# Patient Record
Sex: FEMALE | Race: WHITE | NOT HISPANIC OR LATINO | Employment: OTHER | URBAN - METROPOLITAN AREA
[De-identification: names, ages, dates, MRNs, and addresses within clinical notes are randomized per-mention and may not be internally consistent; named-entity substitution may affect disease eponyms.]

---

## 2017-05-31 ENCOUNTER — NURSE TRIAGE (OUTPATIENT)
Dept: ADMINISTRATIVE | Facility: CLINIC | Age: 72
End: 2017-05-31

## 2017-05-31 NOTE — TELEPHONE ENCOUNTER
Reason for Disposition   Patient wants to be seen    Protocols used: ST DIZZINESS-A-OH  Patient states she has been lightheaded and nauseous for the past few days.

## 2017-06-01 ENCOUNTER — OFFICE VISIT (OUTPATIENT)
Dept: INTERNAL MEDICINE | Facility: CLINIC | Age: 72
End: 2017-06-01
Payer: MEDICARE

## 2017-06-01 ENCOUNTER — LAB VISIT (OUTPATIENT)
Dept: LAB | Facility: HOSPITAL | Age: 72
End: 2017-06-01
Attending: INTERNAL MEDICINE
Payer: MEDICARE

## 2017-06-01 VITALS
DIASTOLIC BLOOD PRESSURE: 78 MMHG | BODY MASS INDEX: 32.91 KG/M2 | SYSTOLIC BLOOD PRESSURE: 158 MMHG | HEART RATE: 70 BPM | HEIGHT: 62 IN | WEIGHT: 178.81 LBS

## 2017-06-01 DIAGNOSIS — J30.9 ALLERGIC RHINITIS, UNSPECIFIED ALLERGIC RHINITIS TRIGGER, UNSPECIFIED RHINITIS SEASONALITY: ICD-10-CM

## 2017-06-01 DIAGNOSIS — Z12.31 OTHER SCREENING MAMMOGRAM: ICD-10-CM

## 2017-06-01 DIAGNOSIS — R42 VERTIGO: ICD-10-CM

## 2017-06-01 DIAGNOSIS — I10 ESSENTIAL HYPERTENSION: Primary | ICD-10-CM

## 2017-06-01 DIAGNOSIS — I10 ESSENTIAL HYPERTENSION: ICD-10-CM

## 2017-06-01 LAB
ALBUMIN SERPL BCP-MCNC: 3.9 G/DL
ALP SERPL-CCNC: 79 U/L
ALT SERPL W/O P-5'-P-CCNC: 17 U/L
ANION GAP SERPL CALC-SCNC: 9 MMOL/L
AST SERPL-CCNC: 15 U/L
BASOPHILS # BLD AUTO: 0.07 K/UL
BASOPHILS NFR BLD: 1.1 %
BILIRUB DIRECT SERPL-MCNC: 0.1 MG/DL
BILIRUB SERPL-MCNC: 0.3 MG/DL
BUN SERPL-MCNC: 16 MG/DL
CALCIUM SERPL-MCNC: 9.7 MG/DL
CHLORIDE SERPL-SCNC: 102 MMOL/L
CHOLEST/HDLC SERPL: 4.2 {RATIO}
CO2 SERPL-SCNC: 27 MMOL/L
CREAT SERPL-MCNC: 0.8 MG/DL
DIFFERENTIAL METHOD: ABNORMAL
EOSINOPHIL # BLD AUTO: 0.1 K/UL
EOSINOPHIL NFR BLD: 1.4 %
ERYTHROCYTE [DISTWIDTH] IN BLOOD BY AUTOMATED COUNT: 12.3 %
EST. GFR  (AFRICAN AMERICAN): >60 ML/MIN/1.73 M^2
EST. GFR  (NON AFRICAN AMERICAN): >60 ML/MIN/1.73 M^2
GLUCOSE SERPL-MCNC: 105 MG/DL
HCT VFR BLD AUTO: 42.6 %
HDL/CHOLESTEROL RATIO: 24.1 %
HDLC SERPL-MCNC: 191 MG/DL
HDLC SERPL-MCNC: 46 MG/DL
HGB BLD-MCNC: 14.4 G/DL
LDLC SERPL CALC-MCNC: 117 MG/DL
LYMPHOCYTES # BLD AUTO: 2.1 K/UL
LYMPHOCYTES NFR BLD: 32 %
MCH RBC QN AUTO: 31.3 PG
MCHC RBC AUTO-ENTMCNC: 33.8 %
MCV RBC AUTO: 93 FL
MONOCYTES # BLD AUTO: 0.5 K/UL
MONOCYTES NFR BLD: 7.4 %
NEUTROPHILS # BLD AUTO: 3.8 K/UL
NEUTROPHILS NFR BLD: 57.9 %
NONHDLC SERPL-MCNC: 145 MG/DL
PLATELET # BLD AUTO: 257 K/UL
PMV BLD AUTO: 10.4 FL
POTASSIUM SERPL-SCNC: 4.5 MMOL/L
PROT SERPL-MCNC: 7.3 G/DL
RBC # BLD AUTO: 4.6 M/UL
SODIUM SERPL-SCNC: 138 MMOL/L
TRIGL SERPL-MCNC: 140 MG/DL
TSH SERPL DL<=0.005 MIU/L-ACNC: 1.95 UIU/ML
WBC # BLD AUTO: 6.51 K/UL

## 2017-06-01 PROCEDURE — 36415 COLL VENOUS BLD VENIPUNCTURE: CPT

## 2017-06-01 PROCEDURE — 85025 COMPLETE CBC W/AUTO DIFF WBC: CPT

## 2017-06-01 PROCEDURE — 80048 BASIC METABOLIC PNL TOTAL CA: CPT

## 2017-06-01 PROCEDURE — 80076 HEPATIC FUNCTION PANEL: CPT

## 2017-06-01 PROCEDURE — 99214 OFFICE O/P EST MOD 30 MIN: CPT | Mod: S$PBB,,, | Performed by: INTERNAL MEDICINE

## 2017-06-01 PROCEDURE — 80061 LIPID PANEL: CPT

## 2017-06-01 PROCEDURE — 84443 ASSAY THYROID STIM HORMONE: CPT

## 2017-06-01 PROCEDURE — 99999 PR PBB SHADOW E&M-EST. PATIENT-LVL III: CPT | Mod: PBBFAC,,, | Performed by: INTERNAL MEDICINE

## 2017-06-01 RX ORDER — AMLODIPINE BESYLATE 5 MG/1
5 TABLET ORAL DAILY
Qty: 30 TABLET | Refills: 6 | Status: SHIPPED | OUTPATIENT
Start: 2017-06-01 | End: 2017-11-27 | Stop reason: SDUPTHER

## 2017-06-01 NOTE — PROGRESS NOTES
"Subjective:       Patient ID: Delphine Perdomo is a 71 y.o. female.    Chief Complaint: Dizziness and Blood Pressure Check   this is a 71-year-old who presents today for concerns about recent dizziness patient reports earlier in the week she was swimming and she got out of the pool turned her head and then felt dizzy she felt vertigo sensation like the room spinning she did not feel like she was going to pass out.  She did not have headaches or any other associated neurologic symptoms.  She has been cautious with her movements and symptoms seem to be improving over time /she has had some chronic balance issues since her prior neck surgery.  Patient denies chest pain or shortness of breath.  She has had some sneezing and sinus issues and nasal congestion.  Recently but no fevers chills.  She denies cough or shortness of breath  Patient has not been in for an appointment in about 3 years but reports in general have been well she exercises and retired in the last year.  She and her  have been traveling and other than one fall and a visit to an outlying ER for the fall she was told her blood pressure was high at that time and has not been checking it since then on a regular basis.    HPI  Review of Systems   Constitutional: Positive for fatigue.   HENT: Positive for congestion.         Sneezing a lot    Respiratory: Negative for chest tightness and shortness of breath.    Cardiovascular: Negative for chest pain, palpitations and leg swelling.   Gastrointestinal:        No blood in her stool  Belches at times    Neurological: Positive for dizziness. Negative for syncope, speech difficulty, numbness and headaches.        Dizziness vertigo with positional change  Early this week improving        Objective:     Blood pressure (!) 158/78, pulse 70, height 5' 2" (1.575 m), weight 81.1 kg (178 lb 12.7 oz).    Physical Exam   Constitutional: No distress.   HENT:   Head: Normocephalic.   Mouth/Throat: Oropharynx is clear " and moist.   Rhinitis    Eyes: No scleral icterus.   Neck: Neck supple.   Cardiovascular: Normal rate, regular rhythm and normal heart sounds.  Exam reveals no gallop and no friction rub.    No murmur heard.  Pulmonary/Chest: Effort normal and breath sounds normal. No respiratory distress.   Abdominal: Soft. Bowel sounds are normal. She exhibits no mass. There is no tenderness.   Musculoskeletal: She exhibits no edema.   Neurological: She is alert. She displays normal reflexes. No cranial nerve deficit. Coordination normal.   Skin: No erythema.   Vitals reviewed.      Assessment:       1. Essential hypertension    2. Vertigo    3. Other screening mammogram    4. Allergic rhinitis, unspecified allergic rhinitis trigger, unspecified rhinitis seasonality        Plan:       Delphine Simms was seen today for dizziness.    Diagnoses and all orders for this visit:    Essential hypertension  Blood pressure elevated patient has not been seen in several years but has been to an outlying ER after a fall and had an elevated blood pressure at that time she will resume home blood pressure monitoring she is exercising regularly and watches salt in her diet discussed with elevated levels would go ahead and consider starting medication risk benefits reviewed  -     Basic metabolic panel; Future  -     CBC auto differential; Future  -     Hepatic function panel; Future  -     Lipid panel; Future  -     TSH; Future  -     EKG 12-lead; Future    Vertigo  Recent episode of with eustachian dysfunction ALLERGIC rhinitis we discussed antihistamine/Flonase versus saline nasal spray she will also check with her ophthalmologist due to history of glaucoma  Fall precautions and slow movement to prevent recurrence discussed    Other screening mammogram  -     Mammo Digital Screening Bilat with CAD; Future    Allergic rhinitis, unspecified allergic rhinitis trigger, unspecified rhinitis seasonality    rx to start risk benefits reviwed  -      amlodipine (NORVASC) 5 MG tablet; Take 1 tablet (5 mg total) by mouth once daily.    She will seek attention if increased dizziness concerns chest pain or shortness of breath we'll update her blood work EKG and a mammogram order was placed encouraged her to arrange at her convenience she will return for recheck in a [facility] 6-8 weeks call with elevations or concerns

## 2017-06-28 DIAGNOSIS — Z12.11 COLON CANCER SCREENING: ICD-10-CM

## 2017-07-18 ENCOUNTER — HOSPITAL ENCOUNTER (OUTPATIENT)
Dept: RADIOLOGY | Facility: HOSPITAL | Age: 72
Discharge: HOME | End: 2017-07-18
Attending: INTERNAL MEDICINE
Payer: MEDICARE

## 2017-07-18 ENCOUNTER — TELEPHONE (OUTPATIENT)
Dept: INTERNAL MEDICINE | Facility: CLINIC | Age: 72
End: 2017-07-18

## 2017-07-18 ENCOUNTER — OFFICE VISIT (OUTPATIENT)
Dept: INTERNAL MEDICINE | Facility: CLINIC | Age: 72
End: 2017-07-18
Payer: MEDICARE

## 2017-07-18 ENCOUNTER — DOCUMENTATION ONLY (OUTPATIENT)
Dept: INTERNAL MEDICINE | Facility: CLINIC | Age: 72
End: 2017-07-18

## 2017-07-18 ENCOUNTER — HOSPITAL ENCOUNTER (OUTPATIENT)
Dept: CARDIOLOGY | Facility: CLINIC | Age: 72
Discharge: HOME | End: 2017-07-18
Payer: MEDICARE

## 2017-07-18 VITALS
HEART RATE: 68 BPM | DIASTOLIC BLOOD PRESSURE: 78 MMHG | HEIGHT: 62 IN | BODY MASS INDEX: 33.1 KG/M2 | SYSTOLIC BLOOD PRESSURE: 118 MMHG | WEIGHT: 179.88 LBS

## 2017-07-18 VITALS — BODY MASS INDEX: 32.76 KG/M2 | HEIGHT: 62 IN | WEIGHT: 178 LBS

## 2017-07-18 DIAGNOSIS — I10 ESSENTIAL HYPERTENSION: Primary | ICD-10-CM

## 2017-07-18 DIAGNOSIS — R05.9 COUGH: ICD-10-CM

## 2017-07-18 DIAGNOSIS — Z23 NEED FOR VACCINATION WITH 13-POLYVALENT PNEUMOCOCCAL CONJUGATE VACCINE: ICD-10-CM

## 2017-07-18 DIAGNOSIS — I10 ESSENTIAL HYPERTENSION: ICD-10-CM

## 2017-07-18 DIAGNOSIS — Z12.31 OTHER SCREENING MAMMOGRAM: ICD-10-CM

## 2017-07-18 PROCEDURE — 1126F AMNT PAIN NOTED NONE PRSNT: CPT | Mod: ,,, | Performed by: INTERNAL MEDICINE

## 2017-07-18 PROCEDURE — 77067 SCR MAMMO BI INCL CAD: CPT | Mod: 26,,, | Performed by: RADIOLOGY

## 2017-07-18 PROCEDURE — 90670 PCV13 VACCINE IM: CPT | Mod: PBBFAC | Performed by: INTERNAL MEDICINE

## 2017-07-18 PROCEDURE — 71020 XR CHEST PA AND LATERAL: CPT | Mod: 26,,, | Performed by: RADIOLOGY

## 2017-07-18 PROCEDURE — 1159F MED LIST DOCD IN RCRD: CPT | Mod: ,,, | Performed by: INTERNAL MEDICINE

## 2017-07-18 PROCEDURE — 99213 OFFICE O/P EST LOW 20 MIN: CPT | Mod: PBBFAC,25 | Performed by: INTERNAL MEDICINE

## 2017-07-18 PROCEDURE — G0009 ADMIN PNEUMOCOCCAL VACCINE: HCPCS | Mod: PBBFAC

## 2017-07-18 PROCEDURE — 99999 PR PBB SHADOW E&M-EST. PATIENT-LVL III: CPT | Mod: PBBFAC,,, | Performed by: INTERNAL MEDICINE

## 2017-07-18 PROCEDURE — 93010 ELECTROCARDIOGRAM REPORT: CPT | Mod: S$PBB,,, | Performed by: INTERNAL MEDICINE

## 2017-07-18 PROCEDURE — 99214 OFFICE O/P EST MOD 30 MIN: CPT | Mod: S$PBB,,, | Performed by: INTERNAL MEDICINE

## 2017-07-18 NOTE — TELEPHONE ENCOUNTER
----- Message from Brenda Figueroa MD sent at 7/18/2017 12:10 PM CDT -----  Call and notify pt her chest xray was normal  Her ekg was normal /stable

## 2017-07-18 NOTE — PROGRESS NOTES
"Subjective:       Patient ID:  a 71 y.o. female.    Chief Complaint: Follow-up    This is a 71 year old who presents for since last visit she has been doing well she is here for hypertension and checkup.  Patient reports that she is tolerating amlodipine without difficulty her home blood pressure has been doing quite well she's had no further episodes of vertigo and has been having some problems with postnasal drip and ALLERGIES.  She does have a cough on occasion and has been notes intermittently she will get a bronchial infection that tends to linger.  She denies chest pain or palpitations is feeling quite well today and is tried to be a bit more active she and her  would like to consider going to the gym to get more active and they would like referral to medical fitness program.     HPI  Review of Systems   Constitutional: Negative for fever.   HENT:        Allergies post nasal drip   occasioal cough intermittant    Respiratory: Negative for cough, shortness of breath and wheezing.    Cardiovascular: Negative for chest pain and palpitations.   Gastrointestinal: Negative for abdominal pain and constipation.   Neurological: Negative for dizziness.       Objective:     Blood pressure 118/78, pulse 68, height 5' 2" (1.575 m), weight 81.6 kg (179 lb 14.3 oz).    Physical Exam   Constitutional: No distress.   HENT:   Head: Normocephalic.   Mouth/Throat: Oropharynx is clear and moist.   Eyes: No scleral icterus.   Neck: Neck supple.   Cardiovascular: Normal rate, regular rhythm and normal heart sounds.  Exam reveals no gallop and no friction rub.    No murmur heard.  Pulmonary/Chest: Effort normal and breath sounds normal. No respiratory distress.   Breast : normal no masses or tenderness    Abdominal: Soft. Bowel sounds are normal. She exhibits no mass. There is no tenderness.   Musculoskeletal: She exhibits no edema.   Neurological: She is alert. No cranial nerve deficit. Coordination normal.   Skin: No " erythema.   seb derm    Psychiatric: She has a normal mood and affect.   Vitals reviewed.      Assessment:       1. Essential hypertension    2. Cough    3. Need for vaccination with 13-polyvalent pneumococcal conjugate vaccine        Plan:       Delphine Simms was seen today for follow-up.    Diagnoses and all orders for this visit:    Essential hypertension  Blood pressure acceptable continue   Current regmine home bp monitoring call with concerns     -     X-Ray Chest PA And Lateral; Future  -     Ambulatory Referral to [facility] ([facility])  -     [facility] [place] [facility]IGN [facility]STIONNA[facility]IES ([facility])  -     [facility] Patient Entered [email or web address]  Referral to [facility]     Cough  Post nasal drip allergic rhinitis discussed  Consider allergy appt if persists   -     X-Ray Chest PA And Lateral; Future    Need for vaccination with 13-polyvalent pneumococcal conjugate vaccine  -     (In Office Administered) Pneumococcal Conjugate Vaccine (13 Valent) (IM)    She declines bone density colonoscopy at this time   Fit kit recommended , she had her annual mammogram     Recent labs and ekg reviewed with pt     6 month follow up

## 2017-09-29 ENCOUNTER — IMMUNIZATION (OUTPATIENT)
Dept: INTERNAL MEDICINE | Facility: CLINIC | Age: 72
End: 2017-09-29
Payer: MEDICARE

## 2017-09-29 PROCEDURE — G0008 ADMIN INFLUENZA VIRUS VAC: HCPCS | Mod: PBBFAC

## 2017-11-27 RX ORDER — AMLODIPINE BESYLATE 5 MG/1
5 TABLET ORAL DAILY
Qty: 90 TABLET | Refills: 0 | Status: SHIPPED | OUTPATIENT
Start: 2017-11-27 | End: 2018-03-16 | Stop reason: SDUPTHER

## 2018-03-16 RX ORDER — AMLODIPINE BESYLATE 5 MG/1
5 TABLET ORAL DAILY
Qty: 90 TABLET | Refills: 1 | Status: SHIPPED | OUTPATIENT
Start: 2018-03-16 | End: 2018-09-09 | Stop reason: SDUPTHER

## 2018-06-27 DIAGNOSIS — I10 HYPERTENSION, UNSPECIFIED TYPE: ICD-10-CM

## 2018-06-27 DIAGNOSIS — E74.39 GLUCOSE INTOLERANCE: ICD-10-CM

## 2018-06-27 DIAGNOSIS — Z00.00 ANNUAL PHYSICAL EXAM: Primary | ICD-10-CM

## 2018-07-03 DIAGNOSIS — Z12.11 COLON CANCER SCREENING: ICD-10-CM

## 2018-07-03 DIAGNOSIS — Z11.59 NEED FOR HEPATITIS C SCREENING TEST: ICD-10-CM

## 2018-09-07 ENCOUNTER — LAB VISIT (OUTPATIENT)
Dept: LAB | Facility: HOSPITAL | Age: 73
End: 2018-09-07
Attending: INTERNAL MEDICINE
Payer: MEDICARE

## 2018-09-07 ENCOUNTER — OFFICE VISIT (OUTPATIENT)
Dept: INTERNAL MEDICINE | Facility: CLINIC | Age: 73
End: 2018-09-07
Payer: MEDICARE

## 2018-09-07 VITALS
BODY MASS INDEX: 32.57 KG/M2 | WEIGHT: 177 LBS | HEIGHT: 62 IN | DIASTOLIC BLOOD PRESSURE: 78 MMHG | SYSTOLIC BLOOD PRESSURE: 122 MMHG | HEART RATE: 83 BPM

## 2018-09-07 DIAGNOSIS — Z12.31 ENCOUNTER FOR SCREENING MAMMOGRAM FOR BREAST CANCER: ICD-10-CM

## 2018-09-07 DIAGNOSIS — I10 HYPERTENSION, UNSPECIFIED TYPE: ICD-10-CM

## 2018-09-07 DIAGNOSIS — Z00.00 ANNUAL PHYSICAL EXAM: ICD-10-CM

## 2018-09-07 DIAGNOSIS — Z00.00 ANNUAL PHYSICAL EXAM: Primary | ICD-10-CM

## 2018-09-07 DIAGNOSIS — I10 ESSENTIAL HYPERTENSION: ICD-10-CM

## 2018-09-07 LAB
ALBUMIN SERPL BCP-MCNC: 4.1 G/DL
ALP SERPL-CCNC: 66 U/L
ALT SERPL W/O P-5'-P-CCNC: 21 U/L
ANION GAP SERPL CALC-SCNC: 8 MMOL/L
AST SERPL-CCNC: 15 U/L
BASOPHILS # BLD AUTO: 0.05 K/UL
BASOPHILS NFR BLD: 1 %
BILIRUB DIRECT SERPL-MCNC: 0.1 MG/DL
BILIRUB SERPL-MCNC: 0.4 MG/DL
BUN SERPL-MCNC: 26 MG/DL
CALCIUM SERPL-MCNC: 9.7 MG/DL
CHLORIDE SERPL-SCNC: 106 MMOL/L
CHOLEST SERPL-MCNC: 176 MG/DL
CHOLEST/HDLC SERPL: 3.1 {RATIO}
CO2 SERPL-SCNC: 27 MMOL/L
CREAT SERPL-MCNC: 0.8 MG/DL
DIFFERENTIAL METHOD: NORMAL
EOSINOPHIL # BLD AUTO: 0.2 K/UL
EOSINOPHIL NFR BLD: 3.3 %
ERYTHROCYTE [DISTWIDTH] IN BLOOD BY AUTOMATED COUNT: 12.8 %
EST. GFR  (AFRICAN AMERICAN): >60 ML/MIN/1.73 M^2
EST. GFR  (NON AFRICAN AMERICAN): >60 ML/MIN/1.73 M^2
GLUCOSE SERPL-MCNC: 96 MG/DL
HCT VFR BLD AUTO: 41.7 %
HDLC SERPL-MCNC: 57 MG/DL
HDLC SERPL: 32.4 %
HGB BLD-MCNC: 13.6 G/DL
LDLC SERPL CALC-MCNC: 100.4 MG/DL
LYMPHOCYTES # BLD AUTO: 2 K/UL
LYMPHOCYTES NFR BLD: 37.6 %
MCH RBC QN AUTO: 30.9 PG
MCHC RBC AUTO-ENTMCNC: 32.6 G/DL
MCV RBC AUTO: 95 FL
MONOCYTES # BLD AUTO: 0.4 K/UL
MONOCYTES NFR BLD: 7.3 %
NEUTROPHILS # BLD AUTO: 2.6 K/UL
NEUTROPHILS NFR BLD: 50.6 %
NONHDLC SERPL-MCNC: 119 MG/DL
PLATELET # BLD AUTO: 240 K/UL
PMV BLD AUTO: 10.7 FL
POTASSIUM SERPL-SCNC: 4.9 MMOL/L
PROT SERPL-MCNC: 6.9 G/DL
RBC # BLD AUTO: 4.4 M/UL
SODIUM SERPL-SCNC: 141 MMOL/L
TRIGL SERPL-MCNC: 93 MG/DL
TSH SERPL DL<=0.005 MIU/L-ACNC: 2.6 UIU/ML
WBC # BLD AUTO: 5.19 K/UL

## 2018-09-07 PROCEDURE — 80048 BASIC METABOLIC PNL TOTAL CA: CPT

## 2018-09-07 PROCEDURE — 80076 HEPATIC FUNCTION PANEL: CPT

## 2018-09-07 PROCEDURE — 36415 COLL VENOUS BLD VENIPUNCTURE: CPT

## 2018-09-07 PROCEDURE — 99999 PR PBB SHADOW E&M-EST. PATIENT-LVL III: CPT | Mod: PBBFAC,,, | Performed by: INTERNAL MEDICINE

## 2018-09-07 PROCEDURE — 85025 COMPLETE CBC W/AUTO DIFF WBC: CPT

## 2018-09-07 PROCEDURE — 99397 PER PM REEVAL EST PAT 65+ YR: CPT | Mod: S$PBB,,, | Performed by: INTERNAL MEDICINE

## 2018-09-07 PROCEDURE — 80061 LIPID PANEL: CPT

## 2018-09-07 PROCEDURE — 99213 OFFICE O/P EST LOW 20 MIN: CPT | Mod: PBBFAC | Performed by: INTERNAL MEDICINE

## 2018-09-07 PROCEDURE — 84443 ASSAY THYROID STIM HORMONE: CPT

## 2018-09-07 NOTE — PROGRESS NOTES
"Subjective:       Patient ID: Delphine Perdomo is a 72 y.o. female.    Chief Complaint: Annual Exam   this is a 72-year-old who presents today for physical.  She has been doing well she and her  have been going to the [employer] since last year have been exercising more regularly she is working on the elliptical on the treadmill in feeling good with that her blood pressure has been good she is taking amlodipine daily without difficulty.  She has had no episodes of dizziness no headaches.  She does have trouble on occasion with discomfort in her sciatic region when it comes it can be quite bad has not happened in a while but no radiating pain or weakness.  She had previous neck surgery and denies neck pain or trouble since that time    HPI  Review of Systems   Constitutional: Negative for fever.   Respiratory: Negative for cough, shortness of breath and wheezing.    Cardiovascular: Negative for chest pain and palpitations.   Gastrointestinal: Negative for abdominal pain and constipation.   Neurological: Negative for dizziness.       Objective:     Blood pressure 122/78, pulse 83, height 5' 2" (1.575 m), weight 80.3 kg (177 lb 0.5 oz).    Physical Exam   Constitutional: No distress.   HENT:   Head: Normocephalic.   Mouth/Throat: Oropharynx is clear and moist.   Surgical scar    Eyes: No scleral icterus.   Neck: Neck supple.   Cardiovascular: Normal rate, regular rhythm and normal heart sounds. Exam reveals no gallop and no friction rub.   No murmur heard.  Pulmonary/Chest: Effort normal and breath sounds normal. No respiratory distress.   Breast : normal no masses or tenderness    Abdominal: Soft. Bowel sounds are normal. She exhibits no mass. There is no tenderness.   Musculoskeletal: She exhibits no edema.   Neurological: She is alert.   Skin: No erythema.   Psychiatric: She has a normal mood and affect.   Vitals reviewed.      Assessment:       1. Annual physical exam    2. Essential " hypertension    3. Encounter for screening mammogram for breast cancer        Plan:       Delphine Simms was seen today for annual exam.    Diagnoses and all orders for this visit:    Annual physical exam    Essential hypertension   blood pressure controlled continue current medications    Encounter for screening mammogram for breast cancer   she was encouraged to schedule  -     Mammo Digital Screening Bilat with CAD; Future     labs reviewed with patient continue current exercise dietary measures     she has declined colonoscopy but has fit test she plans to return for screening     we discussed digital hypertension program she may consider she will follow-up annually sooner if concern     discussed flu shot when available and shingles vaccine recommended

## 2018-09-09 RX ORDER — AMLODIPINE BESYLATE 5 MG/1
5 TABLET ORAL DAILY
Qty: 90 TABLET | Refills: 4 | Status: SHIPPED | OUTPATIENT
Start: 2018-09-09 | End: 2019-11-24 | Stop reason: SDUPTHER

## 2018-10-19 ENCOUNTER — IMMUNIZATION (OUTPATIENT)
Dept: INTERNAL MEDICINE | Facility: CLINIC | Age: 73
End: 2018-10-19
Payer: MEDICARE

## 2018-10-19 PROCEDURE — 90662 IIV NO PRSV INCREASED AG IM: CPT | Mod: PBBFAC

## 2018-12-31 ENCOUNTER — EXTERNAL CHRONIC CARE MANAGEMENT (OUTPATIENT)
Dept: PRIMARY CARE CLINIC | Facility: CLINIC | Age: 73
End: 2018-12-31
Payer: MEDICARE

## 2018-12-31 PROCEDURE — 99490 CHRNC CARE MGMT STAFF 1ST 20: CPT | Mod: S$PBB,,, | Performed by: INTERNAL MEDICINE

## 2018-12-31 PROCEDURE — 99490 CHRNC CARE MGMT STAFF 1ST 20: CPT | Mod: PBBFAC | Performed by: INTERNAL MEDICINE

## 2018-12-31 PROCEDURE — 99490 PR CHRONIC CARE MGMT, 1ST 20 MIN: ICD-10-PCS | Mod: S$PBB,,, | Performed by: INTERNAL MEDICINE

## 2019-01-31 ENCOUNTER — EXTERNAL CHRONIC CARE MANAGEMENT (OUTPATIENT)
Dept: PRIMARY CARE CLINIC | Facility: CLINIC | Age: 74
End: 2019-01-31
Payer: MEDICARE

## 2019-01-31 PROCEDURE — 99490 CHRNC CARE MGMT STAFF 1ST 20: CPT | Mod: PBBFAC | Performed by: INTERNAL MEDICINE

## 2019-01-31 PROCEDURE — 99490 CHRNC CARE MGMT STAFF 1ST 20: CPT | Mod: S$PBB,,, | Performed by: INTERNAL MEDICINE

## 2019-01-31 PROCEDURE — 99490 PR CHRONIC CARE MGMT, 1ST 20 MIN: ICD-10-PCS | Mod: S$PBB,,, | Performed by: INTERNAL MEDICINE

## 2019-03-31 ENCOUNTER — EXTERNAL CHRONIC CARE MANAGEMENT (OUTPATIENT)
Dept: PRIMARY CARE CLINIC | Facility: CLINIC | Age: 74
End: 2019-03-31
Payer: MEDICARE

## 2019-03-31 PROCEDURE — 99490 CHRNC CARE MGMT STAFF 1ST 20: CPT | Mod: S$PBB,,, | Performed by: INTERNAL MEDICINE

## 2019-03-31 PROCEDURE — 99490 PR CHRONIC CARE MGMT, 1ST 20 MIN: ICD-10-PCS | Mod: S$PBB,,, | Performed by: INTERNAL MEDICINE

## 2019-03-31 PROCEDURE — 99490 CHRNC CARE MGMT STAFF 1ST 20: CPT | Mod: PBBFAC | Performed by: INTERNAL MEDICINE

## 2019-04-30 ENCOUNTER — EXTERNAL CHRONIC CARE MANAGEMENT (OUTPATIENT)
Dept: PRIMARY CARE CLINIC | Facility: CLINIC | Age: 74
End: 2019-04-30
Payer: MEDICARE

## 2019-04-30 PROCEDURE — 99490 CHRNC CARE MGMT STAFF 1ST 20: CPT | Mod: PBBFAC | Performed by: INTERNAL MEDICINE

## 2019-04-30 PROCEDURE — 99490 PR CHRONIC CARE MGMT, 1ST 20 MIN: ICD-10-PCS | Mod: S$PBB,,, | Performed by: INTERNAL MEDICINE

## 2019-04-30 PROCEDURE — 99490 CHRNC CARE MGMT STAFF 1ST 20: CPT | Mod: S$PBB,,, | Performed by: INTERNAL MEDICINE

## 2019-05-31 ENCOUNTER — EXTERNAL CHRONIC CARE MANAGEMENT (OUTPATIENT)
Dept: PRIMARY CARE CLINIC | Facility: CLINIC | Age: 74
End: 2019-05-31
Payer: MEDICARE

## 2019-05-31 PROCEDURE — 99490 CHRNC CARE MGMT STAFF 1ST 20: CPT | Mod: S$PBB,,, | Performed by: INTERNAL MEDICINE

## 2019-05-31 PROCEDURE — 99490 PR CHRONIC CARE MGMT, 1ST 20 MIN: ICD-10-PCS | Mod: S$PBB,,, | Performed by: INTERNAL MEDICINE

## 2019-05-31 PROCEDURE — 99490 CHRNC CARE MGMT STAFF 1ST 20: CPT | Mod: PBBFAC | Performed by: INTERNAL MEDICINE

## 2019-07-11 DIAGNOSIS — Z12.11 COLON CANCER SCREENING: ICD-10-CM

## 2019-09-30 ENCOUNTER — EXTERNAL CHRONIC CARE MANAGEMENT (OUTPATIENT)
Dept: PRIMARY CARE CLINIC | Facility: CLINIC | Age: 74
End: 2019-09-30
Payer: MEDICARE

## 2019-09-30 PROCEDURE — 99490 PR CHRONIC CARE MGMT, 1ST 20 MIN: ICD-10-PCS | Mod: S$PBB,,, | Performed by: INTERNAL MEDICINE

## 2019-09-30 PROCEDURE — 99490 CHRNC CARE MGMT STAFF 1ST 20: CPT | Mod: S$PBB,,, | Performed by: INTERNAL MEDICINE

## 2019-09-30 PROCEDURE — 99490 CHRNC CARE MGMT STAFF 1ST 20: CPT | Mod: PBBFAC | Performed by: INTERNAL MEDICINE

## 2019-10-31 ENCOUNTER — EXTERNAL CHRONIC CARE MANAGEMENT (OUTPATIENT)
Dept: PRIMARY CARE CLINIC | Facility: CLINIC | Age: 74
End: 2019-10-31
Payer: MEDICARE

## 2019-10-31 PROCEDURE — 99490 CHRNC CARE MGMT STAFF 1ST 20: CPT | Mod: S$PBB,,, | Performed by: INTERNAL MEDICINE

## 2019-10-31 PROCEDURE — 99490 CHRNC CARE MGMT STAFF 1ST 20: CPT | Mod: PBBFAC | Performed by: INTERNAL MEDICINE

## 2019-10-31 PROCEDURE — 99490 PR CHRONIC CARE MGMT, 1ST 20 MIN: ICD-10-PCS | Mod: S$PBB,,, | Performed by: INTERNAL MEDICINE

## 2019-11-24 RX ORDER — AMLODIPINE BESYLATE 5 MG/1
TABLET ORAL
Qty: 90 TABLET | Refills: 4 | Status: SHIPPED | OUTPATIENT
Start: 2019-11-24 | End: 2021-02-05

## 2019-11-30 ENCOUNTER — EXTERNAL CHRONIC CARE MANAGEMENT (OUTPATIENT)
Dept: PRIMARY CARE CLINIC | Facility: CLINIC | Age: 74
End: 2019-11-30
Payer: MEDICARE

## 2019-11-30 PROCEDURE — 99490 CHRNC CARE MGMT STAFF 1ST 20: CPT | Mod: S$PBB,,, | Performed by: INTERNAL MEDICINE

## 2019-11-30 PROCEDURE — 99490 PR CHRONIC CARE MGMT, 1ST 20 MIN: ICD-10-PCS | Mod: S$PBB,,, | Performed by: INTERNAL MEDICINE

## 2019-11-30 PROCEDURE — 99490 CHRNC CARE MGMT STAFF 1ST 20: CPT | Mod: PBBFAC | Performed by: INTERNAL MEDICINE

## 2019-12-31 ENCOUNTER — EXTERNAL CHRONIC CARE MANAGEMENT (OUTPATIENT)
Dept: PRIMARY CARE CLINIC | Facility: CLINIC | Age: 74
End: 2019-12-31
Payer: MEDICARE

## 2019-12-31 PROCEDURE — 99490 CHRNC CARE MGMT STAFF 1ST 20: CPT | Mod: S$PBB,,, | Performed by: INTERNAL MEDICINE

## 2019-12-31 PROCEDURE — 99490 CHRNC CARE MGMT STAFF 1ST 20: CPT | Mod: PBBFAC | Performed by: INTERNAL MEDICINE

## 2019-12-31 PROCEDURE — 99490 PR CHRONIC CARE MGMT, 1ST 20 MIN: ICD-10-PCS | Mod: S$PBB,,, | Performed by: INTERNAL MEDICINE

## 2020-01-02 ENCOUNTER — TELEPHONE (OUTPATIENT)
Dept: INTERNAL MEDICINE | Facility: CLINIC | Age: 75
End: 2020-01-02

## 2020-01-02 DIAGNOSIS — I10 HYPERTENSION, UNSPECIFIED TYPE: ICD-10-CM

## 2020-01-02 DIAGNOSIS — Z00.00 ANNUAL PHYSICAL EXAM: Primary | ICD-10-CM

## 2020-01-02 NOTE — TELEPHONE ENCOUNTER
----- Message from Rochelle Gutierrez sent at 1/2/2020 11:21 AM CST -----  Contact: Pt [phone]  Type: Orders Request    What orders/ testing are being requested? EPP    Is there a future appointment scheduled for the patient with PCP? Yes    When? 2/14/20

## 2020-01-02 NOTE — TELEPHONE ENCOUNTER
Pt is requesting you have her lab orders put in for her and her  to have them done prior to your visit if warranted. Pt states it is hard for her get to appointments because her  is working but may be able to come that morning of her appointment around 7.     Do you want the labs done prior or can they do them at the visit?

## 2020-01-02 NOTE — TELEPHONE ENCOUNTER
----- Message from Rochelle Gutierrez sent at 1/2/2020 11:21 AM CST -----  Contact: Pt [phone]  Type: Orders Request    What orders/ testing are being requested? EPP    Is there a future appointment scheduled for the patient with PCP? Yes    When? 2/14/20

## 2020-01-02 NOTE — TELEPHONE ENCOUNTER
Okay to do prior or same day  Whatever works for them   I may not have all labs back if they do same day but it is still okay if they prefer

## 2020-01-31 ENCOUNTER — EXTERNAL CHRONIC CARE MANAGEMENT (OUTPATIENT)
Dept: PRIMARY CARE CLINIC | Facility: CLINIC | Age: 75
End: 2020-01-31
Payer: MEDICARE

## 2020-01-31 PROCEDURE — 99490 PR CHRONIC CARE MGMT, 1ST 20 MIN: ICD-10-PCS | Mod: S$PBB,,, | Performed by: INTERNAL MEDICINE

## 2020-01-31 PROCEDURE — 99490 CHRNC CARE MGMT STAFF 1ST 20: CPT | Mod: PBBFAC | Performed by: INTERNAL MEDICINE

## 2020-01-31 PROCEDURE — 99490 CHRNC CARE MGMT STAFF 1ST 20: CPT | Mod: S$PBB,,, | Performed by: INTERNAL MEDICINE

## 2020-02-14 ENCOUNTER — LAB VISIT (OUTPATIENT)
Dept: LAB | Facility: HOSPITAL | Age: 75
End: 2020-02-14
Attending: INTERNAL MEDICINE
Payer: MEDICARE

## 2020-02-14 ENCOUNTER — RESEARCH ENCOUNTER (OUTPATIENT)
Dept: RESEARCH | Facility: HOSPITAL | Age: 75
End: 2020-02-14

## 2020-02-14 ENCOUNTER — OFFICE VISIT (OUTPATIENT)
Dept: INTERNAL MEDICINE | Facility: CLINIC | Age: 75
End: 2020-02-14
Payer: MEDICARE

## 2020-02-14 VITALS
OXYGEN SATURATION: 97 % | WEIGHT: 172.63 LBS | DIASTOLIC BLOOD PRESSURE: 74 MMHG | HEIGHT: 62 IN | SYSTOLIC BLOOD PRESSURE: 132 MMHG | BODY MASS INDEX: 31.77 KG/M2 | HEART RATE: 78 BPM

## 2020-02-14 DIAGNOSIS — I10 HYPERTENSION, UNSPECIFIED TYPE: ICD-10-CM

## 2020-02-14 DIAGNOSIS — I10 ESSENTIAL HYPERTENSION: Primary | ICD-10-CM

## 2020-02-14 DIAGNOSIS — Z78.0 POSTMENOPAUSAL: ICD-10-CM

## 2020-02-14 DIAGNOSIS — Z12.31 ENCOUNTER FOR SCREENING MAMMOGRAM FOR BREAST CANCER: ICD-10-CM

## 2020-02-14 DIAGNOSIS — Z00.00 ANNUAL PHYSICAL EXAM: ICD-10-CM

## 2020-02-14 DIAGNOSIS — Z12.11 COLON CANCER SCREENING: ICD-10-CM

## 2020-02-14 LAB
ALBUMIN SERPL BCP-MCNC: 3.9 G/DL (ref 3.5–5.2)
ALP SERPL-CCNC: 69 U/L (ref 55–135)
ALT SERPL W/O P-5'-P-CCNC: 18 U/L (ref 10–44)
ANION GAP SERPL CALC-SCNC: 7 MMOL/L (ref 8–16)
AST SERPL-CCNC: 14 U/L (ref 10–40)
BASOPHILS # BLD AUTO: 0.06 K/UL (ref 0–0.2)
BASOPHILS NFR BLD: 1.1 % (ref 0–1.9)
BILIRUB DIRECT SERPL-MCNC: 0.2 MG/DL (ref 0.1–0.3)
BILIRUB SERPL-MCNC: 0.3 MG/DL (ref 0.1–1)
BUN SERPL-MCNC: 19 MG/DL (ref 8–23)
CALCIUM SERPL-MCNC: 9.8 MG/DL (ref 8.7–10.5)
CHLORIDE SERPL-SCNC: 105 MMOL/L (ref 95–110)
CHOLEST SERPL-MCNC: 171 MG/DL (ref 120–199)
CHOLEST/HDLC SERPL: 2.9 {RATIO} (ref 2–5)
CO2 SERPL-SCNC: 29 MMOL/L (ref 23–29)
CREAT SERPL-MCNC: 0.8 MG/DL (ref 0.5–1.4)
DIFFERENTIAL METHOD: ABNORMAL
EOSINOPHIL # BLD AUTO: 0.1 K/UL (ref 0–0.5)
EOSINOPHIL NFR BLD: 1.8 % (ref 0–8)
ERYTHROCYTE [DISTWIDTH] IN BLOOD BY AUTOMATED COUNT: 11.9 % (ref 11.5–14.5)
EST. GFR  (AFRICAN AMERICAN): >60 ML/MIN/1.73 M^2
EST. GFR  (NON AFRICAN AMERICAN): >60 ML/MIN/1.73 M^2
GLUCOSE SERPL-MCNC: 107 MG/DL (ref 70–110)
HCT VFR BLD AUTO: 42.8 % (ref 37–48.5)
HDLC SERPL-MCNC: 60 MG/DL (ref 40–75)
HDLC SERPL: 35.1 % (ref 20–50)
HGB BLD-MCNC: 13.5 G/DL (ref 12–16)
IMM GRANULOCYTES # BLD AUTO: 0.01 K/UL (ref 0–0.04)
IMM GRANULOCYTES NFR BLD AUTO: 0.2 % (ref 0–0.5)
LDLC SERPL CALC-MCNC: 91.8 MG/DL (ref 63–159)
LYMPHOCYTES # BLD AUTO: 1.9 K/UL (ref 1–4.8)
LYMPHOCYTES NFR BLD: 33.4 % (ref 18–48)
MCH RBC QN AUTO: 31.2 PG (ref 27–31)
MCHC RBC AUTO-ENTMCNC: 31.5 G/DL (ref 32–36)
MCV RBC AUTO: 99 FL (ref 82–98)
MONOCYTES # BLD AUTO: 0.4 K/UL (ref 0.3–1)
MONOCYTES NFR BLD: 7.1 % (ref 4–15)
NEUTROPHILS # BLD AUTO: 3.2 K/UL (ref 1.8–7.7)
NEUTROPHILS NFR BLD: 56.4 % (ref 38–73)
NONHDLC SERPL-MCNC: 111 MG/DL
NRBC BLD-RTO: 0 /100 WBC
PLATELET # BLD AUTO: 263 K/UL (ref 150–350)
PMV BLD AUTO: 10.5 FL (ref 9.2–12.9)
POTASSIUM SERPL-SCNC: 4.5 MMOL/L (ref 3.5–5.1)
PROT SERPL-MCNC: 7 G/DL (ref 6–8.4)
RBC # BLD AUTO: 4.33 M/UL (ref 4–5.4)
SODIUM SERPL-SCNC: 141 MMOL/L (ref 136–145)
TRIGL SERPL-MCNC: 96 MG/DL (ref 30–150)
TSH SERPL DL<=0.005 MIU/L-ACNC: 1.9 UIU/ML (ref 0.4–4)
WBC # BLD AUTO: 5.63 K/UL (ref 3.9–12.7)

## 2020-02-14 PROCEDURE — 80061 LIPID PANEL: CPT

## 2020-02-14 PROCEDURE — 99214 PR OFFICE/OUTPT VISIT, EST, LEVL IV, 30-39 MIN: ICD-10-PCS | Mod: S$PBB,,, | Performed by: INTERNAL MEDICINE

## 2020-02-14 PROCEDURE — 80076 HEPATIC FUNCTION PANEL: CPT

## 2020-02-14 PROCEDURE — 85025 COMPLETE CBC W/AUTO DIFF WBC: CPT

## 2020-02-14 PROCEDURE — 99213 OFFICE O/P EST LOW 20 MIN: CPT | Mod: PBBFAC | Performed by: INTERNAL MEDICINE

## 2020-02-14 PROCEDURE — 36415 COLL VENOUS BLD VENIPUNCTURE: CPT

## 2020-02-14 PROCEDURE — 84443 ASSAY THYROID STIM HORMONE: CPT

## 2020-02-14 PROCEDURE — 99999 PR PBB SHADOW E&M-EST. PATIENT-LVL III: CPT | Mod: PBBFAC,,, | Performed by: INTERNAL MEDICINE

## 2020-02-14 PROCEDURE — 99214 OFFICE O/P EST MOD 30 MIN: CPT | Mod: S$PBB,,, | Performed by: INTERNAL MEDICINE

## 2020-02-14 PROCEDURE — 80048 BASIC METABOLIC PNL TOTAL CA: CPT

## 2020-02-14 PROCEDURE — 99999 PR PBB SHADOW E&M-EST. PATIENT-LVL III: ICD-10-PCS | Mod: PBBFAC,,, | Performed by: INTERNAL MEDICINE

## 2020-02-14 NOTE — PROGRESS NOTES
"Subjective:       Patient ID: Delphine Perdomo is a 74 y.o. female.    Chief Complaint: Annual Exam   This is a 74-year-old who presents today for physical she is brought in by her  reports that she has been doing fairly well she is tolerating her amlodipine without difficulty and her blood pressure is good she had her  both been exercising regularly the gym without difficulty.  She reports that she has been volunteering at the library which she enjoys other than that she stays active she denies any headaches .  She continues to have some difficulty since her neck surgery with some mild  Balance issues no progression or weakness.  She has had no recent falls.  She denies chest pain .     HPI  Review of Systems   Constitutional: Negative for fever.   Respiratory: Negative for cough, shortness of breath and wheezing.    Cardiovascular: Negative for chest pain and palpitations.   Gastrointestinal: Negative for abdominal pain and constipation.   Neurological: Negative for dizziness.       Objective:     Blood pressure 132/74, pulse 78, height 5' 2" (1.575 m), weight 78.3 kg (172 lb 9.9 oz), SpO2 97 %.    Physical Exam   Constitutional: No distress.   HENT:   Head: Normocephalic.   Mouth/Throat: Oropharynx is clear and moist.   Surgical scar    Eyes: No scleral icterus.   Neck: Neck supple.   Cardiovascular: Normal rate, regular rhythm and normal heart sounds. Exam reveals no gallop and no friction rub.   No murmur heard.  Pulmonary/Chest: Effort normal and breath sounds normal. No respiratory distress.   Breast : normal no masses or tenderness    Abdominal: Soft. Bowel sounds are normal. She exhibits no mass. There is no tenderness.   Musculoskeletal: She exhibits no edema.   Neurological: She is alert.   Skin: No erythema.   Psychiatric: She has a normal mood and affect.   Vitals reviewed.      Assessment:       1. Essential hypertension    2. Encounter for screening mammogram for breast cancer    3. " Colon cancer screening    4. Postmenopausal        Plan:       Delphine Simms was seen today for annual exam.    Diagnoses and all orders for this visit:      Essential hypertension  Blood pressure acceptable continue amlodipine tolerating without difficulty  Discussed digital htn program if she would like to consider     Encounter for screening mammogram for breast cancer  -     Mammo Digital Screening Bilat w/ Yousuf; Future    Colon cancer screening  She declines colonoscopy fit kit provided  -     Fecal Immunochemical Test (iFOBT); Future    Postmenopausal  Consider bone density update order placed for scheduling  -     DXA Bone Density Spine And Hip; Future    Previous neck surgery we discussed specialty follow-up if concerns or increased symptoms she will call if she would like to do so    Discussed gyn follow up she declines     Follow-up annually sooner if concerns

## 2020-02-14 NOTE — PROGRESS NOTES
RESEARCH STUDY CONSENT ENCOUNTER   INFECTIOUS DISEASE RESEARCH  [facility] DARIANA HERNADEZ    Subject was seen in clinic today on [id number] and consented for the following study. Subject is being consented as a healthy control and they DO NOT have Alzheimer's Disease or any neurocognitive impairment that would exclude them from participation. Participation in the study will conclude after all specimens have been collected.      Study title: Gut Microbiome Diversity in Alzheimer's Disease  IRB #: [id number]  IRB approval date: 10/08/2019  Sponsor: Dr. Rafael Shabazz    Subject #: [id number]     Present for discussion: pt  was present   Subject has capacity to consent per evaluation: yes    Prior to the Informed Consent (IC) being signed, or any protocol required testing, procedure, or intervention being performed, the following was done or discussed:    · Purpose of the Study, Qualifications to Participate: yes  · Study Design, Schedule and Procedures: yes  · Risks, Benefits, Alternative Treatments, Compensation and Costs: yes  · Confidentiality and HIPAA Authorization for Release of Medical Records for the research trial/subject's right/study related injury: yes  · Study related contact information: yes  · Voluntary Participation and Withdrawal from the research trial at any time: yes  · Patient has been offered the opportunity to ask questions regarding the study and all questions were answered satisfactorily: yes  · Patient and/or LAR verbalizes understanding of the study/procedures and agrees to participate: yes  · CRC and PI contact information given to LAR and/or patient: yes  · Signed copy given to patient and/or LAR: yes  · Copy in patient's chart and original uploaded to [facility]: yes    Research staff obtaining consent: Pati Pickering    Research staff present during consent: Calvin Ruth    In accordance with the study protocol, Research Lab orders were placed and blood specimens were collected in:  [facility] LAB IM: yes  [facility]  LAB VNP: no    Initial Visit:  Eligibility Criteria reviewed: yes  Demographics and Health History reviewed: yes  MOCA administered with subject: yes  MARS questionnaire completed: yes  Stool collection kit instructions reviewed with subject: yes  Blood specimen collected and transferred to [facility]: yes    [facility]  Infectious Disease Research

## 2020-08-17 ENCOUNTER — PATIENT OUTREACH (OUTPATIENT)
Dept: ADMINISTRATIVE | Facility: HOSPITAL | Age: 75
End: 2020-08-17

## 2020-08-17 ENCOUNTER — DOCUMENTATION ONLY (OUTPATIENT)
Dept: ADMINISTRATIVE | Facility: HOSPITAL | Age: 75
End: 2020-08-17

## 2020-10-05 ENCOUNTER — PATIENT MESSAGE (OUTPATIENT)
Dept: ADMINISTRATIVE | Facility: HOSPITAL | Age: 75
End: 2020-10-05

## 2020-10-06 ENCOUNTER — IMMUNIZATION (OUTPATIENT)
Dept: INTERNAL MEDICINE | Facility: CLINIC | Age: 75
End: 2020-10-06
Payer: MEDICARE

## 2020-10-06 PROCEDURE — G0008 ADMIN INFLUENZA VIRUS VAC: HCPCS | Mod: PBBFAC

## 2020-10-06 PROCEDURE — 90694 VACC AIIV4 NO PRSRV 0.5ML IM: CPT | Mod: PBBFAC

## 2021-01-03 ENCOUNTER — NURSE TRIAGE (OUTPATIENT)
Dept: ADMINISTRATIVE | Facility: CLINIC | Age: 76
End: 2021-01-03

## 2021-01-03 ENCOUNTER — PATIENT MESSAGE (OUTPATIENT)
Dept: ADMINISTRATIVE | Facility: OTHER | Age: 76
End: 2021-01-03

## 2021-01-04 ENCOUNTER — PATIENT MESSAGE (OUTPATIENT)
Dept: INTERNAL MEDICINE | Facility: CLINIC | Age: 76
End: 2021-01-04

## 2021-01-04 ENCOUNTER — PATIENT MESSAGE (OUTPATIENT)
Dept: ADMINISTRATIVE | Facility: HOSPITAL | Age: 76
End: 2021-01-04

## 2021-01-09 ENCOUNTER — IMMUNIZATION (OUTPATIENT)
Dept: INTERNAL MEDICINE | Facility: CLINIC | Age: 76
End: 2021-01-09
Payer: COMMERCIAL

## 2021-01-09 DIAGNOSIS — Z23 NEED FOR VACCINATION: ICD-10-CM

## 2021-01-09 PROCEDURE — 0001A COVID-19, MRNA, LNP-S, PF, 30 MCG/0.3 ML DOSE VACCINE: CPT | Mod: CV19,,, | Performed by: INTERNAL MEDICINE

## 2021-01-09 PROCEDURE — 91300 COVID-19, MRNA, LNP-S, PF, 30 MCG/0.3 ML DOSE VACCINE: ICD-10-PCS | Mod: ,,, | Performed by: INTERNAL MEDICINE

## 2021-01-09 PROCEDURE — 0001A COVID-19, MRNA, LNP-S, PF, 30 MCG/0.3 ML DOSE VACCINE: ICD-10-PCS | Mod: CV19,,, | Performed by: INTERNAL MEDICINE

## 2021-01-09 PROCEDURE — 91300 COVID-19, MRNA, LNP-S, PF, 30 MCG/0.3 ML DOSE VACCINE: CPT | Mod: ,,, | Performed by: INTERNAL MEDICINE

## 2021-01-30 ENCOUNTER — IMMUNIZATION (OUTPATIENT)
Dept: INTERNAL MEDICINE | Facility: CLINIC | Age: 76
End: 2021-01-30
Payer: MEDICARE

## 2021-01-30 DIAGNOSIS — Z23 NEED FOR VACCINATION: Primary | ICD-10-CM

## 2021-01-30 PROCEDURE — 0002A PR IMMUNIZ ADMIN, SARS-COV-2 COVID-19 VACC, 30MCG/0.3ML, 2ND DOSE: CPT | Mod: PBBFAC

## 2021-01-30 PROCEDURE — 91300 PR SARS-COV- 2 COVID-19 VACCINE, NO PRSV, 30MCG/0.3ML, IM: CPT | Mod: PBBFAC

## 2021-01-30 RX ADMIN — RNA INGREDIENT BNT-162B2 0.3 ML: 0.23 INJECTION, SUSPENSION INTRAMUSCULAR at 10:01

## 2021-03-18 ENCOUNTER — PATIENT MESSAGE (OUTPATIENT)
Dept: RESEARCH | Facility: HOSPITAL | Age: 76
End: 2021-03-18

## 2021-03-26 ENCOUNTER — PATIENT MESSAGE (OUTPATIENT)
Dept: RESEARCH | Facility: HOSPITAL | Age: 76
End: 2021-03-26

## 2021-04-05 ENCOUNTER — PATIENT MESSAGE (OUTPATIENT)
Dept: ADMINISTRATIVE | Facility: HOSPITAL | Age: 76
End: 2021-04-05

## 2021-04-19 ENCOUNTER — PES CALL (OUTPATIENT)
Dept: ADMINISTRATIVE | Facility: CLINIC | Age: 76
End: 2021-04-19

## 2021-05-11 ENCOUNTER — PES CALL (OUTPATIENT)
Dept: ADMINISTRATIVE | Facility: CLINIC | Age: 76
End: 2021-05-11

## 2021-05-11 DIAGNOSIS — Z12.11 COLON CANCER SCREENING: ICD-10-CM

## 2021-06-07 ENCOUNTER — PATIENT MESSAGE (OUTPATIENT)
Dept: ADMINISTRATIVE | Facility: HOSPITAL | Age: 76
End: 2021-06-07

## 2021-06-07 ENCOUNTER — DOCUMENTATION ONLY (OUTPATIENT)
Dept: ADMINISTRATIVE | Facility: HOSPITAL | Age: 76
End: 2021-06-07

## 2021-06-07 ENCOUNTER — PATIENT OUTREACH (OUTPATIENT)
Dept: ADMINISTRATIVE | Facility: HOSPITAL | Age: 76
End: 2021-06-07

## 2021-06-07 DIAGNOSIS — M94.9 DISORDER OF BONE AND ARTICULAR CARTILAGE: ICD-10-CM

## 2021-06-07 DIAGNOSIS — Z12.31 ENCOUNTER FOR SCREENING MAMMOGRAM FOR MALIGNANT NEOPLASM OF BREAST: ICD-10-CM

## 2021-06-07 DIAGNOSIS — M89.9 DISORDER OF BONE AND ARTICULAR CARTILAGE: ICD-10-CM

## 2021-06-07 DIAGNOSIS — Z12.39 ENCOUNTER FOR SCREENING FOR MALIGNANT NEOPLASM OF BREAST, UNSPECIFIED SCREENING MODALITY: Primary | ICD-10-CM

## 2021-06-07 DIAGNOSIS — Z78.0 ASYMPTOMATIC MENOPAUSAL STATE: ICD-10-CM

## 2021-07-07 ENCOUNTER — PATIENT MESSAGE (OUTPATIENT)
Dept: ADMINISTRATIVE | Facility: HOSPITAL | Age: 76
End: 2021-07-07

## 2021-07-21 ENCOUNTER — OFFICE VISIT (OUTPATIENT)
Dept: INTERNAL MEDICINE | Facility: CLINIC | Age: 76
End: 2021-07-21
Payer: MEDICARE

## 2021-07-21 VITALS
OXYGEN SATURATION: 96 % | DIASTOLIC BLOOD PRESSURE: 86 MMHG | HEIGHT: 62 IN | WEIGHT: 175.06 LBS | BODY MASS INDEX: 32.22 KG/M2 | SYSTOLIC BLOOD PRESSURE: 128 MMHG | HEART RATE: 79 BPM

## 2021-07-21 DIAGNOSIS — R25.1 TREMOR: ICD-10-CM

## 2021-07-21 DIAGNOSIS — Z00.00 ANNUAL PHYSICAL EXAM: ICD-10-CM

## 2021-07-21 DIAGNOSIS — I10 ESSENTIAL HYPERTENSION: Primary | ICD-10-CM

## 2021-07-21 DIAGNOSIS — M48.02 SPINAL STENOSIS IN CERVICAL REGION: ICD-10-CM

## 2021-07-21 DIAGNOSIS — S89.92XS INJURY OF LEFT LOWER EXTREMITY, SEQUELA: ICD-10-CM

## 2021-07-21 PROCEDURE — 99213 OFFICE O/P EST LOW 20 MIN: CPT | Mod: PBBFAC | Performed by: INTERNAL MEDICINE

## 2021-07-21 PROCEDURE — 99214 OFFICE O/P EST MOD 30 MIN: CPT | Mod: S$GLB,,, | Performed by: INTERNAL MEDICINE

## 2021-07-21 PROCEDURE — 1159F PR MEDICATION LIST DOCUMENTED IN MEDICAL RECORD: ICD-10-PCS | Mod: CPTII,S$GLB,, | Performed by: INTERNAL MEDICINE

## 2021-07-21 PROCEDURE — 99214 PR OFFICE/OUTPT VISIT, EST, LEVL IV, 30-39 MIN: ICD-10-PCS | Mod: S$GLB,,, | Performed by: INTERNAL MEDICINE

## 2021-07-21 PROCEDURE — 1159F MED LIST DOCD IN RCRD: CPT | Mod: CPTII,S$GLB,, | Performed by: INTERNAL MEDICINE

## 2021-07-21 PROCEDURE — 99999 PR PBB SHADOW E&M-EST. PATIENT-LVL III: ICD-10-PCS | Mod: PBBFAC,,, | Performed by: INTERNAL MEDICINE

## 2021-07-21 PROCEDURE — 99999 PR PBB SHADOW E&M-EST. PATIENT-LVL III: CPT | Mod: PBBFAC,,, | Performed by: INTERNAL MEDICINE

## 2021-07-22 ENCOUNTER — TELEPHONE (OUTPATIENT)
Dept: NEUROLOGY | Facility: CLINIC | Age: 76
End: 2021-07-22

## 2021-07-23 ENCOUNTER — TELEPHONE (OUTPATIENT)
Dept: NEUROLOGY | Facility: CLINIC | Age: 76
End: 2021-07-23

## 2021-08-27 ENCOUNTER — TELEPHONE (OUTPATIENT)
Dept: NEUROLOGY | Facility: CLINIC | Age: 76
End: 2021-08-27

## 2021-09-24 ENCOUNTER — LAB VISIT (OUTPATIENT)
Dept: LAB | Facility: HOSPITAL | Age: 76
End: 2021-09-24
Attending: INTERNAL MEDICINE
Payer: MEDICARE

## 2021-09-24 DIAGNOSIS — I10 ESSENTIAL HYPERTENSION: ICD-10-CM

## 2021-09-24 DIAGNOSIS — Z00.00 ANNUAL PHYSICAL EXAM: ICD-10-CM

## 2021-09-24 LAB
ALBUMIN SERPL BCP-MCNC: 3.8 G/DL (ref 3.5–5.2)
ALP SERPL-CCNC: 71 U/L (ref 55–135)
ALT SERPL W/O P-5'-P-CCNC: 16 U/L (ref 10–44)
ANION GAP SERPL CALC-SCNC: 14 MMOL/L (ref 8–16)
AST SERPL-CCNC: 16 U/L (ref 10–40)
BASOPHILS # BLD AUTO: 0.07 K/UL (ref 0–0.2)
BASOPHILS NFR BLD: 1.3 % (ref 0–1.9)
BILIRUB DIRECT SERPL-MCNC: 0.2 MG/DL (ref 0.1–0.3)
BILIRUB SERPL-MCNC: 0.4 MG/DL (ref 0.1–1)
BUN SERPL-MCNC: 15 MG/DL (ref 8–23)
CALCIUM SERPL-MCNC: 9.9 MG/DL (ref 8.7–10.5)
CHLORIDE SERPL-SCNC: 103 MMOL/L (ref 95–110)
CHOLEST SERPL-MCNC: 153 MG/DL (ref 120–199)
CHOLEST/HDLC SERPL: 2.8 {RATIO} (ref 2–5)
CO2 SERPL-SCNC: 20 MMOL/L (ref 23–29)
CREAT SERPL-MCNC: 0.7 MG/DL (ref 0.5–1.4)
DIFFERENTIAL METHOD: ABNORMAL
EOSINOPHIL # BLD AUTO: 0.2 K/UL (ref 0–0.5)
EOSINOPHIL NFR BLD: 3.1 % (ref 0–8)
ERYTHROCYTE [DISTWIDTH] IN BLOOD BY AUTOMATED COUNT: 12.3 % (ref 11.5–14.5)
EST. GFR  (AFRICAN AMERICAN): >60 ML/MIN/1.73 M^2
EST. GFR  (NON AFRICAN AMERICAN): >60 ML/MIN/1.73 M^2
GLUCOSE SERPL-MCNC: 100 MG/DL (ref 70–110)
HCT VFR BLD AUTO: 40.6 % (ref 37–48.5)
HDLC SERPL-MCNC: 55 MG/DL (ref 40–75)
HDLC SERPL: 35.9 % (ref 20–50)
HGB BLD-MCNC: 13.6 G/DL (ref 12–16)
IMM GRANULOCYTES # BLD AUTO: 0.01 K/UL (ref 0–0.04)
IMM GRANULOCYTES NFR BLD AUTO: 0.2 % (ref 0–0.5)
LDLC SERPL CALC-MCNC: 88.2 MG/DL (ref 63–159)
LYMPHOCYTES # BLD AUTO: 2.2 K/UL (ref 1–4.8)
LYMPHOCYTES NFR BLD: 39.7 % (ref 18–48)
MCH RBC QN AUTO: 31.9 PG (ref 27–31)
MCHC RBC AUTO-ENTMCNC: 33.5 G/DL (ref 32–36)
MCV RBC AUTO: 95 FL (ref 82–98)
MONOCYTES # BLD AUTO: 0.5 K/UL (ref 0.3–1)
MONOCYTES NFR BLD: 8.1 % (ref 4–15)
NEUTROPHILS # BLD AUTO: 2.6 K/UL (ref 1.8–7.7)
NEUTROPHILS NFR BLD: 47.6 % (ref 38–73)
NONHDLC SERPL-MCNC: 98 MG/DL
NRBC BLD-RTO: 0 /100 WBC
PLATELET # BLD AUTO: 260 K/UL (ref 150–450)
PMV BLD AUTO: 10.5 FL (ref 9.2–12.9)
POTASSIUM SERPL-SCNC: 4.7 MMOL/L (ref 3.5–5.1)
PROT SERPL-MCNC: 6.8 G/DL (ref 6–8.4)
RBC # BLD AUTO: 4.26 M/UL (ref 4–5.4)
SODIUM SERPL-SCNC: 137 MMOL/L (ref 136–145)
TRIGL SERPL-MCNC: 49 MG/DL (ref 30–150)
TSH SERPL DL<=0.005 MIU/L-ACNC: 3.09 UIU/ML (ref 0.4–4)
WBC # BLD AUTO: 5.54 K/UL (ref 3.9–12.7)

## 2021-09-24 PROCEDURE — 80048 BASIC METABOLIC PNL TOTAL CA: CPT | Mod: HCNC | Performed by: INTERNAL MEDICINE

## 2021-09-24 PROCEDURE — 84443 ASSAY THYROID STIM HORMONE: CPT | Mod: HCNC | Performed by: INTERNAL MEDICINE

## 2021-09-24 PROCEDURE — 85025 COMPLETE CBC W/AUTO DIFF WBC: CPT | Mod: HCNC | Performed by: INTERNAL MEDICINE

## 2021-09-24 PROCEDURE — 80076 HEPATIC FUNCTION PANEL: CPT | Mod: HCNC | Performed by: INTERNAL MEDICINE

## 2021-09-24 PROCEDURE — 80061 LIPID PANEL: CPT | Mod: HCNC | Performed by: INTERNAL MEDICINE

## 2021-09-24 PROCEDURE — 36415 COLL VENOUS BLD VENIPUNCTURE: CPT | Mod: HCNC | Performed by: INTERNAL MEDICINE

## 2021-09-27 ENCOUNTER — OFFICE VISIT (OUTPATIENT)
Dept: NEUROLOGY | Facility: CLINIC | Age: 76
End: 2021-09-27
Payer: MEDICARE

## 2021-09-27 VITALS
BODY MASS INDEX: 31.37 KG/M2 | WEIGHT: 171.5 LBS | HEART RATE: 83 BPM | DIASTOLIC BLOOD PRESSURE: 76 MMHG | SYSTOLIC BLOOD PRESSURE: 131 MMHG

## 2021-09-27 DIAGNOSIS — M48.02 SPINAL STENOSIS IN CERVICAL REGION: ICD-10-CM

## 2021-09-27 DIAGNOSIS — Z71.89 COUNSELING REGARDING GOALS OF CARE: ICD-10-CM

## 2021-09-27 DIAGNOSIS — G20.A1 PD (PARKINSON'S DISEASE): Primary | ICD-10-CM

## 2021-09-27 PROCEDURE — 1159F MED LIST DOCD IN RCRD: CPT | Mod: HCNC,CPTII,S$GLB, | Performed by: PHYSICIAN ASSISTANT

## 2021-09-27 PROCEDURE — 3078F DIAST BP <80 MM HG: CPT | Mod: HCNC,CPTII,S$GLB, | Performed by: PHYSICIAN ASSISTANT

## 2021-09-27 PROCEDURE — 1100F PTFALLS ASSESS-DOCD GE2>/YR: CPT | Mod: HCNC,CPTII,S$GLB, | Performed by: PHYSICIAN ASSISTANT

## 2021-09-27 PROCEDURE — 1100F PR PT FALLS ASSESS DOC 2+ FALLS/FALL W/INJURY/YR: ICD-10-PCS | Mod: HCNC,CPTII,S$GLB, | Performed by: PHYSICIAN ASSISTANT

## 2021-09-27 PROCEDURE — 99999 PR PBB SHADOW E&M-EST. PATIENT-LVL III: CPT | Mod: PBBFAC,HCNC,, | Performed by: PHYSICIAN ASSISTANT

## 2021-09-27 PROCEDURE — 3075F SYST BP GE 130 - 139MM HG: CPT | Mod: HCNC,CPTII,S$GLB, | Performed by: PHYSICIAN ASSISTANT

## 2021-09-27 PROCEDURE — 1159F PR MEDICATION LIST DOCUMENTED IN MEDICAL RECORD: ICD-10-PCS | Mod: HCNC,CPTII,S$GLB, | Performed by: PHYSICIAN ASSISTANT

## 2021-09-27 PROCEDURE — 99999 PR PBB SHADOW E&M-EST. PATIENT-LVL III: ICD-10-PCS | Mod: PBBFAC,HCNC,, | Performed by: PHYSICIAN ASSISTANT

## 2021-09-27 PROCEDURE — 3288F PR FALLS RISK ASSESSMENT DOCUMENTED: ICD-10-PCS | Mod: HCNC,CPTII,S$GLB, | Performed by: PHYSICIAN ASSISTANT

## 2021-09-27 PROCEDURE — 99205 PR OFFICE/OUTPT VISIT, NEW, LEVL V, 60-74 MIN: ICD-10-PCS | Mod: HCNC,S$GLB,, | Performed by: PHYSICIAN ASSISTANT

## 2021-09-27 PROCEDURE — 1126F AMNT PAIN NOTED NONE PRSNT: CPT | Mod: HCNC,CPTII,S$GLB, | Performed by: PHYSICIAN ASSISTANT

## 2021-09-27 PROCEDURE — 3288F FALL RISK ASSESSMENT DOCD: CPT | Mod: HCNC,CPTII,S$GLB, | Performed by: PHYSICIAN ASSISTANT

## 2021-09-27 PROCEDURE — 1126F PR PAIN SEVERITY QUANTIFIED, NO PAIN PRESENT: ICD-10-PCS | Mod: HCNC,CPTII,S$GLB, | Performed by: PHYSICIAN ASSISTANT

## 2021-09-27 PROCEDURE — 1160F RVW MEDS BY RX/DR IN RCRD: CPT | Mod: HCNC,CPTII,S$GLB, | Performed by: PHYSICIAN ASSISTANT

## 2021-09-27 PROCEDURE — 3078F PR MOST RECENT DIASTOLIC BLOOD PRESSURE < 80 MM HG: ICD-10-PCS | Mod: HCNC,CPTII,S$GLB, | Performed by: PHYSICIAN ASSISTANT

## 2021-09-27 PROCEDURE — 3075F PR MOST RECENT SYSTOLIC BLOOD PRESS GE 130-139MM HG: ICD-10-PCS | Mod: HCNC,CPTII,S$GLB, | Performed by: PHYSICIAN ASSISTANT

## 2021-09-27 PROCEDURE — 99205 OFFICE O/P NEW HI 60 MIN: CPT | Mod: HCNC,S$GLB,, | Performed by: PHYSICIAN ASSISTANT

## 2021-09-27 PROCEDURE — 1160F PR REVIEW ALL MEDS BY PRESCRIBER/CLIN PHARMACIST DOCUMENTED: ICD-10-PCS | Mod: HCNC,CPTII,S$GLB, | Performed by: PHYSICIAN ASSISTANT

## 2021-09-27 RX ORDER — CARBIDOPA AND LEVODOPA 25; 100 MG/1; MG/1
1 TABLET ORAL 3 TIMES DAILY
Qty: 90 TABLET | Refills: 11 | Status: SHIPPED | OUTPATIENT
Start: 2021-09-27 | End: 2022-09-05

## 2021-09-29 ENCOUNTER — OFFICE VISIT (OUTPATIENT)
Dept: INTERNAL MEDICINE | Facility: CLINIC | Age: 76
End: 2021-09-29
Payer: MEDICARE

## 2021-09-29 VITALS
DIASTOLIC BLOOD PRESSURE: 72 MMHG | HEIGHT: 62 IN | OXYGEN SATURATION: 98 % | BODY MASS INDEX: 32.25 KG/M2 | WEIGHT: 175.25 LBS | SYSTOLIC BLOOD PRESSURE: 128 MMHG | HEART RATE: 72 BPM

## 2021-09-29 DIAGNOSIS — G20.A1 PD (PARKINSON'S DISEASE): ICD-10-CM

## 2021-09-29 DIAGNOSIS — R25.1 TREMOR: ICD-10-CM

## 2021-09-29 DIAGNOSIS — I10 HYPERTENSION, UNSPECIFIED TYPE: ICD-10-CM

## 2021-09-29 DIAGNOSIS — M48.02 SPINAL STENOSIS IN CERVICAL REGION: ICD-10-CM

## 2021-09-29 DIAGNOSIS — Z00.00 ANNUAL PHYSICAL EXAM: Primary | ICD-10-CM

## 2021-09-29 DIAGNOSIS — Z12.11 COLON CANCER SCREENING: ICD-10-CM

## 2021-09-29 PROCEDURE — 99999 PR PBB SHADOW E&M-EST. PATIENT-LVL III: CPT | Mod: PBBFAC,HCNC,, | Performed by: INTERNAL MEDICINE

## 2021-09-29 PROCEDURE — 1160F RVW MEDS BY RX/DR IN RCRD: CPT | Mod: HCNC,CPTII,S$GLB, | Performed by: INTERNAL MEDICINE

## 2021-09-29 PROCEDURE — 3078F DIAST BP <80 MM HG: CPT | Mod: HCNC,CPTII,S$GLB, | Performed by: INTERNAL MEDICINE

## 2021-09-29 PROCEDURE — 1159F PR MEDICATION LIST DOCUMENTED IN MEDICAL RECORD: ICD-10-PCS | Mod: HCNC,CPTII,S$GLB, | Performed by: INTERNAL MEDICINE

## 2021-09-29 PROCEDURE — 3078F PR MOST RECENT DIASTOLIC BLOOD PRESSURE < 80 MM HG: ICD-10-PCS | Mod: HCNC,CPTII,S$GLB, | Performed by: INTERNAL MEDICINE

## 2021-09-29 PROCEDURE — 1126F PR PAIN SEVERITY QUANTIFIED, NO PAIN PRESENT: ICD-10-PCS | Mod: HCNC,CPTII,S$GLB, | Performed by: INTERNAL MEDICINE

## 2021-09-29 PROCEDURE — 3288F PR FALLS RISK ASSESSMENT DOCUMENTED: ICD-10-PCS | Mod: HCNC,CPTII,S$GLB, | Performed by: INTERNAL MEDICINE

## 2021-09-29 PROCEDURE — 1126F AMNT PAIN NOTED NONE PRSNT: CPT | Mod: HCNC,CPTII,S$GLB, | Performed by: INTERNAL MEDICINE

## 2021-09-29 PROCEDURE — 99397 PER PM REEVAL EST PAT 65+ YR: CPT | Mod: HCNC,S$GLB,, | Performed by: INTERNAL MEDICINE

## 2021-09-29 PROCEDURE — 1101F PR PT FALLS ASSESS DOC 0-1 FALLS W/OUT INJ PAST YR: ICD-10-PCS | Mod: HCNC,CPTII,S$GLB, | Performed by: INTERNAL MEDICINE

## 2021-09-29 PROCEDURE — 1159F MED LIST DOCD IN RCRD: CPT | Mod: HCNC,CPTII,S$GLB, | Performed by: INTERNAL MEDICINE

## 2021-09-29 PROCEDURE — 1101F PT FALLS ASSESS-DOCD LE1/YR: CPT | Mod: HCNC,CPTII,S$GLB, | Performed by: INTERNAL MEDICINE

## 2021-09-29 PROCEDURE — 3074F SYST BP LT 130 MM HG: CPT | Mod: HCNC,CPTII,S$GLB, | Performed by: INTERNAL MEDICINE

## 2021-09-29 PROCEDURE — 1160F PR REVIEW ALL MEDS BY PRESCRIBER/CLIN PHARMACIST DOCUMENTED: ICD-10-PCS | Mod: HCNC,CPTII,S$GLB, | Performed by: INTERNAL MEDICINE

## 2021-09-29 PROCEDURE — 99397 PR PREVENTIVE VISIT,EST,65 & OVER: ICD-10-PCS | Mod: HCNC,S$GLB,, | Performed by: INTERNAL MEDICINE

## 2021-09-29 PROCEDURE — 3288F FALL RISK ASSESSMENT DOCD: CPT | Mod: HCNC,CPTII,S$GLB, | Performed by: INTERNAL MEDICINE

## 2021-09-29 PROCEDURE — 3074F PR MOST RECENT SYSTOLIC BLOOD PRESSURE < 130 MM HG: ICD-10-PCS | Mod: HCNC,CPTII,S$GLB, | Performed by: INTERNAL MEDICINE

## 2021-09-29 PROCEDURE — 99999 PR PBB SHADOW E&M-EST. PATIENT-LVL III: ICD-10-PCS | Mod: PBBFAC,HCNC,, | Performed by: INTERNAL MEDICINE

## 2021-09-30 ENCOUNTER — PATIENT MESSAGE (OUTPATIENT)
Dept: NEUROLOGY | Facility: CLINIC | Age: 76
End: 2021-09-30

## 2021-10-01 ENCOUNTER — IMMUNIZATION (OUTPATIENT)
Dept: INTERNAL MEDICINE | Facility: CLINIC | Age: 76
End: 2021-10-01
Payer: MEDICARE

## 2021-10-01 DIAGNOSIS — Z23 NEED FOR VACCINATION: Primary | ICD-10-CM

## 2021-10-01 PROCEDURE — 0003A COVID-19, MRNA, LNP-S, PF, 30 MCG/0.3 ML DOSE VACCINE: CPT | Mod: HCNC,CV19,PBBFAC | Performed by: INTERNAL MEDICINE

## 2021-10-01 PROCEDURE — 91300 COVID-19, MRNA, LNP-S, PF, 30 MCG/0.3 ML DOSE VACCINE: CPT | Mod: HCNC,PBBFAC | Performed by: INTERNAL MEDICINE

## 2021-10-04 ENCOUNTER — PATIENT MESSAGE (OUTPATIENT)
Dept: ADMINISTRATIVE | Facility: HOSPITAL | Age: 76
End: 2021-10-04

## 2021-10-08 ENCOUNTER — HOSPITAL ENCOUNTER (OUTPATIENT)
Dept: RADIOLOGY | Facility: CLINIC | Age: 76
Discharge: HOME | End: 2021-10-08
Attending: INTERNAL MEDICINE
Payer: MEDICARE

## 2021-10-08 DIAGNOSIS — Z78.0 ASYMPTOMATIC MENOPAUSAL STATE: ICD-10-CM

## 2021-10-08 DIAGNOSIS — M89.9 DISORDER OF BONE AND ARTICULAR CARTILAGE: ICD-10-CM

## 2021-10-08 DIAGNOSIS — M94.9 DISORDER OF BONE AND ARTICULAR CARTILAGE: ICD-10-CM

## 2021-10-08 PROCEDURE — 77080 DXA BONE DENSITY AXIAL: CPT | Mod: TC,HCNC

## 2021-10-08 PROCEDURE — 77080 DXA BONE DENSITY AXIAL: CPT | Mod: 26,HCNC,, | Performed by: INTERNAL MEDICINE

## 2021-10-08 PROCEDURE — 77080 DEXA BONE DENSITY SPINE HIP: ICD-10-PCS | Mod: 26,HCNC,, | Performed by: INTERNAL MEDICINE

## 2021-10-11 ENCOUNTER — HOSPITAL ENCOUNTER (OUTPATIENT)
Dept: RADIOLOGY | Facility: HOSPITAL | Age: 76
Discharge: HOME | End: 2021-10-11
Attending: INTERNAL MEDICINE
Payer: MEDICARE

## 2021-10-11 VITALS — BODY MASS INDEX: 31.28 KG/M2 | HEIGHT: 62 IN | WEIGHT: 170 LBS

## 2021-10-11 DIAGNOSIS — Z12.31 ENCOUNTER FOR SCREENING MAMMOGRAM FOR MALIGNANT NEOPLASM OF BREAST: ICD-10-CM

## 2021-10-11 DIAGNOSIS — Z12.39 ENCOUNTER FOR SCREENING FOR MALIGNANT NEOPLASM OF BREAST, UNSPECIFIED SCREENING MODALITY: ICD-10-CM

## 2021-10-11 PROCEDURE — 77063 BREAST TOMOSYNTHESIS BI: CPT | Mod: 26,HCNC,, | Performed by: RADIOLOGY

## 2021-10-11 PROCEDURE — 77067 SCR MAMMO BI INCL CAD: CPT | Mod: TC,HCNC

## 2021-10-11 PROCEDURE — 77063 MAMMO DIGITAL SCREENING BILAT WITH TOMO: ICD-10-PCS | Mod: 26,HCNC,, | Performed by: RADIOLOGY

## 2021-10-11 PROCEDURE — 77067 MAMMO DIGITAL SCREENING BILAT WITH TOMO: ICD-10-PCS | Mod: 26,HCNC,, | Performed by: RADIOLOGY

## 2021-10-11 PROCEDURE — 77067 SCR MAMMO BI INCL CAD: CPT | Mod: 26,HCNC,, | Performed by: RADIOLOGY

## 2021-11-24 ENCOUNTER — OFFICE VISIT (OUTPATIENT)
Dept: NEUROLOGY | Facility: CLINIC | Age: 76
End: 2021-11-24
Payer: MEDICARE

## 2021-11-24 VITALS
HEART RATE: 79 BPM | SYSTOLIC BLOOD PRESSURE: 126 MMHG | BODY MASS INDEX: 31.28 KG/M2 | DIASTOLIC BLOOD PRESSURE: 79 MMHG | HEIGHT: 62 IN | WEIGHT: 170 LBS

## 2021-11-24 DIAGNOSIS — M48.02 SPINAL STENOSIS IN CERVICAL REGION: ICD-10-CM

## 2021-11-24 DIAGNOSIS — Z71.89 COUNSELING REGARDING GOALS OF CARE: ICD-10-CM

## 2021-11-24 DIAGNOSIS — G20.A1 PD (PARKINSON'S DISEASE): Primary | ICD-10-CM

## 2021-11-24 PROCEDURE — 99999 PR PBB SHADOW E&M-EST. PATIENT-LVL III: ICD-10-PCS | Mod: PBBFAC,HCNC,, | Performed by: PHYSICIAN ASSISTANT

## 2021-11-24 PROCEDURE — 99214 OFFICE O/P EST MOD 30 MIN: CPT | Mod: HCNC,S$GLB,, | Performed by: PHYSICIAN ASSISTANT

## 2021-11-24 PROCEDURE — 99499 UNLISTED E&M SERVICE: CPT | Mod: HCNC,S$GLB,, | Performed by: PHYSICIAN ASSISTANT

## 2021-11-24 PROCEDURE — 99499 RISK ADDL DX/OHS AUDIT: ICD-10-PCS | Mod: HCNC,S$GLB,, | Performed by: PHYSICIAN ASSISTANT

## 2021-11-24 PROCEDURE — 99214 PR OFFICE/OUTPT VISIT, EST, LEVL IV, 30-39 MIN: ICD-10-PCS | Mod: HCNC,S$GLB,, | Performed by: PHYSICIAN ASSISTANT

## 2021-11-24 PROCEDURE — 99999 PR PBB SHADOW E&M-EST. PATIENT-LVL III: CPT | Mod: PBBFAC,HCNC,, | Performed by: PHYSICIAN ASSISTANT

## 2021-12-03 ENCOUNTER — PATIENT MESSAGE (OUTPATIENT)
Dept: NEUROLOGY | Facility: CLINIC | Age: 76
End: 2021-12-03
Payer: MEDICARE

## 2022-02-04 ENCOUNTER — TELEPHONE (OUTPATIENT)
Dept: NEUROLOGY | Facility: CLINIC | Age: 77
End: 2022-02-04
Payer: MEDICARE

## 2022-02-04 NOTE — TELEPHONE ENCOUNTER
----- Message from Keisha Santiago sent at 2/4/2022  3:15 PM CST -----  Regarding: speak with nurse  Contact: patient  [phone]    please call patient said she need to speak with the nurse concerning her appointment would like to come into the office waiting on a call back thanks.

## 2022-02-07 ENCOUNTER — PATIENT MESSAGE (OUTPATIENT)
Dept: NEUROLOGY | Facility: CLINIC | Age: 77
End: 2022-02-07
Payer: MEDICARE

## 2022-02-08 ENCOUNTER — PATIENT MESSAGE (OUTPATIENT)
Dept: INTERNAL MEDICINE | Facility: CLINIC | Age: 77
End: 2022-02-08
Payer: MEDICARE

## 2022-02-08 DIAGNOSIS — M25.521 RIGHT ELBOW PAIN: Primary | ICD-10-CM

## 2022-02-08 NOTE — TELEPHONE ENCOUNTER
Called and spoke with pt  Elbow discomfort after doing floor exercises  Has persisted some reduction in size but painful  Discussed avoid pressure to area  And will place ortho referrral    Please assist in [facility] orthopedic appointment  For her elbow order in

## 2022-02-09 ENCOUNTER — OFFICE VISIT (OUTPATIENT)
Dept: ORTHOPEDICS | Facility: CLINIC | Age: 77
End: 2022-02-09
Payer: MEDICARE

## 2022-02-09 ENCOUNTER — HOSPITAL ENCOUNTER (OUTPATIENT)
Dept: RADIOLOGY | Facility: HOSPITAL | Age: 77
Discharge: HOME | End: 2022-02-09
Attending: PHYSICIAN ASSISTANT
Payer: MEDICARE

## 2022-02-09 ENCOUNTER — PATIENT MESSAGE (OUTPATIENT)
Dept: NEUROLOGY | Facility: CLINIC | Age: 77
End: 2022-02-09
Payer: MEDICARE

## 2022-02-09 VITALS — WEIGHT: 152.69 LBS | BODY MASS INDEX: 28.1 KG/M2 | HEIGHT: 62 IN

## 2022-02-09 DIAGNOSIS — M70.21 OLECRANON BURSITIS OF RIGHT ELBOW: Primary | ICD-10-CM

## 2022-02-09 DIAGNOSIS — M25.521 RIGHT ELBOW PAIN: ICD-10-CM

## 2022-02-09 PROCEDURE — 1126F PR PAIN SEVERITY QUANTIFIED, NO PAIN PRESENT: ICD-10-PCS | Mod: HCNC,CPTII,S$GLB, | Performed by: PHYSICIAN ASSISTANT

## 2022-02-09 PROCEDURE — 3288F FALL RISK ASSESSMENT DOCD: CPT | Mod: HCNC,CPTII,S$GLB, | Performed by: PHYSICIAN ASSISTANT

## 2022-02-09 PROCEDURE — 1159F PR MEDICATION LIST DOCUMENTED IN MEDICAL RECORD: ICD-10-PCS | Mod: HCNC,CPTII,S$GLB, | Performed by: PHYSICIAN ASSISTANT

## 2022-02-09 PROCEDURE — 1159F MED LIST DOCD IN RCRD: CPT | Mod: HCNC,CPTII,S$GLB, | Performed by: PHYSICIAN ASSISTANT

## 2022-02-09 PROCEDURE — 3288F PR FALLS RISK ASSESSMENT DOCUMENTED: ICD-10-PCS | Mod: HCNC,CPTII,S$GLB, | Performed by: PHYSICIAN ASSISTANT

## 2022-02-09 PROCEDURE — 1101F PR PT FALLS ASSESS DOC 0-1 FALLS W/OUT INJ PAST YR: ICD-10-PCS | Mod: HCNC,CPTII,S$GLB, | Performed by: PHYSICIAN ASSISTANT

## 2022-02-09 PROCEDURE — 73080 X-RAY EXAM OF ELBOW: CPT | Mod: TC,HCNC,RT

## 2022-02-09 PROCEDURE — 1160F RVW MEDS BY RX/DR IN RCRD: CPT | Mod: HCNC,CPTII,S$GLB, | Performed by: PHYSICIAN ASSISTANT

## 2022-02-09 PROCEDURE — 1160F PR REVIEW ALL MEDS BY PRESCRIBER/CLIN PHARMACIST DOCUMENTED: ICD-10-PCS | Mod: HCNC,CPTII,S$GLB, | Performed by: PHYSICIAN ASSISTANT

## 2022-02-09 PROCEDURE — 99203 PR OFFICE/OUTPT VISIT, NEW, LEVL III, 30-44 MIN: ICD-10-PCS | Mod: HCNC,S$GLB,, | Performed by: PHYSICIAN ASSISTANT

## 2022-02-09 PROCEDURE — 1101F PT FALLS ASSESS-DOCD LE1/YR: CPT | Mod: HCNC,CPTII,S$GLB, | Performed by: PHYSICIAN ASSISTANT

## 2022-02-09 PROCEDURE — 1126F AMNT PAIN NOTED NONE PRSNT: CPT | Mod: HCNC,CPTII,S$GLB, | Performed by: PHYSICIAN ASSISTANT

## 2022-02-09 PROCEDURE — 99203 OFFICE O/P NEW LOW 30 MIN: CPT | Mod: HCNC,S$GLB,, | Performed by: PHYSICIAN ASSISTANT

## 2022-02-09 PROCEDURE — 73080 X-RAY EXAM OF ELBOW: CPT | Mod: 26,HCNC,RT, | Performed by: RADIOLOGY

## 2022-02-09 PROCEDURE — 99999 PR PBB SHADOW E&M-EST. PATIENT-LVL III: ICD-10-PCS | Mod: PBBFAC,HCNC,, | Performed by: PHYSICIAN ASSISTANT

## 2022-02-09 PROCEDURE — 73080 XR ELBOW COMPLETE 3 VIEW RIGHT: ICD-10-PCS | Mod: 26,HCNC,RT, | Performed by: RADIOLOGY

## 2022-02-09 PROCEDURE — 99999 PR PBB SHADOW E&M-EST. PATIENT-LVL III: CPT | Mod: PBBFAC,HCNC,, | Performed by: PHYSICIAN ASSISTANT

## 2022-02-09 NOTE — PROGRESS NOTES
Chief Complaint & History of Present Illness:  Delphine Perdomo is a New patient 76 y.o. female who is seen here today with a complaint of    Chief Complaint   Patient presents with    Right Elbow - Pain    .  Patient is here today for evaluation treatment of painful area of swelling on the posterior aspect of her right elbow.  States she initially developed a large effusion approximately 2 months ago.  He is tender to lean or put pressure on has not affected her range of motion.  She states over the past 2 months most of the swelling has subsided she has retained a small hard mass in this area that is tender when she leans on it and can be bothersome at night.  She does state is occasionally mildly warm but has had no erythema or tenderness to light palpation here there has been no fevers chills or constitutional symptoms.  On a scale of 1-10, with 10 being worst pain imaginable, he rates this pain as 1 on good days and 4 on bad days.  she describes the pain as tender.    Review of patient's allergies indicates:   Allergen Reactions    Codeine      Other reaction(s): Unknown    Morphine      Other reaction(s): Unknown    Sulfa (sulfonamide antibiotics)      Other reaction(s): Rash         Current Outpatient Medications   Medication Sig Dispense Refill    amLODIPine (NORVASC) 5 MG tablet TAKE 1 TABLET BY MOUTH EVERY DAY 90 tablet 4    carbidopa-levodopa  mg (SINEMET)  mg per tablet Take 1 tablet by mouth 3 (three) times daily. 90 tablet 11    latanoprost 0.005 % ophthalmic solution Place one drop in right eye every night       No current facility-administered medications for this visit.       Past Medical History:   Diagnosis Date    Degenerative disc disease     sp cervical fusion,     Dislocation     hx of ant dislocation of hip prior     Glaucoma     Lichen sclerosus     Urge incontinence        Past Surgical History:   Procedure Laterality Date    APPENDECTOMY      CHOLECYSTECTOMY    "   EYE SURGERY      cataract surgery     HYSTERECTOMY      supracervical/ retained cervix     OOPHORECTOMY Bilateral     SPINE SURGERY      cervical fusion/plate and discectomy       Vital Signs (Most Recent)  There were no vitals filed for this visit.        Review of Systems:  ROS:  Constitutional: no fever or chills  Eyes: no visual changes, Positive for glaucoma  ENT: no nasal congestion or sore throat  Respiratory: no cough or shortness of breath  Cardiovascular: no chest pain or palpitations  Gastrointestinal: no nausea or vomiting, tolerating diet  Genitourinary: no hematuria or dysuria  Integument/Breast: no rash or pruritis  Hematologic/Lymphatic: no easy bruising or lymphadenopathy  Musculoskeletal: no arthralgias or myalgias  Neurological: no seizures or tremors, Positive for degenerative disc disease  Behavioral/Psych: no auditory or visual hallucinations  Endocrine: no heat or cold intolerance                OBJECTIVE:     PHYSICAL EXAM:  Height: 5' 2" (157.5 cm) Weight: 69.3 kg (152 lb 10.7 oz), General Appearance: Well nourished, well developed, in no acute distress.  Neurological: Mood & affect are normal.  right  Elbow:   History of injury: no  Pain: Description: mild, moderate and intermittent  Night pain: yes and if sleeps on affected side  Rest pain: yes and occasional  Quality: sharp and stabbing  Location: posterior  Mass/swelling: Size: stable and 4 cm in diameter  Neurological complaints: none  olecranon tenderness, olecranon effusion, radial pulse normal, negative Cozen  ROM: flexion arc 0-145, pronation 90, supination 90 and pain with terminal extension  Strength: flexion grade 5 of 5, extension grade 5 of 5, supination grade 5 of 5 and pronation grade 5 of 5      RADIOGRAPHS:  X-rays taken today films reviewed by me demonstrate no evidence of fracture dislocation or about the elbow joint spaces well maintained    ASSESSMENT/PLAN:       ICD-10-CM ICD-9-CM   1. Olecranon bursitis of " right elbow  M70.21 726.33   2. Right elbow pain  M25.521 719.42       Plan: We discussed with the patient at length all the different treatment options available for her elbow including anti-inflammatories, acetaminophen, rest, ice, physical therapy to include strengthening, range of motion exercise,  splinting,  occasional cortisone injections for temporary relief,  or possible surgical interventions.  Palpation of the affected area demonstrates dense mass in the olecranon bursal region.  Needle aspiration would likely be unproductive  MRI of the right elbow  Follow-up with Hand Service to discuss possible surgical interventions

## 2022-02-15 ENCOUNTER — HOSPITAL ENCOUNTER (OUTPATIENT)
Dept: RADIOLOGY | Facility: HOSPITAL | Age: 77
Discharge: HOME | End: 2022-02-15
Attending: PHYSICIAN ASSISTANT
Payer: MEDICARE

## 2022-02-15 DIAGNOSIS — M25.521 RIGHT ELBOW PAIN: ICD-10-CM

## 2022-02-15 PROCEDURE — 73221 MRI JOINT UPR EXTREM W/O DYE: CPT | Mod: TC,HCNC,RT

## 2022-02-15 PROCEDURE — 73221 MRI ELBOW WITHOUT CONTRAST RIGHT: ICD-10-PCS | Mod: 26,HCNC,RT, | Performed by: RADIOLOGY

## 2022-02-15 PROCEDURE — 73221 MRI JOINT UPR EXTREM W/O DYE: CPT | Mod: 26,HCNC,RT, | Performed by: RADIOLOGY

## 2022-02-18 ENCOUNTER — TELEPHONE (OUTPATIENT)
Dept: ORTHOPEDICS | Facility: CLINIC | Age: 77
End: 2022-02-18
Payer: MEDICARE

## 2022-02-18 NOTE — TELEPHONE ENCOUNTER
Spoke c pt. Confirmed 02/22/22 appt location & time c Dr. Johnson. Pt will call c additional questions/concerns in interim. Pt expressed understanding & was thankful.

## 2022-02-22 ENCOUNTER — TELEPHONE (OUTPATIENT)
Dept: ORTHOPEDICS | Facility: CLINIC | Age: 77
End: 2022-02-22
Payer: MEDICARE

## 2022-02-22 ENCOUNTER — OFFICE VISIT (OUTPATIENT)
Dept: ORTHOPEDICS | Facility: CLINIC | Age: 77
End: 2022-02-22
Payer: MEDICARE

## 2022-02-22 VITALS — BODY MASS INDEX: 27.97 KG/M2 | WEIGHT: 152 LBS | HEIGHT: 62 IN

## 2022-02-22 DIAGNOSIS — Z01.818 PRE-OP TESTING: Primary | ICD-10-CM

## 2022-02-22 DIAGNOSIS — M70.21 OLECRANON BURSITIS OF RIGHT ELBOW: Primary | ICD-10-CM

## 2022-02-22 PROCEDURE — 1101F PT FALLS ASSESS-DOCD LE1/YR: CPT | Mod: HCNC,CPTII,S$GLB, | Performed by: ORTHOPAEDIC SURGERY

## 2022-02-22 PROCEDURE — 99999 PR PBB SHADOW E&M-EST. PATIENT-LVL II: ICD-10-PCS | Mod: PBBFAC,HCNC,, | Performed by: ORTHOPAEDIC SURGERY

## 2022-02-22 PROCEDURE — 99204 PR OFFICE/OUTPT VISIT, NEW, LEVL IV, 45-59 MIN: ICD-10-PCS | Mod: HCNC,S$GLB,, | Performed by: ORTHOPAEDIC SURGERY

## 2022-02-22 PROCEDURE — 99999 PR PBB SHADOW E&M-EST. PATIENT-LVL II: CPT | Mod: PBBFAC,HCNC,, | Performed by: ORTHOPAEDIC SURGERY

## 2022-02-22 PROCEDURE — 3288F PR FALLS RISK ASSESSMENT DOCUMENTED: ICD-10-PCS | Mod: HCNC,CPTII,S$GLB, | Performed by: ORTHOPAEDIC SURGERY

## 2022-02-22 PROCEDURE — 1101F PR PT FALLS ASSESS DOC 0-1 FALLS W/OUT INJ PAST YR: ICD-10-PCS | Mod: HCNC,CPTII,S$GLB, | Performed by: ORTHOPAEDIC SURGERY

## 2022-02-22 PROCEDURE — 3288F FALL RISK ASSESSMENT DOCD: CPT | Mod: HCNC,CPTII,S$GLB, | Performed by: ORTHOPAEDIC SURGERY

## 2022-02-22 PROCEDURE — 1125F PR PAIN SEVERITY QUANTIFIED, PAIN PRESENT: ICD-10-PCS | Mod: HCNC,CPTII,S$GLB, | Performed by: ORTHOPAEDIC SURGERY

## 2022-02-22 PROCEDURE — 1159F MED LIST DOCD IN RCRD: CPT | Mod: HCNC,CPTII,S$GLB, | Performed by: ORTHOPAEDIC SURGERY

## 2022-02-22 PROCEDURE — 1125F AMNT PAIN NOTED PAIN PRSNT: CPT | Mod: HCNC,CPTII,S$GLB, | Performed by: ORTHOPAEDIC SURGERY

## 2022-02-22 PROCEDURE — 99204 OFFICE O/P NEW MOD 45 MIN: CPT | Mod: HCNC,S$GLB,, | Performed by: ORTHOPAEDIC SURGERY

## 2022-02-22 PROCEDURE — 1159F PR MEDICATION LIST DOCUMENTED IN MEDICAL RECORD: ICD-10-PCS | Mod: HCNC,CPTII,S$GLB, | Performed by: ORTHOPAEDIC SURGERY

## 2022-02-22 NOTE — TELEPHONE ENCOUNTER
Spoke c pt & pt's . Informed them that I assisted c surgery speech today, they asked for my name for their records. Also confirmed that Dr. jorge is referring pt's , Joseph to Dr. Fung for chest mass. Advised that message is being sent to his staff for appt scheduling. Pt & pt's  will call c additional questions/concerns in interim. Pt expressed understanding & was thankful.

## 2022-02-22 NOTE — TELEPHONE ENCOUNTER
----- Message from Josselyn Gresham sent at 2/22/2022  4:22 PM CST -----  Regarding: name of witness  Name of Who is Calling: Delphine           What is the request in detail: Patient is requesting a call back to get the name of the assistant that tigre the paperwork today. They cant make it out.           Can the clinic reply by [facility]: No           What Number to Call Back if not in [facility]: [phone]

## 2022-02-22 NOTE — PROGRESS NOTES
I have personally taken the history and examined this patient. I agree with the resident's note as stated above.  Plan for olecranon bursectomy excisional biopsy this is firm MRI was reviewed with patient we discussed postoperative protocol patient states is very painful to her every time she bumps in the middle the night is painful no numbness tingling   I have explained the risks, benefits, and alternatives of the procedure to the patient in great detail. The patient voices understanding and all questions have been answered. The patient agrees with to proceed as planned. Consents were performed in clinic.

## 2022-02-23 NOTE — H&P (VIEW-ONLY)
Clinic Note  Orthopaedics    SUBJECTIVE:     History of Present Illness:  Patient is a 76 y.o. female who  presents today with right elbow olecranon bursitis.  States has been present for about 4 months.  States that it was original little larger however became firm, mobile, painful over the last couple months.  She has trouble sleeping at night as she rolls over on it also has trouble doing yoga and thinks that her elbow.  She has attempted compression wraps however this is not resolved it.  Would like to proceed with excisional biopsy.    Review of patient's allergies indicates:   Allergen Reactions    Codeine      Other reaction(s): Unknown    Morphine      Other reaction(s): Unknown    Sulfa (sulfonamide antibiotics)      Other reaction(s): Rash       Past Medical History:   Diagnosis Date    Degenerative disc disease     sp cervical fusion,     Dislocation     hx of ant dislocation of hip prior     Glaucoma     Lichen sclerosus     Urge incontinence      Past Surgical History:   Procedure Laterality Date    APPENDECTOMY      CHOLECYSTECTOMY      EYE SURGERY      cataract surgery     HYSTERECTOMY      supracervical/ retained cervix     OOPHORECTOMY Bilateral     SPINE SURGERY      cervical fusion/plate and discectomy     Family History   Problem Relation Age of Onset    Hypertension Mother     Hypoglycemic Mother     Cancer Father         prostate cancer     Hypertension Sister     Diabetes Brother      Social History     Tobacco Use    Smoking status: Never Smoker    Smokeless tobacco: Never Used   Substance Use Topics    Alcohol use: Yes     Comment: socially    Drug use: No        Review of Systems:  Patient denies constitutional symptoms, cardiac symptoms, respiratory symptoms, GI symptoms.  The remainder of the musculoskeletal ROS is included in the HPI.      OBJECTIVE:     Physical Exam:  Gen:  No acute distress  CV:  Peripherally well-perfused.  Pulses 2+ bilaterally.  Lungs:   Normal respiratory effort.  Abdomen:  Soft, non-tender, non-distended  Head/Neck:  Normocephalic.  Atraumatic. No TTP, AROM and PROM intact without pain  Neuro:  CN intact without deficit, SILT throughout B/L Upper & Lower Extremities    MSK:    Right UE:  - skin intact, 3 x 4 cm area of olecranon bursitis to the posterior aspect of the elbow.  No signs of infection.  Full active and passive range of motion of the elbow.  - AROM and PROM of the intact  - AIN/PIN/Radial/Median/Ulnar Nerves assessed in isolation without deficit  - SILT throughout  - Radial & Ulnar arteries palpated 2+  - Capillary Refill <3s          Diagnostic Results:  X-rays right elbow were ordered and images reviewed by me.  These showed soft tissue mass of right elbow.  MRI showing complex fluid collection over the elbow consistent with olecranon bursitis.    ASSESSMENT/PLAN:     A/P: Delphine Perdomo is a 76 y.o. with right elbow olecranon bursitis.  No concern for septic olecranon bursitis.    Plan:    - Discussed with the patient extensively about the different management options both conservative and surgical options.  At this time she would like to proceed with excisional biopsy, olecranon bursectomy.  She has had extended get pain acid attempted a prolonged course of conservative management.  We will proceed with surgery at her earliest convenience.  Risks include pain, bleeding, scarring, nerve damage, stiffness, DVT, PE, nonhealing wound, chronic wound drainage, elbow pain, septic elbow, amputation and death.  After discussion of the risks patient wishes to proceed with surgery.  Consents were signed.      Jose G Haas M.D. PGY5  Orthopaedic Surgery

## 2022-03-09 ENCOUNTER — ANESTHESIA EVENT (OUTPATIENT)
Dept: SURGERY | Facility: HOSPITAL | Age: 77
End: 2022-03-09
Payer: MEDICARE

## 2022-03-09 NOTE — ANESTHESIA PREPROCEDURE EVALUATION
03/09/2022  Delphine Perdomo is a 76 y.o., female.    Chart review complete. Patient's medical history reviewed.  OK to proceed at [facility].    Johnny Pearce MD   3/9/2022          Pre-op Assessment    I have reviewed the Patient Summary Reports.     I have reviewed the Nursing Notes. I have reviewed the NPO Status.   I have reviewed the Medications.     Review of Systems  Anesthesia Hx:  No problems with previous Anesthesia  History of prior surgery of interest to airway management or planning: Previous anesthesia: General Denies Family Hx of Anesthesia complications.   Denies Personal Hx of Anesthesia complications.   Social:  Non-Smoker    Hematology/Oncology:  Hematology Normal   Oncology Normal     EENT/Dental:EENT/Dental Normal   Cardiovascular:   Exercise tolerance: good Hypertension    Pulmonary:  Pulmonary Normal    Renal/:  Renal/ Normal     Hepatic/GI:  Hepatic/GI Normal    Musculoskeletal:   Arthritis     Neurological:   Parkinson's disease   Endocrine:  Endocrine Normal    Dermatological:  Skin Normal    Psych:  Psychiatric Normal           Physical Exam  General: Well nourished, Cooperative and Alert    Airway:  Mallampati: III / II  Mouth Opening: Normal  TM Distance: Normal  Tongue: Normal  Neck ROM: Normal ROM    Dental:  Intact    Chest/Lungs:  Clear to auscultation, Normal Respiratory Rate    Heart:  Rate: Normal  Rhythm: Regular Rhythm  Sounds: Normal    Abdomen:  Normal, Soft, Nontender        Anesthesia Plan  Type of Anesthesia, risks & benefits discussed:    Anesthesia Type: Gen Natural Airway, MAC, Regional  Intra-op Monitoring Plan: Standard ASA Monitors  Post Op Pain Control Plan: multimodal analgesia and peripheral nerve block  Induction:  IV  Informed Consent: Informed consent signed with the Patient and all parties understand the risks and agree with anesthesia plan.  All  questions answered.   ASA Score: 2    Ready For Surgery From Anesthesia Perspective.     .

## 2022-03-11 ENCOUNTER — LAB VISIT (OUTPATIENT)
Dept: PRIMARY CARE CLINIC | Facility: CLINIC | Age: 77
End: 2022-03-11
Payer: MEDICARE

## 2022-03-11 ENCOUNTER — TELEPHONE (OUTPATIENT)
Dept: ORTHOPEDICS | Facility: CLINIC | Age: 77
End: 2022-03-11
Payer: MEDICARE

## 2022-03-11 DIAGNOSIS — Z01.818 PRE-OP TESTING: ICD-10-CM

## 2022-03-11 DIAGNOSIS — Z98.890 POST-OPERATIVE STATE: Primary | ICD-10-CM

## 2022-03-11 LAB
SARS-COV-2 RNA RESP QL NAA+PROBE: NOT DETECTED
SARS-COV-2- CYCLE NUMBER: NORMAL

## 2022-03-11 PROCEDURE — U0003 INFECTIOUS AGENT DETECTION BY NUCLEIC ACID (DNA OR RNA); SEVERE ACUTE RESPIRATORY SYNDROME CORONAVIRUS 2 (SARS-COV-2) (CORONAVIRUS DISEASE [COVID-19]), AMPLIFIED PROBE TECHNIQUE, MAKING USE OF HIGH THROUGHPUT TECHNOLOGIES AS DESCRIBED BY CMS-2020-01-R: HCPCS | Mod: HCNC | Performed by: ORTHOPAEDIC SURGERY

## 2022-03-11 PROCEDURE — U0005 INFEC AGEN DETEC AMPLI PROBE: HCPCS | Performed by: ORTHOPAEDIC SURGERY

## 2022-03-11 RX ORDER — ACETAMINOPHEN 500 MG
1000 TABLET ORAL 2 TIMES DAILY PRN
Qty: 40 TABLET | Refills: 0 | Status: SHIPPED | OUTPATIENT
Start: 2022-03-11

## 2022-03-11 RX ORDER — IBUPROFEN 600 MG/1
600 TABLET ORAL 3 TIMES DAILY PRN
Qty: 40 TABLET | Refills: 0 | Status: SHIPPED | OUTPATIENT
Start: 2022-03-11 | End: 2022-08-04

## 2022-03-11 NOTE — TELEPHONE ENCOUNTER
Spoke c pt. Informed pt of 8:00 a.m. arrival time for 03/14/22 surgery at the [facility]. Reminded pt of NPO status & PO appt. Pt expressed understanding & was thankful.       ----- Message from Pushpa Lopez sent at 3/11/2022  9:49 AM CST -----  Regarding: Requesting a call  Contact: APRIL VAZQUEZ [[id number]]  Name of Who is Calling:  APRIL VAZQUEZ [[id number]]        What is the request in detail: They are calling for procedure time/ and details, Please advise           Can the clinic reply by [facility]: No           What Number to Call Back if not in [facility]:[phone]

## 2022-03-14 ENCOUNTER — HOSPITAL ENCOUNTER (OUTPATIENT)
Facility: HOSPITAL | Age: 77
Discharge: HOME | End: 2022-03-14
Attending: ORTHOPAEDIC SURGERY | Admitting: ORTHOPAEDIC SURGERY
Payer: MEDICARE

## 2022-03-14 ENCOUNTER — ANESTHESIA (OUTPATIENT)
Dept: SURGERY | Facility: HOSPITAL | Age: 77
End: 2022-03-14
Payer: MEDICARE

## 2022-03-14 VITALS
SYSTOLIC BLOOD PRESSURE: 125 MMHG | RESPIRATION RATE: 19 BRPM | WEIGHT: 165 LBS | DIASTOLIC BLOOD PRESSURE: 58 MMHG | BODY MASS INDEX: 30.36 KG/M2 | TEMPERATURE: 98 F | HEART RATE: 62 BPM | OXYGEN SATURATION: 93 % | HEIGHT: 62 IN

## 2022-03-14 DIAGNOSIS — M70.30 BURSITIS OF ELBOW, UNSPECIFIED BURSA, UNSPECIFIED LATERALITY: Primary | ICD-10-CM

## 2022-03-14 DIAGNOSIS — M70.31 BURSITIS OF OTHER BURSA OF RIGHT ELBOW: ICD-10-CM

## 2022-03-14 PROBLEM — M70.21 OLECRANON BURSITIS OF RIGHT ELBOW: Status: ACTIVE | Noted: 2022-03-14

## 2022-03-14 PROCEDURE — 71000033 HC RECOVERY, INTIAL HOUR: Performed by: ORTHOPAEDIC SURGERY

## 2022-03-14 PROCEDURE — 71000015 HC POSTOP RECOV 1ST HR: Performed by: ORTHOPAEDIC SURGERY

## 2022-03-14 PROCEDURE — 87070 CULTURE OTHR SPECIMN AEROBIC: CPT | Mod: HCNC | Performed by: ORTHOPAEDIC SURGERY

## 2022-03-14 PROCEDURE — D9220A PRA ANESTHESIA: Mod: ANES,,, | Performed by: ANESTHESIOLOGY

## 2022-03-14 PROCEDURE — 64415 PR NERVE BLOCK INJ, ANES/STEROID, BRACHIAL PLEXUS, INCL IMAG GUIDANCE: ICD-10-PCS | Mod: 59,RT,, | Performed by: ANESTHESIOLOGY

## 2022-03-14 PROCEDURE — 25000003 PHARM REV CODE 250: Performed by: ORTHOPAEDIC SURGERY

## 2022-03-14 PROCEDURE — 63600175 PHARM REV CODE 636 W HCPCS: Performed by: SURGERY

## 2022-03-14 PROCEDURE — 87075 CULTR BACTERIA EXCEPT BLOOD: CPT | Mod: HCNC | Performed by: ORTHOPAEDIC SURGERY

## 2022-03-14 PROCEDURE — 87206 SMEAR FLUORESCENT/ACID STAI: CPT | Mod: HCNC | Performed by: ORTHOPAEDIC SURGERY

## 2022-03-14 PROCEDURE — D9220A PRA ANESTHESIA: Mod: CRNA,,, | Performed by: NURSE ANESTHETIST, CERTIFIED REGISTERED

## 2022-03-14 PROCEDURE — 11404 PR EXC SKIN BENIG 3.1-4 CM TRUNK,ARM,LEG: ICD-10-PCS | Mod: RT,,, | Performed by: ORTHOPAEDIC SURGERY

## 2022-03-14 PROCEDURE — 87102 FUNGUS ISOLATION CULTURE: CPT | Mod: HCNC | Performed by: ORTHOPAEDIC SURGERY

## 2022-03-14 PROCEDURE — 63600175 PHARM REV CODE 636 W HCPCS: Performed by: NURSE ANESTHETIST, CERTIFIED REGISTERED

## 2022-03-14 PROCEDURE — 87205 SMEAR GRAM STAIN: CPT | Mod: HCNC | Performed by: ORTHOPAEDIC SURGERY

## 2022-03-14 PROCEDURE — 25000003 PHARM REV CODE 250: Performed by: SURGERY

## 2022-03-14 PROCEDURE — 88307 PR  SURG PATH,LEVEL V: ICD-10-PCS | Mod: 26,,, | Performed by: PATHOLOGY

## 2022-03-14 PROCEDURE — 88307 TISSUE EXAM BY PATHOLOGIST: CPT | Mod: 26,,, | Performed by: PATHOLOGY

## 2022-03-14 PROCEDURE — 76942 ECHO GUIDE FOR BIOPSY: CPT | Mod: 26,,, | Performed by: ANESTHESIOLOGY

## 2022-03-14 PROCEDURE — 27201423 OPTIME MED/SURG SUP & DEVICES STERILE SUPPLY: Performed by: ORTHOPAEDIC SURGERY

## 2022-03-14 PROCEDURE — D9220A PRA ANESTHESIA: ICD-10-PCS | Mod: ANES,,, | Performed by: ANESTHESIOLOGY

## 2022-03-14 PROCEDURE — 37000008 HC ANESTHESIA 1ST 15 MINUTES: Performed by: ORTHOPAEDIC SURGERY

## 2022-03-14 PROCEDURE — 99900035 HC TECH TIME PER 15 MIN (STAT)

## 2022-03-14 PROCEDURE — 76942 PR U/S GUIDANCE FOR NEEDLE GUIDANCE: ICD-10-PCS | Mod: 26,,, | Performed by: ANESTHESIOLOGY

## 2022-03-14 PROCEDURE — 25000003 PHARM REV CODE 250: Performed by: NURSE ANESTHETIST, CERTIFIED REGISTERED

## 2022-03-14 PROCEDURE — 64415 NJX AA&/STRD BRCH PLXS IMG: CPT | Mod: RT,59 | Performed by: ANESTHESIOLOGY

## 2022-03-14 PROCEDURE — 64415 NJX AA&/STRD BRCH PLXS IMG: CPT | Mod: 59,RT,, | Performed by: ANESTHESIOLOGY

## 2022-03-14 PROCEDURE — 25000003 PHARM REV CODE 250: Performed by: ANESTHESIOLOGY

## 2022-03-14 PROCEDURE — 88307 TISSUE EXAM BY PATHOLOGIST: CPT | Performed by: PATHOLOGY

## 2022-03-14 PROCEDURE — 87116 MYCOBACTERIA CULTURE: CPT | Mod: HCNC | Performed by: ORTHOPAEDIC SURGERY

## 2022-03-14 PROCEDURE — 11404 EXC TR-EXT B9+MARG 3.1-4 CM: CPT | Mod: RT,,, | Performed by: ORTHOPAEDIC SURGERY

## 2022-03-14 PROCEDURE — 37000009 HC ANESTHESIA EA ADD 15 MINS: Performed by: ORTHOPAEDIC SURGERY

## 2022-03-14 PROCEDURE — 36000708 HC OR TIME LEV III 1ST 15 MIN: Performed by: ORTHOPAEDIC SURGERY

## 2022-03-14 PROCEDURE — 36000709 HC OR TIME LEV III EA ADD 15 MIN: Performed by: ORTHOPAEDIC SURGERY

## 2022-03-14 PROCEDURE — D9220A PRA ANESTHESIA: ICD-10-PCS | Mod: CRNA,,, | Performed by: NURSE ANESTHETIST, CERTIFIED REGISTERED

## 2022-03-14 PROCEDURE — 94761 N-INVAS EAR/PLS OXIMETRY MLT: CPT

## 2022-03-14 RX ORDER — DEXAMETHASONE SODIUM PHOSPHATE 4 MG/ML
INJECTION, SOLUTION INTRA-ARTICULAR; INTRALESIONAL; INTRAMUSCULAR; INTRAVENOUS; SOFT TISSUE
Status: DISCONTINUED | OUTPATIENT
Start: 2022-03-14 | End: 2022-03-14

## 2022-03-14 RX ORDER — CEFAZOLIN SODIUM 1 G/3ML
INJECTION, POWDER, FOR SOLUTION INTRAMUSCULAR; INTRAVENOUS
Status: DISCONTINUED | OUTPATIENT
Start: 2022-03-14 | End: 2022-03-14

## 2022-03-14 RX ORDER — LIDOCAINE HYDROCHLORIDE 20 MG/ML
INJECTION INTRAVENOUS
Status: DISCONTINUED | OUTPATIENT
Start: 2022-03-14 | End: 2022-03-14

## 2022-03-14 RX ORDER — MIDAZOLAM HYDROCHLORIDE 1 MG/ML
.5-4 INJECTION INTRAMUSCULAR; INTRAVENOUS
Status: DISCONTINUED | OUTPATIENT
Start: 2022-03-14 | End: 2022-08-04

## 2022-03-14 RX ORDER — BUPIVACAINE HYDROCHLORIDE 5 MG/ML
INJECTION, SOLUTION EPIDURAL; INTRACAUDAL
Status: DISCONTINUED | OUTPATIENT
Start: 2022-03-14 | End: 2022-03-14

## 2022-03-14 RX ORDER — PHENYLEPHRINE HYDROCHLORIDE 10 MG/ML
INJECTION INTRAVENOUS
Status: DISCONTINUED | OUTPATIENT
Start: 2022-03-14 | End: 2022-03-14

## 2022-03-14 RX ORDER — ONDANSETRON 2 MG/ML
INJECTION INTRAMUSCULAR; INTRAVENOUS
Status: DISCONTINUED | OUTPATIENT
Start: 2022-03-14 | End: 2022-03-14

## 2022-03-14 RX ORDER — FENTANYL CITRATE 50 UG/ML
25-200 INJECTION, SOLUTION INTRAMUSCULAR; INTRAVENOUS
Status: DISCONTINUED | OUTPATIENT
Start: 2022-03-14 | End: 2022-08-04

## 2022-03-14 RX ORDER — CEFAZOLIN SODIUM/WATER 2 G/20 ML
2 SYRINGE (ML) INTRAVENOUS ONCE
Status: DISCONTINUED | OUTPATIENT
Start: 2022-03-14 | End: 2022-08-04

## 2022-03-14 RX ORDER — BACITRACIN ZINC AND POLYMYXIN B SULFATE 500; 1000 [USP'U]/G; [USP'U]/G
OINTMENT TOPICAL
Status: DISCONTINUED | OUTPATIENT
Start: 2022-03-14 | End: 2022-03-14 | Stop reason: HOSPADM

## 2022-03-14 RX ORDER — SODIUM CHLORIDE 9 MG/ML
INJECTION, SOLUTION INTRAVENOUS CONTINUOUS
Status: DISCONTINUED | OUTPATIENT
Start: 2022-03-14 | End: 2022-03-14 | Stop reason: HOSPADM

## 2022-03-14 RX ORDER — FAMOTIDINE 10 MG/ML
INJECTION INTRAVENOUS
Status: DISCONTINUED | OUTPATIENT
Start: 2022-03-14 | End: 2022-03-14

## 2022-03-14 RX ORDER — ACETAMINOPHEN 500 MG
1000 TABLET ORAL
Status: COMPLETED | OUTPATIENT
Start: 2022-03-14 | End: 2022-03-14

## 2022-03-14 RX ORDER — PROPOFOL 10 MG/ML
VIAL (ML) INTRAVENOUS CONTINUOUS PRN
Status: DISCONTINUED | OUTPATIENT
Start: 2022-03-14 | End: 2022-03-14

## 2022-03-14 RX ORDER — PROPOFOL 10 MG/ML
VIAL (ML) INTRAVENOUS
Status: DISCONTINUED | OUTPATIENT
Start: 2022-03-14 | End: 2022-03-14

## 2022-03-14 RX ORDER — CELECOXIB 200 MG/1
400 CAPSULE ORAL ONCE
Status: DISCONTINUED | OUTPATIENT
Start: 2022-03-14 | End: 2022-08-04

## 2022-03-14 RX ORDER — LIDOCAINE HYDROCHLORIDE 10 MG/ML
1 INJECTION, SOLUTION EPIDURAL; INFILTRATION; INTRACAUDAL; PERINEURAL ONCE
Status: DISCONTINUED | OUTPATIENT
Start: 2022-03-14 | End: 2022-08-04

## 2022-03-14 RX ADMIN — CEFAZOLIN 2 G: 330 INJECTION, POWDER, FOR SOLUTION INTRAMUSCULAR; INTRAVENOUS at 09:03

## 2022-03-14 RX ADMIN — PROPOFOL 50 MG: 10 INJECTION, EMULSION INTRAVENOUS at 09:03

## 2022-03-14 RX ADMIN — PHENYLEPHRINE HYDROCHLORIDE 100 MCG: 10 INJECTION INTRAVENOUS at 10:03

## 2022-03-14 RX ADMIN — ACETAMINOPHEN 1000 MG: 500 TABLET ORAL at 08:03

## 2022-03-14 RX ADMIN — SODIUM CHLORIDE: 0.9 INJECTION, SOLUTION INTRAVENOUS at 08:03

## 2022-03-14 RX ADMIN — SODIUM CHLORIDE, SODIUM GLUCONATE, SODIUM ACETATE, POTASSIUM CHLORIDE, MAGNESIUM CHLORIDE, SODIUM PHOSPHATE, DIBASIC, AND POTASSIUM PHOSPHATE: .53; .5; .37; .037; .03; .012; .00082 INJECTION, SOLUTION INTRAVENOUS at 10:03

## 2022-03-14 RX ADMIN — MIDAZOLAM HYDROCHLORIDE 2 MG: 1 INJECTION, SOLUTION INTRAMUSCULAR; INTRAVENOUS at 08:03

## 2022-03-14 RX ADMIN — BUPIVACAINE HYDROCHLORIDE 30 ML: 5 INJECTION, SOLUTION EPIDURAL; INTRACAUDAL; PERINEURAL at 09:03

## 2022-03-14 RX ADMIN — FAMOTIDINE 20 MG: 10 INJECTION, SOLUTION INTRAVENOUS at 09:03

## 2022-03-14 RX ADMIN — LIDOCAINE HYDROCHLORIDE 100 MG: 20 INJECTION, SOLUTION INTRAVENOUS at 09:03

## 2022-03-14 RX ADMIN — ONDANSETRON 4 MG: 2 INJECTION, SOLUTION INTRAMUSCULAR; INTRAVENOUS at 10:03

## 2022-03-14 RX ADMIN — PROPOFOL 100 MCG/KG/MIN: 10 INJECTION, EMULSION INTRAVENOUS at 09:03

## 2022-03-14 RX ADMIN — DEXAMETHASONE SODIUM PHOSPHATE 8 MG: 4 INJECTION, SOLUTION INTRAMUSCULAR; INTRAVENOUS at 09:03

## 2022-03-14 NOTE — PLAN OF CARE
Pre op complete. Patient resting comfortably. Call light in reach.  to bedside shortly. Belongings in locker. RN held celebrex due to patient's sulfa allergy.

## 2022-03-14 NOTE — ADDENDUM NOTE
Addendum  created 03/14/22 1719 by Justo Cao MD    Clinical Note Signed, Diagnosis association updated, Intraprocedure Blocks edited, SmartForm saved

## 2022-03-14 NOTE — PLAN OF CARE
VSS. Patient able to tolerate oral liquids. Patient denies c/o pain. Patient/family received home medication per bedside delivery. Dressing intact. Sling on patient. No distress noted. Patient states she is ready for discharge. Discharge instructions reviewed with patient and family, verbalized understanding. IV discontinued with catheter tip intact. Family at bedside to help patient dress. Patient wheeled to lobby via staff.

## 2022-03-14 NOTE — DISCHARGE SUMMARY
[facility] - Surgery (Hospital)  Discharge Note  Short Stay    Procedure(s) (LRB):  BURSECTOMY, ELBOW,RIGHT (Right)    OUTCOME: Patient tolerated treatment/procedure well without complication and is now ready for discharge.    DISPOSITION: Home or Self Care    FINAL DIAGNOSIS:  Same as pre op    FOLLOWUP: In clinic    DISCHARGE INSTRUCTIONS:  Keep dressing clean dry and intact. Take pain medications as prescribed     TIME SPENT ON DISCHARGE: 10 minutes

## 2022-03-14 NOTE — ANESTHESIA PROCEDURE NOTES
Right supraclavicular single shot + ICB    Patient location during procedure: holding area   Block not for primary anesthetic.  Reason for block: at surgeon's request and post-op pain management   Post-op Pain Location: Right arm pain   Start time: 3/14/2022 9:30 AM  Timeout: 3/14/2022 9:30 AM   End time: 3/14/2022 9:35 AM    Staffing  Authorizing Provider: Justo Cao MD  Performing Provider: Justo Cao MD    Preanesthetic Checklist  Completed: patient identified, IV checked, site marked, risks and benefits discussed, surgical consent, monitors and equipment checked, pre-op evaluation and timeout performed  Peripheral Block  Patient position: supine  Prep: ChloraPrep  Patient monitoring: heart rate, cardiac monitor, continuous pulse ox, continuous capnometry and frequent blood pressure checks  Block type: supraclavicular  Laterality: right  Injection technique: single shot  Needle  Needle type: Stimuplex   Needle gauge: 22 G  Needle length: 2 in  Needle localization: anatomical landmarks and ultrasound guidance   -ultrasound image captured on disc.  Assessment  Injection assessment: negative aspiration, negative parasthesia and local visualized surrounding nerve  Paresthesia pain: none  Heart rate change: no  Slow fractionated injection: yes  Pain Tolerance: comfortable throughout block and no complaints  Medications:    Medications: bupivacaine (pf) (MARCAINE) injection 0.5% - Perineural   30 mL - 3/14/2022 9:35:00 AM    Additional Notes  Intercostal brachial field block performed with mepivacaine 1.5% 10ml for additional coverage.    VSS.  See anesthesia record.  Patient tolerated procedure well.

## 2022-03-14 NOTE — ANESTHESIA POSTPROCEDURE EVALUATION
Anesthesia Post Evaluation    Patient: Delphine Perdomo    Procedure(s) Performed: Procedure(s) (LRB):  BURSECTOMY, ELBOW,RIGHT (Right)    Final Anesthesia Type: general      Patient location during evaluation: PACU  Patient participation: Yes- Able to Participate  Level of consciousness: awake and alert and oriented  Post-procedure vital signs: reviewed and stable  Pain management: adequate  Airway patency: patent    PONV status at discharge: No PONV  Anesthetic complications: no      Cardiovascular status: hemodynamically stable  Respiratory status: unassisted, spontaneous ventilation and room air  Hydration status: euvolemic  Follow-up not needed.          Vitals Value Taken Time   /58 03/14/22 1117   Temp 36.4 °C (97.6 °F) 03/14/22 1040   Pulse 71 03/14/22 1117   Resp 17 03/14/22 1117   SpO2 92 % 03/14/22 1117   Vitals shown include unvalidated device data.      Event Time   Out of Recovery 11:10:00         Pain/Ab Score: Pain Rating Prior to Med Admin: 0 (3/14/2022  8:09 AM)  Ab Score: 10 (3/14/2022 11:02 AM)

## 2022-03-14 NOTE — BRIEF OP NOTE
Brief Operative Note     SUMMARY     Surgery Date: 3/14/2022     Surgeon(s) and Role:     * Kathryn Jacskon MD - Primary    Assisting Surgeon: None    Pre-op Diagnosis:  Olecranon bursitis of right elbow [M70.21]    Post-op Diagnosis:  Olecranon bursitis of right elbow [M70.21]    Procedure(s) (LRB):  BURSECTOMY, ELBOW,RIGHT (Right)    Anesthesia: Regional    Description of Procedure:   Right elbow bursectomy    Findings/Key Components:  Right elbow bursectomy - tissue specimen to pathology; cultures to microbiology    Estimated Blood Loss: Minimal         Specimens Removed:   Specimen (24h ago, onward)             Start     Ordered    03/14/22 1006  Specimen to Pathology, Surgery Orthopedics  Once        Comments: Pre-op Diagnosis: Olecranon bursitis of right elbow [M70.21]    Procedure(s):  BURSECTOMY, ELBOW,RIGHT     Number of specimens: 1    Name of specimens:   #1 MASS FROM RIGHT ELBOW   References:    Click here for ordering Quick Tip   Question Answer Comment   Procedure Type: Orthopedics    Release to patient Immediate        03/14/22 1009                Discharge Note      SUMMARY     Admit Date: 3/14/2022    Attending Physician: No att. providers found     Discharge Physician: No att. providers found    Discharge Date: 3/14/2022     Final Diagnosis: Olecranon bursitis of right elbow [M70.21]    Hospital Course: Patient was admitted for an outpatient procedure and tolerated the procedure well with no complications.    Disposition: Home or Self Care    Follow Up/Patient Instructions:   Discharge Medication List as of 3/14/2022 11:01 AM      CONTINUE these medications which have NOT CHANGED    Details   acetaminophen (TYLENOL) 500 MG tablet Take 2 tablets (1,000 mg total) by mouth 2 (two) times daily as needed for Pain., Starting Fri 3/11/2022, Normal      amLODIPine (NORVASC) 5 MG tablet TAKE 1 TABLET BY MOUTH EVERY DAY, Normal      ascorbic acid, vitamin C, (VITAMIN C) 100 MG tablet Take 100 mg by  mouth once daily., Historical Med      carbidopa-levodopa  mg (SINEMET)  mg per tablet Take 1 tablet by mouth 3 (three) times daily., Starting Mon 9/27/2021, Until Tue 9/27/2022, Normal      ibuprofen (ADVIL,MOTRIN) 600 MG tablet Take 1 tablet (600 mg total) by mouth 3 (three) times daily as needed for Pain., Starting Fri 3/11/2022, Normal      latanoprost 0.005 % ophthalmic solution Place one drop in right eye every night, Starting Wed 12/21/2011, Historical Med      multivitamin capsule Take 1 capsule by mouth once daily., Historical Med            Follow-up Information     Kathryn Jackson MD Follow up in 2 week(s).    Specialties: Hand Surgery, Orthopedic Surgery  Why: For suture removal, For wound re-check  Contact information:  [address]  [address]  [facility]  [facility] [address]  [phone]                       Discharge Procedure Orders (must include Diet, Follow-up, Activity)   Discharge Procedure Orders (must include Diet, Follow-up, Activity)   Ambulatory referral/consult to Physical/Occupational Therapy   Standing Status: Future   Referral Priority: Routine Referral Type: Physical Medicine   Referral Reason: Specialty Services Required     Ambulatory referral/consult to Physical/Occupational Therapy   Standing Status: Future   Referral Priority: Routine Referral Type: Physical Medicine   Referral Reason: Specialty Services Required   Requested Specialty: Occupational Therapy   Number of Visits Requested: 1     Diet Adult Regular     Notify your health care provider if you experience any of the following:  increased confusion or weakness     Notify your health care provider if you experience any of the following:  persistent dizziness, light-headedness, or visual disturbances     Notify your health care provider if you experience any of the following:  worsening rash     Notify your health care provider if you experience any of the following:  severe persistent  headache     Notify your health care provider if you experience any of the following:  difficulty breathing or increased cough     Notify your health care provider if you experience any of the following:  redness, tenderness, or signs of infection (pain, swelling, redness, odor or green/yellow discharge around incision site)     Notify your health care provider if you experience any of the following:  severe uncontrolled pain     Notify your health care provider if you experience any of the following:  persistent nausea and vomiting or diarrhea     Notify your health care provider if you experience any of the following:  temperature >100.4     Leave dressing on - Keep it clean, dry, and intact until clinic visit     Lifting restrictions     Leave dressing on - Keep it clean, dry, and intact until clinic visit     Notify your health care provider if you experience any of the following:  severe uncontrolled pain     Notify your health care provider if you experience any of the following:  persistent nausea and vomiting or diarrhea     Notify your health care provider if you experience any of the following:  temperature >100.4     Weight bearing restrictions (specify):   Order Comments: Non weight bearing on the operated hand     Activity as tolerated      Certification of Assistant at Surgery       Surgery Date: 3/14/2022     Participating Surgeons:  Surgeon(s) and Role:     * Kathryn Jackson MD - Primary    Procedures:  Procedure(s) (LRB):  BURSECTOMY, ELBOW,RIGHT (Right)    Assistant Surgeon's Certification of Necessity:  I understand that section 1842 (b) (6) (d) of the Social Security Act generally prohibits Medicare Part B reasonable charge payment for the services of assistants at surgery in teaching hospitals when qualified residents are available to furnish such services. I certify that the services for which payment is claimed were medically necessary, and that no qualified resident was available to  perform the services. I further understand that these services are subject to post-payment review by the Medicare carrier.      ANNAMARIA Aguilera    03/14/2022  12:16 PM

## 2022-03-14 NOTE — TRANSFER OF CARE
"Anesthesia Transfer of Care Note    Patient: Delphine Perdomo    Procedure(s) Performed: Procedure(s) (LRB):  BURSECTOMY, ELBOW,RIGHT (Right)    Patient location: PACU    Anesthesia Type: general and regional    Transport from OR: Transported from OR on 100% O2 by closed face mask with adequate spontaneous ventilation    Post pain: adequate analgesia    Post assessment: no apparent anesthetic complications and tolerated procedure well    Post vital signs: stable    Level of consciousness: awake and alert    Nausea/Vomiting: no nausea/vomiting    Complications: none    Transfer of care protocol was followed      Last vitals:   Visit Vitals  BP (!) 105/57 (BP Location: Left arm, Patient Position: Lying)   Pulse 71   Temp 36.8 °C (98.3 °F) (Oral)   Resp 13   Ht 5' 2" (1.575 m)   Wt 74.8 kg (165 lb)   SpO2 100%   Breastfeeding No   BMI 30.18 kg/m²     "

## 2022-03-14 NOTE — BRIEF OP NOTE
[facility] - Surgery ([facility])  Brief Operative Note    Surgery Date: 3/14/2022     Surgeon(s) and Role:     * Kathryn Jackson MD - Primary    Assisting Surgeon:   Deepak Landers MD - Fellow    Pre-op Diagnosis:  Olecranon bursitis of right elbow [M70.21]    Post-op Diagnosis:  Post-Op Diagnosis Codes:     * Olecranon bursitis of right elbow [M70.21]    Procedure(s) (LRB):  BURSECTOMY, ELBOW,RIGHT (Right)    Anesthesia: Regional    Operative Findings: See op report    Estimated Blood Loss: minimal    Specimens:   Specimen (24h ago, onward)            None            Discharge Note    OUTCOME: Patient tolerated treatment/procedure well without complication and is now ready for discharge.    DISPOSITION: Home or Self Care    FINAL DIAGNOSIS:  Same as pre op    FOLLOWUP: In clinic

## 2022-03-15 LAB
GRAM STN SPEC: NORMAL
GRAM STN SPEC: NORMAL

## 2022-03-15 NOTE — OP NOTE
[facility] ([facility])  Surgery Department  Operative Note    SUMMARY     Date of Procedure: 3/14/2022     Procedure: Procedure(s) (LRB):  BURSECTOMY, ELBOW,RIGHT (Right)     Surgeon(s) and Role:     * Kathryn Jackson MD - Primary    Assisting Surgeon: None    Pre-Operative Diagnosis: Olecranon bursitis of right elbow [M70.21]    Post-Operative Diagnosis: Post-Op Diagnosis Codes:     * Olecranon bursitis of right elbow [M70.21]    Anesthesia: Regional    Technical Procedures Used: surgery    Description of the Findings of the Procedure: Indication for procedure MERLINE Sanz  had an enlarging right elbow forearm mass imaging had been performed after much discussion with the patient elected for surgical intervention risks and benefits were explained the patient in clinic consents were signed in clinic was very bothersome to the patient procedure in detail the correct site was marked with the patient's participation holding area the patient underwent regional anesthesia was brought to the operating placed in supine position underwent MAC anesthesia well-padded nonsterile tourniquet was placed on the right upper extremity right upper extremities prepped draped normal sterile fashion time-out was conducted for the correct procedure to be indicated IV antibiotics given patient preoperatively incision was marked out over the mass is rough we measured 4 cm in length the arm was elevated tourniquet was insufflated 250 mmHg incision was made the mass was directly underneath the skin the mass measured 3.5 x 2 cm completely shelled out it was incised on the back table and showed that it had blood fill in it this was cultured but it did show while in completion the areas irrigated copious amounts normal saline Vicryl Monocryl Dermabond closed the skin sterile dressing was applied tourniquet was deflated brisk cap refill sued patient was transferred to the recovery area in stable condition     Postop plans patient keep  the dressing clean dry intact will see the patient back in 2 weeks time pathology report be given to patient she we checked the wound      Significant Surgical Tasks Conducted by the Assistant(s), if Applicable: retraction    Complications: No    Estimated Blood Loss (EBL): * No values recorded between 3/14/2022  9:55 AM and 3/14/2022 10:38 AM *           Implants: * No implants in log *    Specimens:   Specimen (24h ago, onward)                None                    Condition: Good    Disposition: PACU - hemodynamically stable.    Attestation: I performed the procedure.    Discharge Note    SUMMARY     Admit Date: 3/14/2022    Discharge Date and Time: 3/14/2022 11:35 AM    Hospital Course (synopsis of major diagnoses, care, treatment, and services provided during the course of the hospital stay): surgery     Final Diagnosis: Post-Op Diagnosis Codes:     * Olecranon bursitis of right elbow [M70.21]    Disposition: Home or Self Care    Follow Up/Patient Instructions:     Medications:  Reconciled Home Medications:      Medication List        CONTINUE taking these medications      acetaminophen 500 MG tablet  Commonly known as: TYLENOL  Take 2 tablets (1,000 mg total) by mouth 2 (two) times daily as needed for Pain.     amLODIPine 5 MG tablet  Commonly known as: NORVASC  TAKE 1 TABLET BY MOUTH EVERY DAY     carbidopa-levodopa  mg  mg per tablet  Commonly known as: SINEMET  Take 1 tablet by mouth 3 (three) times daily.     ibuprofen 600 MG tablet  Commonly known as: ADVIL,MOTRIN  Take 1 tablet (600 mg total) by mouth 3 (three) times daily as needed for Pain.     latanoprost 0.005 % ophthalmic solution  Place one drop in right eye every night     multivitamin capsule  Take 1 capsule by mouth once daily.     VITAMIN C 100 MG tablet  Generic drug: ascorbic acid (vitamin C)  Take 100 mg by mouth once daily.            Discharge Procedure Orders   Ambulatory referral/consult to Physical/Occupational Therapy    Standing Status: Future   Referral Priority: Routine Referral Type: Physical Medicine   Referral Reason: Specialty Services Required     Ambulatory referral/consult to Physical/Occupational Therapy   Standing Status: Future   Referral Priority: Routine Referral Type: Physical Medicine   Referral Reason: Specialty Services Required   Requested Specialty: Occupational Therapy   Number of Visits Requested: 1     Diet Adult Regular     Notify your health care provider if you experience any of the following:  increased confusion or weakness     Notify your health care provider if you experience any of the following:  persistent dizziness, light-headedness, or visual disturbances     Notify your health care provider if you experience any of the following:  worsening rash     Notify your health care provider if you experience any of the following:  severe persistent headache     Notify your health care provider if you experience any of the following:  difficulty breathing or increased cough     Notify your health care provider if you experience any of the following:  redness, tenderness, or signs of infection (pain, swelling, redness, odor or green/yellow discharge around incision site)     Notify your health care provider if you experience any of the following:  severe uncontrolled pain     Notify your health care provider if you experience any of the following:  persistent nausea and vomiting or diarrhea     Notify your health care provider if you experience any of the following:  temperature >100.4     Leave dressing on - Keep it clean, dry, and intact until clinic visit     Lifting restrictions     Leave dressing on - Keep it clean, dry, and intact until clinic visit     Notify your health care provider if you experience any of the following:  severe uncontrolled pain     Notify your health care provider if you experience any of the following:  persistent nausea and vomiting or diarrhea     Notify your health care  provider if you experience any of the following:  temperature >100.4     Weight bearing restrictions (specify):   Order Comments: Non weight bearing on the operated hand     Activity as tolerated      Follow-up Information       Kathryn Jackson MD Follow up in 2 week(s).    Specialties: Hand Surgery, Orthopedic Surgery  Why: For suture removal, For wound re-check  Contact information:  [address]  [facility] [address]  [facility] HAND [facility] [id number] [phone]

## 2022-03-19 LAB — BACTERIA SPEC AEROBE CULT: NO GROWTH

## 2022-03-22 LAB — BACTERIA SPEC ANAEROBE CULT: NORMAL

## 2022-03-23 LAB
FINAL PATHOLOGIC DIAGNOSIS: NORMAL
GROSS: NORMAL
Lab: NORMAL

## 2022-03-24 ENCOUNTER — TELEPHONE (OUTPATIENT)
Dept: ORTHOPEDICS | Facility: CLINIC | Age: 77
End: 2022-03-24
Payer: MEDICARE

## 2022-03-28 ENCOUNTER — OFFICE VISIT (OUTPATIENT)
Dept: ORTHOPEDICS | Facility: CLINIC | Age: 77
End: 2022-03-28
Payer: MEDICARE

## 2022-03-28 VITALS — HEIGHT: 62 IN | WEIGHT: 165 LBS | BODY MASS INDEX: 30.36 KG/M2

## 2022-03-28 DIAGNOSIS — Z98.890 POST-OPERATIVE STATE: Primary | ICD-10-CM

## 2022-03-28 PROCEDURE — 99999 PR PBB SHADOW E&M-EST. PATIENT-LVL II: ICD-10-PCS | Mod: PBBFAC,,, | Performed by: PHYSICIAN ASSISTANT

## 2022-03-28 PROCEDURE — 1126F AMNT PAIN NOTED NONE PRSNT: CPT | Mod: CPTII,S$GLB,, | Performed by: PHYSICIAN ASSISTANT

## 2022-03-28 PROCEDURE — 3288F PR FALLS RISK ASSESSMENT DOCUMENTED: ICD-10-PCS | Mod: CPTII,S$GLB,, | Performed by: PHYSICIAN ASSISTANT

## 2022-03-28 PROCEDURE — 99024 POSTOP FOLLOW-UP VISIT: CPT | Mod: S$GLB,,, | Performed by: PHYSICIAN ASSISTANT

## 2022-03-28 PROCEDURE — 1101F PT FALLS ASSESS-DOCD LE1/YR: CPT | Mod: CPTII,S$GLB,, | Performed by: PHYSICIAN ASSISTANT

## 2022-03-28 PROCEDURE — 1101F PR PT FALLS ASSESS DOC 0-1 FALLS W/OUT INJ PAST YR: ICD-10-PCS | Mod: CPTII,S$GLB,, | Performed by: PHYSICIAN ASSISTANT

## 2022-03-28 PROCEDURE — 99024 PR POST-OP FOLLOW-UP VISIT: ICD-10-PCS | Mod: S$GLB,,, | Performed by: PHYSICIAN ASSISTANT

## 2022-03-28 PROCEDURE — 3288F FALL RISK ASSESSMENT DOCD: CPT | Mod: CPTII,S$GLB,, | Performed by: PHYSICIAN ASSISTANT

## 2022-03-28 PROCEDURE — 1126F PR PAIN SEVERITY QUANTIFIED, NO PAIN PRESENT: ICD-10-PCS | Mod: CPTII,S$GLB,, | Performed by: PHYSICIAN ASSISTANT

## 2022-03-28 PROCEDURE — 99999 PR PBB SHADOW E&M-EST. PATIENT-LVL II: CPT | Mod: PBBFAC,,, | Performed by: PHYSICIAN ASSISTANT

## 2022-04-05 ENCOUNTER — PATIENT MESSAGE (OUTPATIENT)
Dept: ORTHOPEDICS | Facility: CLINIC | Age: 77
End: 2022-04-05
Payer: MEDICARE

## 2022-04-05 ENCOUNTER — CLINICAL SUPPORT (OUTPATIENT)
Dept: REHABILITATION | Facility: HOSPITAL | Age: 77
End: 2022-04-05
Payer: MEDICARE

## 2022-04-05 DIAGNOSIS — M70.31 BURSITIS OF OTHER BURSA OF RIGHT ELBOW: ICD-10-CM

## 2022-04-05 PROCEDURE — 97110 THERAPEUTIC EXERCISES: CPT

## 2022-04-05 PROCEDURE — 97010 HOT OR COLD PACKS THERAPY: CPT

## 2022-04-05 PROCEDURE — 97165 OT EVAL LOW COMPLEX 30 MIN: CPT

## 2022-04-05 NOTE — PROGRESS NOTES
[facility]  Occupational Therapy Initial Evaluation and Discharge Summary     Date: 4/5/2022  Name: Delphine Perdomo  Clinic Number: [id number]    Therapy Diagnosis:   Encounter Diagnosis   Name Primary?    Bursitis of other bursa of right elbow      Physician: Liat Quinn PA    Physician Orders: Eval and treat; modalities as needed    Medical Diagnosis: M70.31 (ICD-10-CM) - Bursitis of other bursa of right elbow  Surgical Procedure and Date: 3/14/2022, elbow bursectomy/ Date of Injury/Onset: 2 months   Evaluation Date: 4/5/2022  Insurance Authorization Period Expiration: 3/14/2022   Plan of Care Expiration: 8 weeks; 6/3/2022   Progress Note Due: 4 weeks;  5/6/2022  Date of Return to MD: DUANE   Visit # / Visits authorized: 1 / 1  FOTO: initial eval     Precautions:  Standard and parkinson's     Time In: 01:05 PM   Time Out: 02:00 PM   Total Appointment Time (timed & untimed codes): 55 minutes      SUBJECTIVE     Date of Onset: 2 months     History of Current Condition/Mechanism of Injury: Delphine reports: She said beginning in December she felt a sensitive area near her elbow. She was unable to tolerate the pressure on elbow.     Falls: 4-5 days     Involved Side: Right   Dominant Side: Right  Imaging: MRI EXAMINATION:  MRI ELBOW WITHOUT CONTRAST RIGHT     CLINICAL HISTORY:  Elbow pain, chronic, bursitis suspected, nondiagnostic xray;Pain in right elbow     TECHNIQUE:  Multiplanar multi-sequence MRI of the RIGHT elbow performed.     COMPARISON:  02/09/2022     FINDINGS:  Joint effusion: Small amount joint fluid     MEDIAL COMPARTMENT:     Ulnar collateral ligament:     --Anterior bundle: Partial interstitial tear not excluded midportion and proximal insertion.     --Posterior bundle/Floor of cubital tunnel: Mildly edematous.     Common flexor tendon: Normal     Medial epicondyle: Normal     LATERAL COMPARTMENT:     Radial collateral ligament: Normal     Lateral ulnar collateral  ligament: Normal     Common extensor tendon: Normal     Lateral epicondyle:  Normal     POSTERIOR COMPARTMENT:     Triceps: Normal     Olecranon: Normal     Bursitis: Olecranon bursitus, complex, 2.6*2.4 cm.     ANTERIOR COMPARTMENT:     Biceps: Normal     Brachialis: Normal     Bicipitoradial bursa: Normal     ARTICULATIONS:     Radio-capitellar joint: Normal     Ulno-humeral joint: Degenerative change with joint space narrowing and subchondral cystic change predominantly at the level of the humerus.  Cystic change identified at the level the coronoid process degenerative in nature..     Proximal radio-ulnar joint: Normal.     OTHER FINDINGS:     Bones: (Other than subarticular marrow): Normal.     Muscles: Normal.     Vessels: Normal.     Nerves: No abnormal signal or lesions seen within the expected location of the median/radial/ulnar nerve.  Cubital tunnel normal. Retinaculum intact.     Intra-articular bodies: None.     Impression:     Complex olecranon bursitis 2.6 x 2.4 cm with likely hemorrhagic debris.  Soft tissue neoplasm less likely.     Advanced degenerative change at the level of the medial joint, ulnohumeral level with joint space narrowing, cartilage loss and subchondral cystic change.        Electronically signed by: Lamont Lion MD  Date:                                            02/15/2022  Prior Therapy: n/a   Occupation:  Retired   Working presently: retired  Duties:     Functional Limitations/Social History:    Previous functional status includes: Independent with all ADLs.     Current Functional Status   Home/Living environment: lives with their spouse      Limitation of Functional Status as follows:   ADLs/IADLs:     - Feeding: I    - Bathing: I     - Dressing/Grooming: I    - Driving: mod I      Leisure: cooking, reading, volunteer ([employer])     Pain:  Functional Pain Scale Rating 0-10: Current 0/10, worst 2/10, best 0/10   Location: Medial posterior elbow   Description: TTP  along medial forearm   Aggravating Factors: Touching and Night Time  Easing Factors: relaxation, rest and advil     Patient's Goals for Therapy: Pt would like to complete therapy and be able to perform exercises at home.     Medical History:   Past Medical History:   Diagnosis Date    Degenerative disc disease     sp cervical fusion,     Dislocation     hx of ant dislocation of hip prior     Glaucoma     Lichen sclerosus     Urge incontinence        Surgical History:    has a past surgical history that includes Appendectomy; Cholecystectomy; Spine surgery; Eye surgery; Hysterectomy; Oophorectomy (Bilateral); and Bursectomy of elbow (Right, 3/14/2022).    Medications:   has a current medication list which includes the following prescription(s): acetaminophen, amlodipine, ascorbic acid (vitamin c), carbidopa-levodopa  mg, ibuprofen, latanoprost, and multivitamin, and the following Facility-Administered Medications: cefazolin in sterile water, celecoxib, fentanyl, lidocaine (pf) 10 mg/ml (1%), and midazolam.    Allergies:   Review of patient's allergies indicates:   Allergen Reactions    Codeine      Other reaction(s): Unknown    Morphine      Other reaction(s): Unknown    Sulfa (sulfonamide antibiotics)      Other reaction(s): Rash          OBJECTIVE     Observation/Appearance: slight bruising     Edema. Measured in centimeters.   4/11/2022 4/11/2022    Left Right   2in. Above elbow     Elbow crease 26.5 cm 27.5 cm    2 in Below Crease 25.5 cm 26.5 cm       Elbow and Wrist ROM. Measured in degrees.   4/11/2022 4/11/2022    Left Right   Elbow Ext/Flex WNL WNL   Supination/Pronation WNL WNL   Wrist Ext/Flex WNL WNL   Wrist RD/UD WNL WNL     Hand ROM. Measured in degrees.   4/11/2022 4/11/2022    Left Right        Index: MP   Able to make full composite flexion         Thumb: MP                  IP         Rad ADD/ABD         Pal ADD/ABD            Opposition        Strength (Dynamometer) and Pinch  Strength (Pinch Gauge)  Measured in pounds.   4/11/2022 4/11/2022    Left Right   Rung II  deferred   Key Pinch     3pt Pinch     2pt Pinch       Sensation: denies numbing and tingling    4/5/2022 4/5/2022    Left Right   [place]     Normal 1.65-2.83     Diminished Light Touch 3.22-3.61     Diminished Protective 3.84-4.31     Loss of Protective 4.56-6.65     Untestable >6.65     2 Point Discrimination     Static     Dynamic       Manual Muscle Test   4/11/2022 4/11/2022    Left Right   Wrist Extension   Deferred    Wrist Flexion     Radial Deviation     Ulnar Deviation     Supination     Pronation     Elbow Extension     Elbow Flexion           Limitation/Restriction for FOTO initial eval; elbow  Survey    Therapist reviewed FOTO scores for Delphine Perdomo on 4/5/2022.   FOTO documents entered into [employer] - see Media section.    Limitation Score: not taken %         Treatment   Total Treatment time (time-based codes) separate from Evaluation: 21 minutes    Delphine received the treatments listed below:     Supervised modalities after being cleared for contradictions: Hot Pack - 8 minutes prior to going over HEP     Manual therapy techniques: scar mobilization  were applied to the: posterior elbow  for 3 minutes, including:  - scar mobilization (cross friction)     Therapeutic exercises to develop strength, endurance and ROM for 10 minutes, including:  - elbow ROM (all ways)  - wrist ROM (all ways)       Patient Education and Home Exercises      Education provided:   - scar massage   - elbow ROM all ways (progress with reps for increased endurance)   - progression to normal gym routine   - do not perform heavy weight lifting at gym for another 3 weeks     Written Home Exercises Provided: yes.  Exercises were reviewed and Delphine was able to demonstrate them prior to the end of the session.  Delphine demonstrated good  understanding of the education provided. See EMR under Patient Instructions for exercises  provided during therapy sessions.     Pt was advised to perform these exercises free of pain, and to stop performing them if pain occurs.    Patient/Family Education: role of OT, goals for OT, scheduling/cancellations - pt verbalized understanding. Discussed insurance limitations with patient.    ASSESSMENT     Delphine Perdomo is a 76 y.o. female referred to outpatient occupational therapy and presents with a medical diagnosis of s/p elbow bursectomy.  Patient presents with the following therapy deficits: Decreased functional hand use and endurance and demonstrates limitations as described in the chart below.  The following goals were discussed with the patient and patient is in agreement with them as to be addressed. Pt would like to receive thorough HEP to complete at home. The patient's rehab potential is Good.     Anticipated barriers to occupational therapy: parkinson's  Pt has no cultural, educational or language barriers to learning provided.    Profile and History Assessment of Occupational Performance Level of Clinical Decision Making Complexity Score   Occupational Profile:   Delphine Perdomo is a 76 y.o. female who lives with their spouse and is retired Delphine Perdomo has difficulty with  ADLs and IADLs as listed previously, which  Affecting herdaily functional abilities.      Comorbidities:    has a past medical history of Degenerative disc disease, Dislocation, Glaucoma, Lichen sclerosus, and Urge incontinence.    Medical and Therapy History Review:   Expanded               Performance Deficits    Physical:  Muscle Power/Strength  Muscle Endurance    Cognitive:  No Deficits    Psychosocial:    No Deficits     Clinical Decision Making:  low    Assessment Process:  Problem-Focused Assessments    Modification/Need for Assistance:  Not Necessary    Intervention Selection:  Several Treatment Options       low  Based on PMHX, co morbidities , data from assessments and functional level of assistance  required with task and clinical presentation directly impacting function.       Goals:  1. Pt will be independent with HEP.     PLAN     Pt is being discharged from occupational therapy services.     Naina Flores OT

## 2022-04-05 NOTE — PATIENT INSTRUCTIONS
[facility], OCCUPATIONAL THERAPY  HOME EXERCISE PROGRAM                Complete 10 repetitions of each exercise 4-6 times a day:        Naina Flores OTR/L

## 2022-04-20 LAB — FUNGUS SPEC CULT: NORMAL

## 2022-04-23 ENCOUNTER — PES CALL (OUTPATIENT)
Dept: INTERNAL MEDICINE | Facility: CLINIC | Age: 77
End: 2022-04-23
Payer: MEDICARE

## 2022-05-03 LAB
ACID FAST MOD KINY STN SPEC: NORMAL
MYCOBACTERIUM SPEC QL CULT: NORMAL

## 2022-05-06 ENCOUNTER — IMMUNIZATION (OUTPATIENT)
Dept: INTERNAL MEDICINE | Facility: CLINIC | Age: 77
End: 2022-05-06
Payer: MEDICARE

## 2022-05-06 DIAGNOSIS — Z23 NEED FOR VACCINATION: Primary | ICD-10-CM

## 2022-05-06 PROCEDURE — 91300 COVID-19, MRNA, LNP-S, PF, 30 MCG/0.3 ML DOSE VACCINE: CPT | Mod: PBBFAC | Performed by: INTERNAL MEDICINE

## 2022-05-10 ENCOUNTER — PATIENT MESSAGE (OUTPATIENT)
Dept: NEUROLOGY | Facility: CLINIC | Age: 77
End: 2022-05-10
Payer: MEDICARE

## 2022-05-30 ENCOUNTER — PATIENT MESSAGE (OUTPATIENT)
Dept: ADMINISTRATIVE | Facility: HOSPITAL | Age: 77
End: 2022-05-30
Payer: MEDICARE

## 2022-06-03 ENCOUNTER — PATIENT MESSAGE (OUTPATIENT)
Dept: INTERNAL MEDICINE | Facility: CLINIC | Age: 77
End: 2022-06-03
Payer: MEDICARE

## 2022-06-06 DIAGNOSIS — G20.A1 PD (PARKINSON'S DISEASE): Primary | ICD-10-CM

## 2022-06-06 NOTE — TELEPHONE ENCOUNTER
Pt labs has been scheduled   
Will update some labs a few days prior to her appointment  Order in for schedulnig   
Breathing – normal variations in rate and rhythm, unlabored; grunting absent or intermittent and improving; intercostal, supracostal and subcostal muscles with normal excursion and not retracting; breath sounds are clear or mildly bronchovesicular, symmetric, with adequate intensity and without rales.

## 2022-08-01 ENCOUNTER — LAB VISIT (OUTPATIENT)
Dept: LAB | Facility: HOSPITAL | Age: 77
End: 2022-08-01
Attending: INTERNAL MEDICINE
Payer: MEDICARE

## 2022-08-01 DIAGNOSIS — G20.A1 PD (PARKINSON'S DISEASE): ICD-10-CM

## 2022-08-01 LAB
ALBUMIN SERPL BCP-MCNC: 4.1 G/DL (ref 3.5–5.2)
ALP SERPL-CCNC: 72 U/L (ref 55–135)
ALT SERPL W/O P-5'-P-CCNC: 18 U/L (ref 10–44)
ANION GAP SERPL CALC-SCNC: 9 MMOL/L (ref 8–16)
AST SERPL-CCNC: 13 U/L (ref 10–40)
BASOPHILS # BLD AUTO: 0.07 K/UL (ref 0–0.2)
BASOPHILS NFR BLD: 1.1 % (ref 0–1.9)
BILIRUB SERPL-MCNC: 0.4 MG/DL (ref 0.1–1)
BUN SERPL-MCNC: 17 MG/DL (ref 8–23)
CALCIUM SERPL-MCNC: 9.6 MG/DL (ref 8.7–10.5)
CHLORIDE SERPL-SCNC: 103 MMOL/L (ref 95–110)
CHOLEST SERPL-MCNC: 187 MG/DL (ref 120–199)
CHOLEST/HDLC SERPL: 3.3 {RATIO} (ref 2–5)
CO2 SERPL-SCNC: 30 MMOL/L (ref 23–29)
CREAT SERPL-MCNC: 0.8 MG/DL (ref 0.5–1.4)
DIFFERENTIAL METHOD: ABNORMAL
EOSINOPHIL # BLD AUTO: 0.1 K/UL (ref 0–0.5)
EOSINOPHIL NFR BLD: 1.9 % (ref 0–8)
ERYTHROCYTE [DISTWIDTH] IN BLOOD BY AUTOMATED COUNT: 11.9 % (ref 11.5–14.5)
EST. GFR  (NO RACE VARIABLE): >60 ML/MIN/1.73 M^2
GLUCOSE SERPL-MCNC: 108 MG/DL (ref 70–110)
HCT VFR BLD AUTO: 44.4 % (ref 37–48.5)
HDLC SERPL-MCNC: 56 MG/DL (ref 40–75)
HDLC SERPL: 29.9 % (ref 20–50)
HGB BLD-MCNC: 14.6 G/DL (ref 12–16)
IMM GRANULOCYTES # BLD AUTO: 0.01 K/UL (ref 0–0.04)
IMM GRANULOCYTES NFR BLD AUTO: 0.2 % (ref 0–0.5)
LDLC SERPL CALC-MCNC: 109.8 MG/DL (ref 63–159)
LYMPHOCYTES # BLD AUTO: 2 K/UL (ref 1–4.8)
LYMPHOCYTES NFR BLD: 30.9 % (ref 18–48)
MCH RBC QN AUTO: 31.6 PG (ref 27–31)
MCHC RBC AUTO-ENTMCNC: 32.9 G/DL (ref 32–36)
MCV RBC AUTO: 96 FL (ref 82–98)
MONOCYTES # BLD AUTO: 0.4 K/UL (ref 0.3–1)
MONOCYTES NFR BLD: 5.7 % (ref 4–15)
NEUTROPHILS # BLD AUTO: 3.8 K/UL (ref 1.8–7.7)
NEUTROPHILS NFR BLD: 60.2 % (ref 38–73)
NONHDLC SERPL-MCNC: 131 MG/DL
NRBC BLD-RTO: 0 /100 WBC
PLATELET # BLD AUTO: 294 K/UL (ref 150–450)
PMV BLD AUTO: 10.6 FL (ref 9.2–12.9)
POTASSIUM SERPL-SCNC: 4.5 MMOL/L (ref 3.5–5.1)
PROT SERPL-MCNC: 6.9 G/DL (ref 6–8.4)
RBC # BLD AUTO: 4.62 M/UL (ref 4–5.4)
SODIUM SERPL-SCNC: 142 MMOL/L (ref 136–145)
TRIGL SERPL-MCNC: 106 MG/DL (ref 30–150)
WBC # BLD AUTO: 6.32 K/UL (ref 3.9–12.7)

## 2022-08-01 PROCEDURE — 80053 COMPREHEN METABOLIC PANEL: CPT | Performed by: INTERNAL MEDICINE

## 2022-08-01 PROCEDURE — 80061 LIPID PANEL: CPT | Performed by: INTERNAL MEDICINE

## 2022-08-01 PROCEDURE — 85025 COMPLETE CBC W/AUTO DIFF WBC: CPT | Performed by: INTERNAL MEDICINE

## 2022-08-01 PROCEDURE — 36415 COLL VENOUS BLD VENIPUNCTURE: CPT | Performed by: INTERNAL MEDICINE

## 2022-08-04 ENCOUNTER — OFFICE VISIT (OUTPATIENT)
Dept: INTERNAL MEDICINE | Facility: CLINIC | Age: 77
End: 2022-08-04
Payer: MEDICARE

## 2022-08-04 VITALS
WEIGHT: 168.19 LBS | RESPIRATION RATE: 18 BRPM | DIASTOLIC BLOOD PRESSURE: 70 MMHG | BODY MASS INDEX: 30.77 KG/M2 | OXYGEN SATURATION: 99 % | HEART RATE: 81 BPM | SYSTOLIC BLOOD PRESSURE: 120 MMHG

## 2022-08-04 DIAGNOSIS — G20.A1 PD (PARKINSON'S DISEASE): ICD-10-CM

## 2022-08-04 DIAGNOSIS — H26.9 CATARACT, UNSPECIFIED CATARACT TYPE, UNSPECIFIED LATERALITY: ICD-10-CM

## 2022-08-04 DIAGNOSIS — H53.9 VISION DISTURBANCE: ICD-10-CM

## 2022-08-04 DIAGNOSIS — Z01.818 PREOP EXAM FOR INTERNAL MEDICINE: ICD-10-CM

## 2022-08-04 DIAGNOSIS — I10 PRIMARY HYPERTENSION: Primary | ICD-10-CM

## 2022-08-04 PROCEDURE — 3078F DIAST BP <80 MM HG: CPT | Mod: CPTII,S$GLB,, | Performed by: INTERNAL MEDICINE

## 2022-08-04 PROCEDURE — 1101F PR PT FALLS ASSESS DOC 0-1 FALLS W/OUT INJ PAST YR: ICD-10-PCS | Mod: CPTII,S$GLB,, | Performed by: INTERNAL MEDICINE

## 2022-08-04 PROCEDURE — 3074F SYST BP LT 130 MM HG: CPT | Mod: CPTII,S$GLB,, | Performed by: INTERNAL MEDICINE

## 2022-08-04 PROCEDURE — 3078F PR MOST RECENT DIASTOLIC BLOOD PRESSURE < 80 MM HG: ICD-10-PCS | Mod: CPTII,S$GLB,, | Performed by: INTERNAL MEDICINE

## 2022-08-04 PROCEDURE — 99999 PR PBB SHADOW E&M-EST. PATIENT-LVL III: ICD-10-PCS | Mod: PBBFAC,,, | Performed by: INTERNAL MEDICINE

## 2022-08-04 PROCEDURE — 3074F PR MOST RECENT SYSTOLIC BLOOD PRESSURE < 130 MM HG: ICD-10-PCS | Mod: CPTII,S$GLB,, | Performed by: INTERNAL MEDICINE

## 2022-08-04 PROCEDURE — 1159F MED LIST DOCD IN RCRD: CPT | Mod: CPTII,S$GLB,, | Performed by: INTERNAL MEDICINE

## 2022-08-04 PROCEDURE — 3288F PR FALLS RISK ASSESSMENT DOCUMENTED: ICD-10-PCS | Mod: CPTII,S$GLB,, | Performed by: INTERNAL MEDICINE

## 2022-08-04 PROCEDURE — 3288F FALL RISK ASSESSMENT DOCD: CPT | Mod: CPTII,S$GLB,, | Performed by: INTERNAL MEDICINE

## 2022-08-04 PROCEDURE — 1101F PT FALLS ASSESS-DOCD LE1/YR: CPT | Mod: CPTII,S$GLB,, | Performed by: INTERNAL MEDICINE

## 2022-08-04 PROCEDURE — 1160F RVW MEDS BY RX/DR IN RCRD: CPT | Mod: CPTII,S$GLB,, | Performed by: INTERNAL MEDICINE

## 2022-08-04 PROCEDURE — 1160F PR REVIEW ALL MEDS BY PRESCRIBER/CLIN PHARMACIST DOCUMENTED: ICD-10-PCS | Mod: CPTII,S$GLB,, | Performed by: INTERNAL MEDICINE

## 2022-08-04 PROCEDURE — 99214 OFFICE O/P EST MOD 30 MIN: CPT | Mod: S$GLB,,, | Performed by: INTERNAL MEDICINE

## 2022-08-04 PROCEDURE — 1159F PR MEDICATION LIST DOCUMENTED IN MEDICAL RECORD: ICD-10-PCS | Mod: CPTII,S$GLB,, | Performed by: INTERNAL MEDICINE

## 2022-08-04 PROCEDURE — 99214 PR OFFICE/OUTPT VISIT, EST, LEVL IV, 30-39 MIN: ICD-10-PCS | Mod: S$GLB,,, | Performed by: INTERNAL MEDICINE

## 2022-08-04 PROCEDURE — 1126F PR PAIN SEVERITY QUANTIFIED, NO PAIN PRESENT: ICD-10-PCS | Mod: CPTII,S$GLB,, | Performed by: INTERNAL MEDICINE

## 2022-08-04 PROCEDURE — 99999 PR PBB SHADOW E&M-EST. PATIENT-LVL III: CPT | Mod: PBBFAC,,, | Performed by: INTERNAL MEDICINE

## 2022-08-04 PROCEDURE — 1126F AMNT PAIN NOTED NONE PRSNT: CPT | Mod: CPTII,S$GLB,, | Performed by: INTERNAL MEDICINE

## 2022-08-04 NOTE — PROGRESS NOTES
Subjective:       Patient ID: Delphine Perdomo is a 76 y.o. female.    Chief Complaint: Pre-op Exam  This is a 76-year-old who presents today for follow-up she has been having some issues with her vision and has glaucoma and cataracts follows with outlying ophthalmologist Dr. Veliz.  She reports she is planning cataract surgery later this month is in her left eye she previously had the other eye done   Since last visit she also had surgery on her right elbow she had a lump that was growing and causing some discomfort she had a removed was benign growth.  She continues to have mild tremors and continues to follow with neurology for her Parkinson's she has had no further falls and has been taking her medicines regularly continues to follow-up with neurology.  She tries to go to the gym regularly he denies chest pain or shortness Suit breath with activity and his weight is down a few lb since her last visit.  She needed a form completed for her surgery    HPI  Review of Systems   HENT:        Vision change  Has cataract left planning surgery    Respiratory: Negative for shortness of breath.    Cardiovascular: Negative for chest pain.   Musculoskeletal:        Right elbow surgery improved  Benign growth    Neurological:        Tremors stable        Objective:     Blood pressure 120/70, pulse 81, resp. rate 18, weight 76.3 kg (168 lb 3.4 oz), SpO2 99 %.    Physical Exam  Constitutional:       General: She is not in acute distress.  HENT:      Head: Normocephalic.   Neck:      Comments: Surgical scar    Cardiovascular:      Rate and Rhythm: Normal rate and regular rhythm.      Heart sounds: Normal heart sounds. No murmur heard.    No friction rub. No gallop.   Pulmonary:      Effort: Pulmonary effort is normal. No respiratory distress.      Breath sounds: Normal breath sounds.   Abdominal:      General: Bowel sounds are normal.      Palpations: Abdomen is soft. There is no mass.      Tenderness: There is no abdominal  tenderness.   Musculoskeletal:      Comments: Surgical scar right elbow  Non tender    Skin:     Findings: No erythema.   Neurological:      Mental Status: She is alert.      Comments: Mild visible tremor          Assessment:       1. Primary hypertension    2. PD (Parkinson's disease)    3. Cataract, unspecified cataract type, unspecified laterality    4. Preop exam for internal medicine    5. Vision disturbance        Plan:       Delphine Simms was seen today for pre-op exam.    Diagnoses and all orders for this visit:    Primary hypertension  Blood pressure acceptable remains on amlodipine will continue current regimen  Blood pressure well controlled    PD (Parkinson's disease)  History of with mild tremors continues to follow with neurology remains on Sinemet    Cataract, unspecified cataract type, unspecified laterality  Preop exam for internal medicine  Vision disturbance  Patient is asymptomatic from cardiovascular standpoint and recent labs reviewed she is cleared to proceed with anesthesia and cataract surgery as planned patient is form completed    djd cervical spine/spinal stenosis stable she had prior neck surgery

## 2022-08-24 ENCOUNTER — OFFICE VISIT (OUTPATIENT)
Dept: NEUROLOGY | Facility: CLINIC | Age: 77
End: 2022-08-24
Payer: MEDICARE

## 2022-08-24 ENCOUNTER — PATIENT MESSAGE (OUTPATIENT)
Dept: NEUROLOGY | Facility: CLINIC | Age: 77
End: 2022-08-24

## 2022-08-24 VITALS
BODY MASS INDEX: 30.51 KG/M2 | SYSTOLIC BLOOD PRESSURE: 111 MMHG | HEART RATE: 72 BPM | DIASTOLIC BLOOD PRESSURE: 74 MMHG | WEIGHT: 165.81 LBS | HEIGHT: 62 IN

## 2022-08-24 DIAGNOSIS — G20.A1 PD (PARKINSON'S DISEASE): ICD-10-CM

## 2022-08-24 PROCEDURE — 99214 PR OFFICE/OUTPT VISIT, EST, LEVL IV, 30-39 MIN: ICD-10-PCS | Mod: S$GLB,,, | Performed by: PSYCHIATRY & NEUROLOGY

## 2022-08-24 PROCEDURE — 3078F DIAST BP <80 MM HG: CPT | Mod: CPTII,S$GLB,, | Performed by: PSYCHIATRY & NEUROLOGY

## 2022-08-24 PROCEDURE — 99214 OFFICE O/P EST MOD 30 MIN: CPT | Mod: S$GLB,,, | Performed by: PSYCHIATRY & NEUROLOGY

## 2022-08-24 PROCEDURE — 3078F PR MOST RECENT DIASTOLIC BLOOD PRESSURE < 80 MM HG: ICD-10-PCS | Mod: CPTII,S$GLB,, | Performed by: PSYCHIATRY & NEUROLOGY

## 2022-08-24 PROCEDURE — 99999 PR PBB SHADOW E&M-EST. PATIENT-LVL III: ICD-10-PCS | Mod: PBBFAC,,, | Performed by: PSYCHIATRY & NEUROLOGY

## 2022-08-24 PROCEDURE — 3074F SYST BP LT 130 MM HG: CPT | Mod: CPTII,S$GLB,, | Performed by: PSYCHIATRY & NEUROLOGY

## 2022-08-24 PROCEDURE — 1126F PR PAIN SEVERITY QUANTIFIED, NO PAIN PRESENT: ICD-10-PCS | Mod: CPTII,S$GLB,, | Performed by: PSYCHIATRY & NEUROLOGY

## 2022-08-24 PROCEDURE — 99999 PR PBB SHADOW E&M-EST. PATIENT-LVL III: CPT | Mod: PBBFAC,,, | Performed by: PSYCHIATRY & NEUROLOGY

## 2022-08-24 PROCEDURE — 1126F AMNT PAIN NOTED NONE PRSNT: CPT | Mod: CPTII,S$GLB,, | Performed by: PSYCHIATRY & NEUROLOGY

## 2022-08-24 PROCEDURE — 3074F PR MOST RECENT SYSTOLIC BLOOD PRESSURE < 130 MM HG: ICD-10-PCS | Mod: CPTII,S$GLB,, | Performed by: PSYCHIATRY & NEUROLOGY

## 2022-08-24 PROCEDURE — 1160F RVW MEDS BY RX/DR IN RCRD: CPT | Mod: CPTII,S$GLB,, | Performed by: PSYCHIATRY & NEUROLOGY

## 2022-08-24 PROCEDURE — 1160F PR REVIEW ALL MEDS BY PRESCRIBER/CLIN PHARMACIST DOCUMENTED: ICD-10-PCS | Mod: CPTII,S$GLB,, | Performed by: PSYCHIATRY & NEUROLOGY

## 2022-08-24 PROCEDURE — 1159F MED LIST DOCD IN RCRD: CPT | Mod: CPTII,S$GLB,, | Performed by: PSYCHIATRY & NEUROLOGY

## 2022-08-24 PROCEDURE — 1159F PR MEDICATION LIST DOCUMENTED IN MEDICAL RECORD: ICD-10-PCS | Mod: CPTII,S$GLB,, | Performed by: PSYCHIATRY & NEUROLOGY

## 2022-08-24 RX ORDER — CARBIDOPA 25 MG/1
25 TABLET ORAL 3 TIMES DAILY
Qty: 90 TABLET | Refills: 11 | Status: SHIPPED | OUTPATIENT
Start: 2022-08-24 | End: 2022-08-24

## 2022-08-24 NOTE — ASSESSMENT & PLAN NOTE
L-sided, likely typical PD   L pill rolling tremor, L bradykinesia, L cogwheel rigidity   Decreased L arm swing  Continue cd/ld 1 tab TID -- discussed protein likely decreasing absorption which is why she is having less nausea, may consider decreasing to 1/2 tab TID -- patient would like to stay with current regimen  Adding carbidopa 25 mg    Medi diet and exercise as able

## 2022-08-24 NOTE — PROGRESS NOTES
Chief Complaint: PD, left-sided      Interval History:   Hx:  - last seen by Arlyn 11/24/21  - Dx updated for typical L-sided PD,  --- bradykinesia, pill rolling tremor, cog wheeling, decreased arm swing all left side  - denied hallucination at time of last visit  - continued cd/ld  mg 1x TID  - Hx cervical spinal stenosis        New misc:  - reporting new hairloss - substantial   - coincides with beginning cd/ld        Gait:  - walks frequently and goes to the gym, so notes some stumbling  - notes some toe catching when walking on track due to slight slope  -  -      Tremor:  - tremor at baseline w/o major changes  -  -      Sleep:  - at baseline, generally able to sleep through the night  -  -      Cognition/Memory/Mood:  - reports no notable differences in these domains  -  -      Rx:  - reports cd/ld nausea when taking  - currently not taking with heavy protein  - trying to take the pill alone and then eat after  - one episode of vomiting recently  -  -

## 2022-08-24 NOTE — PROGRESS NOTES
Name: Delphine Perdomo  MRN: [id number]   CSN: [id number]      Date: 08/24/2022    Referring physician:  No referring provider defined for this encounter.    Chief Complaint: tremor     Interval History:  - has had hair loss since starting ldopa, just thinning, not clumps  - is better on the ldopa in general, tremor better and no falls  - is more conscious of her left foot while walking  - had successful trips to [place] to see grandson play baseball, also a trip to [place]  - enjoys walking around [place] for exercise  - some nausea from taking the ldopa on an empty stomach  - stretches and works on posture       From Nov 2021  - ldopa trial last visit   - accompanied by    - has noticed some improvement in tremors   - also has noticed improvement in her balance and able to go down stairs more easily   -  has noticed improvement in tremor   - when she first started taking it, caused nausea  - feels better whenever she eats protein with her medicine   - no falls   -  has noticed improvement in shuffling   - she has noticed that she doesn't swing her L arm as much   - still going to the gym   - denies hallucinations        From Sept 2021: Delphine Perdomo is a R HANDED 76 y.o. female with a medical issues significant for cervical DDD with myelopathy s/p surgery in 2011 who presents for tremor. Accompanied by . Tremor started a year ago. Noticed it first in the left hand. About two years ago started feeling some gait instability. Didn't feel as stable on her bike as she had prior. Notices the tremor mostly at rest. Some L leg tremor as well. No tremor on the R side. Sometimes L hand tremor wakes her up. 3-4 falls in the past month, didn't step up high enough on the stairs. Fell trying to get out of bed. Denies feeling lightheaded or dizzy with falls.  has noticed some shuffling of her feet. Having trouble putting on necklaces.    No improvement of tremor with EtOH.  has  noticed some masked facies. States that she looks more serious.   Goes to the gym and gets on the treadmill and bike.   Sleeps 8-9 hours a night, takes a nap during the day.     Family History: grandmother had tremors      Neuroleptic Exposure: none     Nonmotor/Premotor ROS:  Anosmia: poor sense of smell -- attributes to sinus issues   Dysarthria/Hypophonia: normal   Dysphagia/Sialorrhea: none   Depression:  has noticed she is more tired lately   Cognitive slowing: good,  agrees   Hallucinations: none   Urinary changes: frequency, nocturia   Constipation: one BM every couple of days   Orthostasis: none   Falls: as above   Freezing: none   Micrographia: not that she has noticed   Sleep issues:  -MESSI: none   -RBD: none       Review of Systems:   Review of Systems   Constitutional: Negative for chills, fever and malaise/fatigue.   HENT: Negative for hearing loss.    Eyes: Negative for blurred vision and double vision.   Respiratory: Negative for cough, shortness of breath and stridor.    Cardiovascular: Negative for chest pain and leg swelling.   Gastrointestinal: Negative for constipation, diarrhea and nausea.   Genitourinary: Positive for frequency. Negative for urgency.   Musculoskeletal: Positive for falls.   Skin: Negative for itching and rash.   Neurological: Positive for tremors. Negative for dizziness, loss of consciousness and weakness.   Psychiatric/Behavioral: Negative for hallucinations and memory loss.           Past Medical History: The patient  has a past medical history of Degenerative disc disease, Dislocation, Glaucoma, Lichen sclerosus, and Urge incontinence.    Social History: The patient  reports that she has never smoked. She has never used smokeless tobacco. She reports current alcohol use. She reports that she does not use drugs.    Family History: Their family history includes Cancer in her father; Diabetes in her brother; Hypertension in her mother and sister; Hypoglycemic in  "her mother.    Allergies: Codeine, Morphine, and Sulfa (sulfonamide antibiotics)     Meds:   Current Outpatient Medications on File Prior to Visit   Medication Sig Dispense Refill    amLODIPine (NORVASC) 5 MG tablet TAKE 1 TABLET BY MOUTH EVERY DAY 90 tablet 4    ascorbic acid, vitamin C, (VITAMIN C) 100 MG tablet Take 100 mg by mouth once daily.      carbidopa-levodopa  mg (SINEMET)  mg per tablet Take 1 tablet by mouth 3 (three) times daily. 90 tablet 11    latanoprost 0.005 % ophthalmic solution Place one drop in right eye every night      multivitamin capsule Take 1 capsule by mouth once daily.      acetaminophen (TYLENOL) 500 MG tablet Take 2 tablets (1,000 mg total) by mouth 2 (two) times daily as needed for Pain. (Patient not taking: Reported on 8/24/2022) 40 tablet 0     No current facility-administered medications on file prior to visit.       Exam:  /74 (BP Location: Right arm, Patient Position: Sitting, BP Method: Medium (Automatic))   Pulse 72   Ht 5' 2" (1.575 m)   Wt 75.2 kg (165 lb 12.6 oz)   BMI 30.32 kg/m²     Constitutional  Well-developed, well-nourished, appears stated age   Ophthalmoscopic  No papilledema with no hemorrhages or exudates bilaterally   Cardiovascular  Radial pulses 2+ and symmetric, no LE edema bilaterally   Neurological    * Mental status  MOCA =      - Orientation  Oriented to person, place, time, and situation     - Memory   Intact recent and remote     - Attention/concentration  Attentive, vigilant during exam     - Language  Naming & repetition intact, +2-step commands     - Fund of knowledge  Aware of current events     - Executive  Well-organized thoughts     - Other     * Cranial nerves       - CN II  PERRL, visual fields full to confrontation     - CN III, IV, VI  Extraocular movements full, normal pursuits and saccades     - CN V  Sensation V1 - V3 intact     - CN VII  Face strong and symmetric bilaterally     - CN VIII  Hearing intact bilaterally "     - CN IX, X  Palate raises midline and symmetric     - CN XI  SCM and trapezius 5/5 bilaterally     - CN XII  Tongue midline   * Motor  Muscle bulk normal, strength 5/5 throughout   * Sensory   Intact to temperature and vibration throughout   * Coordination  No dysmetria with finger-to-nose or heel-to-shin   * Gait  See below.   * Deep tendon reflexes  3+ and symmetric throughout   Babinski downgoing bilaterally   * Specialized movement exam  No hypophonic speech, hyperphonic, pleasant and animated    mild facial masking.   L cogwheel rigidity   L bradykinesia.   No other dystonia, chorea, athetosis, myoclonus, or tics.   No motor impersistence.   Normal-based gait.   No shortened stride length.    No postural instability.      resting tremor of L hand -- pill-rolling    L re-emergent tremor   decreased L arm swing    retropulsion on exam today      Laboratory/Radiological:  - Results:  Lab Visit on 08/01/2022   Component Date Value Ref Range Status    WBC 08/01/2022 6.32  3.90 - 12.70 K/uL Final    RBC 08/01/2022 4.62  4.00 - 5.40 M/uL Final    Hemoglobin 08/01/2022 14.6  12.0 - 16.0 g/dL Final    Hematocrit 08/01/2022 44.4  37.0 - 48.5 % Final    MCV 08/01/2022 96  82 - 98 fL Final    MCH 08/01/2022 31.6 (A) 27.0 - 31.0 pg Final    MCHC 08/01/2022 32.9  32.0 - 36.0 g/dL Final    RDW 08/01/2022 11.9  11.5 - 14.5 % Final    Platelets 08/01/2022 294  150 - 450 K/uL Final    MPV 08/01/2022 10.6  9.2 - 12.9 fL Final    Immature Granulocytes 08/01/2022 0.2  0.0 - 0.5 % Final    Gran # (ANC) 08/01/2022 3.8  1.8 - 7.7 K/uL Final    Immature Grans (Abs) 08/01/2022 0.01  0.00 - 0.04 K/uL Final    Lymph # 08/01/2022 2.0  1.0 - 4.8 K/uL Final    Mono # 08/01/2022 0.4  0.3 - 1.0 K/uL Final    Eos # 08/01/2022 0.1  0.0 - 0.5 K/uL Final    Baso # 08/01/2022 0.07  0.00 - 0.20 K/uL Final    nRBC 08/01/2022 0  0 /100 WBC Final    Gran % 08/01/2022 60.2  38.0 - 73.0 % Final    Lymph % 08/01/2022 30.9  18.0 - 48.0 % Final    Mono  % 08/01/2022 5.7  4.0 - 15.0 % Final    Eosinophil % 08/01/2022 1.9  0.0 - 8.0 % Final    Basophil % 08/01/2022 1.1  0.0 - 1.9 % Final    Differential Method 08/01/2022 Automated   Final    Sodium 08/01/2022 142  136 - 145 mmol/L Final    Potassium 08/01/2022 4.5  3.5 - 5.1 mmol/L Final    Chloride 08/01/2022 103  95 - 110 mmol/L Final    CO2 08/01/2022 30 (A) 23 - 29 mmol/L Final    Glucose 08/01/2022 108  70 - 110 mg/dL Final    BUN 08/01/2022 17  8 - 23 mg/dL Final    Creatinine 08/01/2022 0.8  0.5 - 1.4 mg/dL Final    Calcium 08/01/2022 9.6  8.7 - 10.5 mg/dL Final    Total Protein 08/01/2022 6.9  6.0 - 8.4 g/dL Final    Albumin 08/01/2022 4.1  3.5 - 5.2 g/dL Final    Total Bilirubin 08/01/2022 0.4  0.1 - 1.0 mg/dL Final    Alkaline Phosphatase 08/01/2022 72  55 - 135 U/L Final    AST 08/01/2022 13  10 - 40 U/L Final    ALT 08/01/2022 18  10 - 44 U/L Final    Anion Gap 08/01/2022 9  8 - 16 mmol/L Final    eGFR 08/01/2022 >60.0  >60 mL/min/1.73 m^2 Final    Cholesterol 08/01/2022 187  120 - 199 mg/dL Final    Triglycerides 08/01/2022 106  30 - 150 mg/dL Final    HDL 08/01/2022 56  40 - 75 mg/dL Final    LDL Cholesterol 08/01/2022 109.8  63.0 - 159.0 mg/dL Final    HDL/Cholesterol Ratio 08/01/2022 29.9  20.0 - 50.0 % Final    Total Cholesterol/HDL Ratio 08/01/2022 3.3  2.0 - 5.0 Final    Non-HDL Cholesterol 08/01/2022 131  mg/dL Final       - Independent review of images:       - Independent review of consultant's notes: Henry          Assessment//Plan:   Parkinson's Disease  L-sided, likely typical PD   L pill rolling tremor, L bradykinesia, L cogwheel rigidity   Decreased L arm swing  Continue cd/ld 1 tab TID -- discussed protein likely decreasing absorption which is why she is having less nausea, may consider decreasing to 1/2 tab TID -- patient would like to stay with current regimen    Trial of LODOSYN (CARBIDOPA) NOW WITH EACH DOSE.        Fernando Gordillo MD, MPH  Division of Movement and Memory  Disorders  [facility]

## 2022-08-31 ENCOUNTER — TELEPHONE (OUTPATIENT)
Dept: NEUROLOGY | Facility: CLINIC | Age: 77
End: 2022-08-31
Payer: MEDICARE

## 2022-08-31 ENCOUNTER — PES CALL (OUTPATIENT)
Dept: ADMINISTRATIVE | Facility: CLINIC | Age: 77
End: 2022-08-31
Payer: MEDICARE

## 2022-08-31 NOTE — TELEPHONE ENCOUNTER
----- Message from Rosie Eldridge sent at 8/31/2022 11:28 AM CDT -----  Type:  Patient Returning Call    Who Called:pt  Joseph      Does the patient know what this is regarding?:rx  Would the patient rather a call back or a response via [facility]? Call back   Best Call Back Number:[phone]  Additional Information: pt  sent message august 24th and still has yet to speak with someone in regards to rx. Pt would like to speak with someone directly in office asap. Pt medication is for parkinson's disease and pt needs it asap and needs dr to verify that she needs it. [employer] Clinical request dept 6 .

## 2022-09-30 ENCOUNTER — IMMUNIZATION (OUTPATIENT)
Dept: INTERNAL MEDICINE | Facility: CLINIC | Age: 77
End: 2022-09-30
Payer: MEDICARE

## 2022-09-30 PROCEDURE — G0008 FLU VACCINE - QUADRIVALENT - ADJUVANTED: ICD-10-PCS | Mod: S$GLB,,, | Performed by: INTERNAL MEDICINE

## 2022-09-30 PROCEDURE — 90694 FLU VACCINE - QUADRIVALENT - ADJUVANTED: ICD-10-PCS | Mod: S$GLB,,, | Performed by: INTERNAL MEDICINE

## 2022-09-30 PROCEDURE — 90694 VACC AIIV4 NO PRSRV 0.5ML IM: CPT | Mod: S$GLB,,, | Performed by: INTERNAL MEDICINE

## 2022-09-30 PROCEDURE — G0008 ADMIN INFLUENZA VIRUS VAC: HCPCS | Mod: S$GLB,,, | Performed by: INTERNAL MEDICINE

## 2022-10-26 ENCOUNTER — PES CALL (OUTPATIENT)
Dept: ADMINISTRATIVE | Facility: OTHER | Age: 77
End: 2022-10-26
Payer: MEDICARE

## 2022-10-31 ENCOUNTER — IMMUNIZATION (OUTPATIENT)
Dept: INTERNAL MEDICINE | Facility: CLINIC | Age: 77
End: 2022-10-31
Payer: MEDICARE

## 2022-10-31 DIAGNOSIS — Z23 NEED FOR VACCINATION: Primary | ICD-10-CM

## 2022-10-31 PROCEDURE — 91312 COVID-19, MRNA, LNP-S, BIVALENT BOOSTER, PF, 30 MCG/0.3 ML DOSE: CPT | Mod: S$GLB,,, | Performed by: INTERNAL MEDICINE

## 2022-10-31 PROCEDURE — 0124A COVID-19, MRNA, LNP-S, BIVALENT BOOSTER, PF, 30 MCG/0.3 ML DOSE: CPT | Mod: CV19,PBBFAC | Performed by: INTERNAL MEDICINE

## 2022-10-31 PROCEDURE — 91312 COVID-19, MRNA, LNP-S, BIVALENT BOOSTER, PF, 30 MCG/0.3 ML DOSE: ICD-10-PCS | Mod: S$GLB,,, | Performed by: INTERNAL MEDICINE

## 2022-11-15 ENCOUNTER — TELEPHONE (OUTPATIENT)
Dept: INTERNAL MEDICINE | Facility: CLINIC | Age: 77
End: 2022-11-15
Payer: MEDICARE

## 2022-11-15 DIAGNOSIS — Z00.00 ANNUAL PHYSICAL EXAM: Primary | ICD-10-CM

## 2022-11-15 DIAGNOSIS — I10 ESSENTIAL HYPERTENSION: ICD-10-CM

## 2022-11-15 NOTE — TELEPHONE ENCOUNTER
Called patient with no answer, left a VM that her question regarding labs was forwarded to Dr. Figueroa.  Labs pended if needed.

## 2022-11-15 NOTE — TELEPHONE ENCOUNTER
----- Message from Jeanette Gillette sent at 11/15/2022 12:01 PM CST -----  Contact: /Joseph/[phone]  type: Lab    Caller is requesting to schedule their Lab appointment prior to annual appointment.  Order is not listed in EPIC.  Please enter order and contact patient to schedule.    Name of Caller:Joseph    Preferred Date and Time of Labs:1/17 7am     Date of Annual Physical Appointment:1/24    Where would they like the lab performed?[facility]     Would the patient rather a call back or a response via [facility]? Call back     Best Call Back Number:[phone]    Additional Information: please call to confirm the appt has been scheduled

## 2023-01-17 ENCOUNTER — LAB VISIT (OUTPATIENT)
Dept: LAB | Facility: HOSPITAL | Age: 78
End: 2023-01-17
Attending: INTERNAL MEDICINE
Payer: MEDICARE

## 2023-01-17 DIAGNOSIS — I10 ESSENTIAL HYPERTENSION: ICD-10-CM

## 2023-01-17 DIAGNOSIS — Z00.00 ANNUAL PHYSICAL EXAM: ICD-10-CM

## 2023-01-17 LAB
ALBUMIN SERPL BCP-MCNC: 4 G/DL (ref 3.5–5.2)
ALP SERPL-CCNC: 73 U/L (ref 55–135)
ALT SERPL W/O P-5'-P-CCNC: 7 U/L (ref 10–44)
ANION GAP SERPL CALC-SCNC: 12 MMOL/L (ref 8–16)
AST SERPL-CCNC: 18 U/L (ref 10–40)
BASOPHILS # BLD AUTO: 0.06 K/UL (ref 0–0.2)
BASOPHILS NFR BLD: 1.3 % (ref 0–1.9)
BILIRUB DIRECT SERPL-MCNC: 0.2 MG/DL (ref 0.1–0.3)
BILIRUB SERPL-MCNC: 0.5 MG/DL (ref 0.1–1)
BUN SERPL-MCNC: 19 MG/DL (ref 8–23)
CALCIUM SERPL-MCNC: 9.6 MG/DL (ref 8.7–10.5)
CHLORIDE SERPL-SCNC: 101 MMOL/L (ref 95–110)
CO2 SERPL-SCNC: 27 MMOL/L (ref 23–29)
CREAT SERPL-MCNC: 0.8 MG/DL (ref 0.5–1.4)
DIFFERENTIAL METHOD: ABNORMAL
EOSINOPHIL # BLD AUTO: 0.1 K/UL (ref 0–0.5)
EOSINOPHIL NFR BLD: 2.3 % (ref 0–8)
ERYTHROCYTE [DISTWIDTH] IN BLOOD BY AUTOMATED COUNT: 12.1 % (ref 11.5–14.5)
EST. GFR  (NO RACE VARIABLE): >60 ML/MIN/1.73 M^2
GLUCOSE SERPL-MCNC: 96 MG/DL (ref 70–110)
HCT VFR BLD AUTO: 43.7 % (ref 37–48.5)
HGB BLD-MCNC: 13.9 G/DL (ref 12–16)
IMM GRANULOCYTES # BLD AUTO: 0.02 K/UL (ref 0–0.04)
IMM GRANULOCYTES NFR BLD AUTO: 0.4 % (ref 0–0.5)
LYMPHOCYTES # BLD AUTO: 2.2 K/UL (ref 1–4.8)
LYMPHOCYTES NFR BLD: 46.4 % (ref 18–48)
MCH RBC QN AUTO: 31 PG (ref 27–31)
MCHC RBC AUTO-ENTMCNC: 31.8 G/DL (ref 32–36)
MCV RBC AUTO: 98 FL (ref 82–98)
MONOCYTES # BLD AUTO: 0.3 K/UL (ref 0.3–1)
MONOCYTES NFR BLD: 6.8 % (ref 4–15)
NEUTROPHILS # BLD AUTO: 2 K/UL (ref 1.8–7.7)
NEUTROPHILS NFR BLD: 42.8 % (ref 38–73)
NRBC BLD-RTO: 0 /100 WBC
PLATELET # BLD AUTO: 260 K/UL (ref 150–450)
PMV BLD AUTO: 10.1 FL (ref 9.2–12.9)
POTASSIUM SERPL-SCNC: 4.1 MMOL/L (ref 3.5–5.1)
PROT SERPL-MCNC: 7.3 G/DL (ref 6–8.4)
RBC # BLD AUTO: 4.48 M/UL (ref 4–5.4)
SODIUM SERPL-SCNC: 140 MMOL/L (ref 136–145)
TSH SERPL DL<=0.005 MIU/L-ACNC: 3.18 UIU/ML (ref 0.4–4)
WBC # BLD AUTO: 4.7 K/UL (ref 3.9–12.7)

## 2023-01-17 PROCEDURE — 80048 BASIC METABOLIC PNL TOTAL CA: CPT | Mod: HCNC | Performed by: INTERNAL MEDICINE

## 2023-01-17 PROCEDURE — 85025 COMPLETE CBC W/AUTO DIFF WBC: CPT | Mod: HCNC | Performed by: INTERNAL MEDICINE

## 2023-01-17 PROCEDURE — 36415 COLL VENOUS BLD VENIPUNCTURE: CPT | Mod: HCNC | Performed by: INTERNAL MEDICINE

## 2023-01-17 PROCEDURE — 80076 HEPATIC FUNCTION PANEL: CPT | Mod: HCNC | Performed by: INTERNAL MEDICINE

## 2023-01-17 PROCEDURE — 84443 ASSAY THYROID STIM HORMONE: CPT | Mod: HCNC | Performed by: INTERNAL MEDICINE

## 2023-01-24 ENCOUNTER — OFFICE VISIT (OUTPATIENT)
Dept: INTERNAL MEDICINE | Facility: CLINIC | Age: 78
End: 2023-01-24
Payer: MEDICARE

## 2023-01-24 VITALS
BODY MASS INDEX: 30.51 KG/M2 | SYSTOLIC BLOOD PRESSURE: 120 MMHG | HEIGHT: 62 IN | DIASTOLIC BLOOD PRESSURE: 74 MMHG | WEIGHT: 165.81 LBS | HEART RATE: 82 BPM | OXYGEN SATURATION: 98 %

## 2023-01-24 DIAGNOSIS — Z12.31 ENCOUNTER FOR SCREENING MAMMOGRAM FOR BREAST CANCER: ICD-10-CM

## 2023-01-24 DIAGNOSIS — M48.02 SPINAL STENOSIS IN CERVICAL REGION: ICD-10-CM

## 2023-01-24 DIAGNOSIS — Z00.00 ANNUAL PHYSICAL EXAM: Primary | ICD-10-CM

## 2023-01-24 DIAGNOSIS — G20.A1 PD (PARKINSON'S DISEASE): ICD-10-CM

## 2023-01-24 DIAGNOSIS — I10 PRIMARY HYPERTENSION: ICD-10-CM

## 2023-01-24 PROCEDURE — 3288F FALL RISK ASSESSMENT DOCD: CPT | Mod: HCNC,CPTII,S$GLB, | Performed by: INTERNAL MEDICINE

## 2023-01-24 PROCEDURE — 1160F PR REVIEW ALL MEDS BY PRESCRIBER/CLIN PHARMACIST DOCUMENTED: ICD-10-PCS | Mod: HCNC,CPTII,S$GLB, | Performed by: INTERNAL MEDICINE

## 2023-01-24 PROCEDURE — 1101F PT FALLS ASSESS-DOCD LE1/YR: CPT | Mod: HCNC,CPTII,S$GLB, | Performed by: INTERNAL MEDICINE

## 2023-01-24 PROCEDURE — 1101F PR PT FALLS ASSESS DOC 0-1 FALLS W/OUT INJ PAST YR: ICD-10-PCS | Mod: HCNC,CPTII,S$GLB, | Performed by: INTERNAL MEDICINE

## 2023-01-24 PROCEDURE — 3078F PR MOST RECENT DIASTOLIC BLOOD PRESSURE < 80 MM HG: ICD-10-PCS | Mod: HCNC,CPTII,S$GLB, | Performed by: INTERNAL MEDICINE

## 2023-01-24 PROCEDURE — 3074F PR MOST RECENT SYSTOLIC BLOOD PRESSURE < 130 MM HG: ICD-10-PCS | Mod: HCNC,CPTII,S$GLB, | Performed by: INTERNAL MEDICINE

## 2023-01-24 PROCEDURE — 3078F DIAST BP <80 MM HG: CPT | Mod: HCNC,CPTII,S$GLB, | Performed by: INTERNAL MEDICINE

## 2023-01-24 PROCEDURE — 3288F PR FALLS RISK ASSESSMENT DOCUMENTED: ICD-10-PCS | Mod: HCNC,CPTII,S$GLB, | Performed by: INTERNAL MEDICINE

## 2023-01-24 PROCEDURE — 1160F RVW MEDS BY RX/DR IN RCRD: CPT | Mod: HCNC,CPTII,S$GLB, | Performed by: INTERNAL MEDICINE

## 2023-01-24 PROCEDURE — 99397 PR PREVENTIVE VISIT,EST,65 & OVER: ICD-10-PCS | Mod: HCNC,S$GLB,, | Performed by: INTERNAL MEDICINE

## 2023-01-24 PROCEDURE — 3074F SYST BP LT 130 MM HG: CPT | Mod: HCNC,CPTII,S$GLB, | Performed by: INTERNAL MEDICINE

## 2023-01-24 PROCEDURE — 1159F PR MEDICATION LIST DOCUMENTED IN MEDICAL RECORD: ICD-10-PCS | Mod: HCNC,CPTII,S$GLB, | Performed by: INTERNAL MEDICINE

## 2023-01-24 PROCEDURE — 99999 PR PBB SHADOW E&M-EST. PATIENT-LVL III: CPT | Mod: PBBFAC,HCNC,, | Performed by: INTERNAL MEDICINE

## 2023-01-24 PROCEDURE — 1125F AMNT PAIN NOTED PAIN PRSNT: CPT | Mod: HCNC,CPTII,S$GLB, | Performed by: INTERNAL MEDICINE

## 2023-01-24 PROCEDURE — 99397 PER PM REEVAL EST PAT 65+ YR: CPT | Mod: HCNC,S$GLB,, | Performed by: INTERNAL MEDICINE

## 2023-01-24 PROCEDURE — 99999 PR PBB SHADOW E&M-EST. PATIENT-LVL III: ICD-10-PCS | Mod: PBBFAC,HCNC,, | Performed by: INTERNAL MEDICINE

## 2023-01-24 PROCEDURE — 1159F MED LIST DOCD IN RCRD: CPT | Mod: HCNC,CPTII,S$GLB, | Performed by: INTERNAL MEDICINE

## 2023-01-24 PROCEDURE — 1125F PR PAIN SEVERITY QUANTIFIED, PAIN PRESENT: ICD-10-PCS | Mod: HCNC,CPTII,S$GLB, | Performed by: INTERNAL MEDICINE

## 2023-01-24 RX ORDER — AMLODIPINE BESYLATE 5 MG/1
5 TABLET ORAL DAILY
Qty: 90 TABLET | Refills: 4 | Status: SHIPPED | OUTPATIENT
Start: 2023-01-24 | End: 2024-03-01

## 2023-01-24 NOTE — PROGRESS NOTES
"Subjective:       Patient ID: Delphine Perdomo is a 77 y.o. female.    Chief Complaint: Annual Exam  This is a 77-year-old who presents today for physical.  She reports in general she has been doing well she and her  continue to travel and see her children and grandchildren which she has enjoyed although she reports more recently they had been doing more traveling she had been exercising as much as she was but hopes to get back into it now that she is home .  She did do some walking and reports that she strained her groin the other day it does seem to be getting better.  she continues to have tremors with her Parkinson's but no falls and she continues to follow with her neurologist.  She did have her cataract surgery since last visit and her vision has improved.     HPI  Review of Systems   Cardiovascular:  Negative for chest pain and leg swelling.   Neurological:         Tremors no falls    Groin strain improving     Objective:    Blood pressure 120/74, pulse 82, height 5' 2" (1.575 m), weight 75.2 kg (165 lb 12.6 oz), SpO2 98 %.   Physical Exam  Constitutional:       General: She is not in acute distress.  HENT:      Head: Normocephalic.      Mouth/Throat:      Pharynx: Oropharynx is clear.   Eyes:      General: No scleral icterus.  Cardiovascular:      Rate and Rhythm: Normal rate and regular rhythm.      Heart sounds: Normal heart sounds. No murmur heard.    No friction rub. No gallop.      Comments: Breast : normal no masses or tenderness      Pulmonary:      Effort: Pulmonary effort is normal. No respiratory distress.      Breath sounds: Normal breath sounds.   Abdominal:      General: Bowel sounds are normal.      Palpations: Abdomen is soft. There is no mass.      Tenderness: There is no abdominal tenderness.   Musculoskeletal:      Cervical back: Neck supple.   Skin:     Findings: No erythema.   Neurological:      Mental Status: She is alert.      Comments: Tremor    Psychiatric:         Mood and " Affect: Mood normal.       Assessment:       1. Annual physical exam    2. Encounter for screening mammogram for breast cancer    3. PD (Parkinson's disease)    4. Primary hypertension    5. Spinal stenosis in cervical region          Plan:       Delphine Simms was seen today for annual exam.    Diagnoses and all orders for this visit:    Annual physical exam    Encounter for screening mammogram for breast cancer  Order placed for her to schedule her annual  -     Mammo Digital Screening Bilat w/ [facility]; Future    PD (Parkinson's disease)  History of she is been relatively stable continue fall precautions she has mild tremors and is cautious with her gait she has followed with her neurologist regularly and remains on Sinemet    Primary hypertension  Blood pressure controlled continue current regimen    Spinal stenosis in cervical region  History of had prior prior cervical fusion    Other orders  -     amLODIPine (NORVASC) 5 MG tablet; Take 1 tablet (5 mg total) by mouth once daily.  Continue current regimen    Resume exercise program discussed shingles vaccine discussed

## 2023-02-09 DIAGNOSIS — Z00.00 ENCOUNTER FOR MEDICARE ANNUAL WELLNESS EXAM: ICD-10-CM

## 2023-02-14 ENCOUNTER — OFFICE VISIT (OUTPATIENT)
Dept: NEUROLOGY | Facility: CLINIC | Age: 78
End: 2023-02-14
Payer: MEDICARE

## 2023-02-14 VITALS
DIASTOLIC BLOOD PRESSURE: 81 MMHG | BODY MASS INDEX: 30.36 KG/M2 | SYSTOLIC BLOOD PRESSURE: 126 MMHG | WEIGHT: 165 LBS | HEART RATE: 73 BPM | HEIGHT: 62 IN

## 2023-02-14 DIAGNOSIS — G20.A1 PD (PARKINSON'S DISEASE): Primary | ICD-10-CM

## 2023-02-14 DIAGNOSIS — Z71.89 COUNSELING REGARDING GOALS OF CARE: ICD-10-CM

## 2023-02-14 PROCEDURE — 99999 PR PBB SHADOW E&M-EST. PATIENT-LVL III: CPT | Mod: PBBFAC,HCNC,, | Performed by: PHYSICIAN ASSISTANT

## 2023-02-14 PROCEDURE — 1126F AMNT PAIN NOTED NONE PRSNT: CPT | Mod: HCNC,CPTII,S$GLB, | Performed by: PHYSICIAN ASSISTANT

## 2023-02-14 PROCEDURE — 3079F PR MOST RECENT DIASTOLIC BLOOD PRESSURE 80-89 MM HG: ICD-10-PCS | Mod: HCNC,CPTII,S$GLB, | Performed by: PHYSICIAN ASSISTANT

## 2023-02-14 PROCEDURE — 3288F FALL RISK ASSESSMENT DOCD: CPT | Mod: HCNC,CPTII,S$GLB, | Performed by: PHYSICIAN ASSISTANT

## 2023-02-14 PROCEDURE — 3074F PR MOST RECENT SYSTOLIC BLOOD PRESSURE < 130 MM HG: ICD-10-PCS | Mod: HCNC,CPTII,S$GLB, | Performed by: PHYSICIAN ASSISTANT

## 2023-02-14 PROCEDURE — 1126F PR PAIN SEVERITY QUANTIFIED, NO PAIN PRESENT: ICD-10-PCS | Mod: HCNC,CPTII,S$GLB, | Performed by: PHYSICIAN ASSISTANT

## 2023-02-14 PROCEDURE — 3288F PR FALLS RISK ASSESSMENT DOCUMENTED: ICD-10-PCS | Mod: HCNC,CPTII,S$GLB, | Performed by: PHYSICIAN ASSISTANT

## 2023-02-14 PROCEDURE — 3074F SYST BP LT 130 MM HG: CPT | Mod: HCNC,CPTII,S$GLB, | Performed by: PHYSICIAN ASSISTANT

## 2023-02-14 PROCEDURE — 1101F PT FALLS ASSESS-DOCD LE1/YR: CPT | Mod: HCNC,CPTII,S$GLB, | Performed by: PHYSICIAN ASSISTANT

## 2023-02-14 PROCEDURE — 1101F PR PT FALLS ASSESS DOC 0-1 FALLS W/OUT INJ PAST YR: ICD-10-PCS | Mod: HCNC,CPTII,S$GLB, | Performed by: PHYSICIAN ASSISTANT

## 2023-02-14 PROCEDURE — 1159F MED LIST DOCD IN RCRD: CPT | Mod: HCNC,CPTII,S$GLB, | Performed by: PHYSICIAN ASSISTANT

## 2023-02-14 PROCEDURE — 3079F DIAST BP 80-89 MM HG: CPT | Mod: HCNC,CPTII,S$GLB, | Performed by: PHYSICIAN ASSISTANT

## 2023-02-14 PROCEDURE — 99213 PR OFFICE/OUTPT VISIT, EST, LEVL III, 20-29 MIN: ICD-10-PCS | Mod: HCNC,S$GLB,, | Performed by: PHYSICIAN ASSISTANT

## 2023-02-14 PROCEDURE — 1160F PR REVIEW ALL MEDS BY PRESCRIBER/CLIN PHARMACIST DOCUMENTED: ICD-10-PCS | Mod: HCNC,CPTII,S$GLB, | Performed by: PHYSICIAN ASSISTANT

## 2023-02-14 PROCEDURE — 1160F RVW MEDS BY RX/DR IN RCRD: CPT | Mod: HCNC,CPTII,S$GLB, | Performed by: PHYSICIAN ASSISTANT

## 2023-02-14 PROCEDURE — 99213 OFFICE O/P EST LOW 20 MIN: CPT | Mod: HCNC,S$GLB,, | Performed by: PHYSICIAN ASSISTANT

## 2023-02-14 PROCEDURE — 99999 PR PBB SHADOW E&M-EST. PATIENT-LVL III: ICD-10-PCS | Mod: PBBFAC,HCNC,, | Performed by: PHYSICIAN ASSISTANT

## 2023-02-14 PROCEDURE — 1159F PR MEDICATION LIST DOCUMENTED IN MEDICAL RECORD: ICD-10-PCS | Mod: HCNC,CPTII,S$GLB, | Performed by: PHYSICIAN ASSISTANT

## 2023-05-05 ENCOUNTER — PES CALL (OUTPATIENT)
Dept: ADMINISTRATIVE | Facility: CLINIC | Age: 78
End: 2023-05-05
Payer: MEDICARE

## 2023-05-07 ENCOUNTER — HOSPITAL ENCOUNTER (EMERGENCY)
Facility: HOSPITAL | Age: 78
Discharge: HOME | End: 2023-05-07
Attending: EMERGENCY MEDICINE
Payer: MEDICARE

## 2023-05-07 VITALS
SYSTOLIC BLOOD PRESSURE: 120 MMHG | TEMPERATURE: 98 F | OXYGEN SATURATION: 98 % | HEART RATE: 70 BPM | DIASTOLIC BLOOD PRESSURE: 60 MMHG | RESPIRATION RATE: 16 BRPM

## 2023-05-07 DIAGNOSIS — M48.061 SPINAL STENOSIS OF LUMBAR REGION, UNSPECIFIED WHETHER NEUROGENIC CLAUDICATION PRESENT: Primary | ICD-10-CM

## 2023-05-07 DIAGNOSIS — R10.30 GROIN PAIN: ICD-10-CM

## 2023-05-07 DIAGNOSIS — M16.0 OSTEOARTHRITIS OF BOTH HIPS, UNSPECIFIED OSTEOARTHRITIS TYPE: ICD-10-CM

## 2023-05-07 PROCEDURE — 99284 PR EMERGENCY DEPT VISIT,LEVEL IV: ICD-10-PCS | Mod: ,,, | Performed by: EMERGENCY MEDICINE

## 2023-05-07 PROCEDURE — 25000003 PHARM REV CODE 250: Performed by: PHYSICIAN ASSISTANT

## 2023-05-07 PROCEDURE — 99284 EMERGENCY DEPT VISIT MOD MDM: CPT | Mod: ,,, | Performed by: EMERGENCY MEDICINE

## 2023-05-07 PROCEDURE — 99284 EMERGENCY DEPT VISIT MOD MDM: CPT | Mod: 25

## 2023-05-07 RX ORDER — METHYLPREDNISOLONE 4 MG/1
TABLET ORAL
Qty: 21 EACH | Refills: 0 | Status: SHIPPED | OUTPATIENT
Start: 2023-05-07 | End: 2023-05-07 | Stop reason: SDUPTHER

## 2023-05-07 RX ORDER — ACETAMINOPHEN 500 MG
1000 TABLET ORAL
Status: COMPLETED | OUTPATIENT
Start: 2023-05-07 | End: 2023-05-07

## 2023-05-07 RX ORDER — METHYLPREDNISOLONE 4 MG/1
TABLET ORAL
Qty: 21 EACH | Refills: 0 | Status: SHIPPED | OUTPATIENT
Start: 2023-05-07 | End: 2023-05-28

## 2023-05-07 RX ORDER — LIDOCAINE 50 MG/G
1 PATCH TOPICAL DAILY
Qty: 15 PATCH | Refills: 2 | Status: SHIPPED | OUTPATIENT
Start: 2023-05-07 | End: 2023-05-07 | Stop reason: SDUPTHER

## 2023-05-07 RX ORDER — LIDOCAINE 50 MG/G
1 PATCH TOPICAL DAILY
Qty: 15 PATCH | Refills: 2 | Status: SHIPPED | OUTPATIENT
Start: 2023-05-07

## 2023-05-07 RX ORDER — IBUPROFEN 600 MG/1
600 TABLET ORAL
Status: COMPLETED | OUTPATIENT
Start: 2023-05-07 | End: 2023-05-07

## 2023-05-07 RX ADMIN — IBUPROFEN 600 MG: 600 TABLET, FILM COATED ORAL at 03:05

## 2023-05-07 RX ADMIN — ACETAMINOPHEN 1000 MG: 500 TABLET ORAL at 03:05

## 2023-05-07 NOTE — DISCHARGE INSTRUCTIONS
Diagnosis:  Spinal stenosis, osteoarthritis of the hips    Your x-ray showed arthritis in both of your hips.  Your MRI showed spinal stenosis as well as some disc bulge.  The report is attached.  I think this could be contributing to your symptoms.  I am prescribing a course of steroids to help decrease inflammation around the nerve root as well as numbing patches that you can use as needed. You should take Tylenol as needed for pain up to 3 grams daily which is 6 of the 500 mg extra strength tablets as well as ibuprofen, up to 2400 mg daily as needed.    I sent referrals to Dr. Figueroa as well as to [facility].  Please call to schedule follow-up appointments.  If you start to have any worsening symptoms, please return to the emergency department.

## 2023-05-07 NOTE — ED PROVIDER NOTES
Encounter Date: 5/7/2023       History     Chief Complaint   Patient presents with    Back Pain     Couldn't stand up fully after bending over today, been having groin pain x3 months      77-year-old female with history of cervical degenerative disc disease with myelopathy s/p cervical fusion 2011, hip dislocation, Parkinson's disease presents for left groin pain for almost 5 months as well as severe low back pain for 1 day. She reports pain in her left groin which she initially attributed to a muscle strain but has been persistent for several months now.  Exacerbated by exercise and improved somewhat by rest.  She was previously told that lumbar spinal issues in addition to her cervical issues and notes that she frequently hears crackling in her low spine when lying on her back doing exercises.  However, today after bending over she felt sudden, severe pain in her low back which prompted her come to the ED for evaluation.  Her left leg feels occasionally weak she denies numbness, saddle anesthesia, bowel or bladder dysfunction, abdominal pain, chest pain or other complaint.    Review of patient's allergies indicates:   Allergen Reactions    Codeine      Other reaction(s): Unknown    Morphine      Other reaction(s): Unknown    Sulfa (sulfonamide antibiotics)      Other reaction(s): Rash     Past Medical History:   Diagnosis Date    Degenerative disc disease     sp cervical fusion,     Dislocation     hx of ant dislocation of hip prior     Glaucoma     Lichen sclerosus     Urge incontinence      Past Surgical History:   Procedure Laterality Date    APPENDECTOMY      BURSECTOMY OF ELBOW Right 3/14/2022    Procedure: BURSECTOMY, ELBOW,RIGHT;  Surgeon: Kathryn Jackson MD;  Location: [facility];  Service: Orthopedics;  Laterality: Right;  MAC/REGIONAL    CHOLECYSTECTOMY      EYE SURGERY      cataract surgery     HYSTERECTOMY      supracervical/ retained cervix     OOPHORECTOMY Bilateral     SPINE SURGERY      cervical  fusion/plate and discectomy     Family History   Problem Relation Age of Onset    Hypertension Mother     Hypoglycemic Mother     Cancer Father         prostate cancer     Hypertension Sister     Diabetes Brother      Social History     Tobacco Use    Smoking status: Never    Smokeless tobacco: Never   Substance Use Topics    Alcohol use: Yes     Comment: socially    Drug use: No     Review of Systems   Constitutional:  Negative for fever.   Respiratory:  Negative for shortness of breath.    Cardiovascular:  Negative for chest pain.   Gastrointestinal:  Negative for abdominal pain.   Musculoskeletal:  Positive for back pain, myalgias and neck stiffness.   Allergic/Immunologic: Negative for immunocompromised state.   Neurological:  Positive for weakness. Negative for numbness.     Physical Exam     Initial Vitals [05/07/23 1213]   BP Pulse Resp Temp SpO2   (!) 153/66 89 16 98.1 °F (36.7 °C) 96 %      MAP       --         Physical Exam    Nursing note and vitals reviewed.  Constitutional: She appears well-developed and well-nourished.   HENT:   Head: Normocephalic and atraumatic.   Neck: Neck supple.   Normal range of motion.  Cardiovascular:  Normal rate, regular rhythm, normal heart sounds and intact distal pulses.     Exam reveals no gallop and no friction rub.       No murmur heard.  Pulmonary/Chest: Breath sounds normal. No respiratory distress. She has no wheezes. She has no rhonchi. She has no rales. She exhibits no tenderness.   Musculoskeletal:         General: Normal range of motion.      Cervical back: Normal range of motion and neck supple.      Right hip: Normal.      Left hip: Normal.      Right upper leg: Normal.      Left upper leg: Normal.      Right knee: Normal.      Left knee: Normal.      Comments: No midline tenderness to palpation of C, T or L-spine.  There is some paraspinous tenderness bilaterally     Neurological: She is alert and oriented to person, place, and time. She has normal strength.  No sensory deficit.   Resting tremor   Skin: Skin is warm and dry.   Psychiatric: She has a normal mood and affect.       ED Course   Procedures  Labs Reviewed - No data to display       Imaging Results              MRI Lumbar Spine Without Contrast (Final result)  Result time 05/07/23 16:18:18      Final result by Liu Ramirez MD (05/07/23 16:18:18)                   Impression:      Multilevel lumbar spondylosis most pronounced from L2-L3 through L4-L5 as detailed above.      Electronically signed by: Liu Ramirez MD  Date:    05/07/2023  Time:    16:18               Narrative:    EXAMINATION:  MRI LUMBAR SPINE WITHOUT CONTRAST    CLINICAL HISTORY:  Low back pain, symptoms persist with > 6wks conservative treatment;Lumbar radiculopathy, symptoms persist with conservative treatment;    TECHNIQUE:  Multiplanar, multisequence MR images were acquired from the thoracolumbar junction to the sacrum without the administration of contrast.    COMPARISON:  CT 05/07/2023.    FINDINGS:  Lumbar spine alignment demonstrates grade 1 anterolisthesis of L3 on L4.  No spondylolysis.  Vertebral body heights are well maintained without evidence for fracture.  No marrow signal abnormality to suggest an infiltrative process.    Multilevel degenerative disc desiccation with mild associated height loss from L1-L2 through L3-L4.  Mild subendplate edema at L2-L3 annular fissure at L4-L5.    Distal spinal cord demonstrates normal contour and signal intensity.  Cauda equina appears normal without findings to suggest arachnoiditis.  Conus medullaris terminates at L1.    Right renal cortical cyst.  Remaining visualized abdominal and pelvic structures demonstrate no significant abnormalities.  SI joints are symmetric.  Paraspinal musculature demonstrates normal bulk and signal intensity.    T12-L1: No spinal canal stenosis.  No neural foraminal narrowing.    L1-L2: Circumferential disc bulge.  Bilateral facet arthropathy and bilateral  ligamentum flavum hypertrophy.  No spinal canal stenosis.  Mild left neural foraminal narrowing.    L2-L3: Circumferential disc bulge.  Bilateral facet arthropathy and bilateral ligamentum flavum hypertrophy.  Prominent posterior epidural fat.  Mild spinal canal stenosis.  Mild bilateral neural foraminal narrowing.    L3-L4: Grade 1 anterolisthesis with uncovering the intervertebral disc.  Circumferential disc bulge.  Bilateral facet arthropathy and bilateral ligamentum flavum hypertrophy.  Moderate to severe spinal canal and lateral recess stenosis.  Mild bilateral neural foraminal narrowing.    L4-L5: Circumferential disc bulge.  Bilateral facet arthropathy and bilateral ligamentum flavum hypertrophy.  Mild-to-moderate spinal canal and lateral recess stenosis.  Mild left neural foraminal narrowing.    L5-S1: Posterior broad-based disc bulge with a left lateral bridging osteophyte that closely approximates the left exiting L5 nerve root.  Bilateral facet arthropathy.  Mild left lateral recess stenosis.  Mild left neural foraminal narrowing.                                       X-Ray Hip 2 or 3 views Left (with Pelvis when performed) (Final result)  Result time 05/07/23 14:51:19      Final result by Harpreet Toribio MD (05/07/23 14:51:19)                   Impression:      No convincing evidence of acute displaced fracture or dislocation.      Electronically signed by: Harpreet Toribio  Date:    05/07/2023  Time:    14:51               Narrative:    EXAMINATION:  XR HIP WITH PELVIS WHEN PERFORMED, 2 OR 3 VIEWS LEFT    CLINICAL HISTORY:  Lower abdominal pain, unspecified    TECHNIQUE:  AP view of the pelvis and frog leg lateral view of the left hip were performed.    COMPARISON:  None    FINDINGS:  Examination limited by overlying bowel gas and stool, somewhat suboptimal patient positioning, and questioned osseous demineralization.    Noting this, no convincing evidence of acute displaced fracture or dislocation.   Degenerative findings are noted involving the spine, sacroiliac joints, pubic symphysis, and hip joints.    Phleboliths project within the pelvis.                                       CT Lumbar Spine Without Contrast (Final result)  Result time 05/07/23 14:48:51      Final result by Harpreet Toribio MD (05/07/23 14:48:51)                   Impression:      1. No evidence of acute lumbar spine fracture.  2. Degenerative findings, as discussed.  3. Of note, at the L3-4 level, there is suggested severe central spinal canal stenosis, progressed when compared to remote MRI performed 04/19/2011.  4. Additional details, as provided in the body of report.      Electronically signed by: Harpreet Toribio  Date:    05/07/2023  Time:    14:48               Narrative:    EXAMINATION:  CT LUMBAR SPINE WITHOUT CONTRAST    CLINICAL HISTORY:  Low back pain, increased fracture risk;Compression fracture, lumbar;    TECHNIQUE:  Low-dose axial, sagittal and coronal reformations are obtained through the lumbar spine.  Contrast was not administered.    COMPARISON:  MRI of the lumbar spine performed 04/19/2011.    FINDINGS:    Fractures: No acute lumbar spine fracture is present.    Alignment: Grade 1 anterolisthesis of L3 on L4.    Discs: Degenerative disc disease.    Vertebral bodies: Degenerative endplate change.    Paraspinal soft tissues: Unremarkable.    Although CT is inferior to MRI in the evaluation of spinal canal and foraminal soft tissues, level-wise findings are as follows:    L1-L2: There is no significant spinal canal or foraminal stenosis. Shallow diffuse disc bulge and mild facet arthropathy with ligamentum flavum thickening.    L2-L3: Diffuse disc bulge and facet arthropathy with ligamentum flavum thickening contributes to mild central spinal canal stenosis and mild bilateral foraminal narrowing.    L3-L4: Grade 1 anterolisthesis of L3 on L4 results in uncovering of the diffusely bulging disc, which in conjunction with  advanced facet arthropathy and ligamentum flavum thickening appears contribute to severe central spinal canal stenosis.  Narrowing of the subarticular recesses would additionally be suspected with possible tension of traversing L4 nerve roots, not discretely visualized by current CT technique.  Mild bilateral foraminal narrowing.    L4-L5: Diffuse disc bulge and advanced facet arthropathy with ligamentum flavum thickening contribute to overall mild-to-moderate central spinal canal stenosis and mild right and mild-to-moderate left foraminal narrowing.    L5-S1: Minimal diffuse disc bulge and moderate facet arthropathy with ligamentum flavum thickening.  No significant central spinal canal stenosis.  Mild left and minimal right foraminal narrowing.    Imaged sacroiliac joints: Degenerative changes.    Imaged abdomen, pelvis, and retroperitoneum: Aortoiliac atherosclerotic calcification.  Colonic diverticulosis.                                       Medications   acetaminophen tablet 1,000 mg (1,000 mg Oral Given 5/7/23 1503)   ibuprofen tablet 600 mg (600 mg Oral Given 5/7/23 1503)     Medical Decision Making:   History:   Old Medical Records: I decided to obtain old medical records.  Old Records Summarized: records from clinic visits.       <> Summary of Records: No recent ED visits or admissions  Initial Assessment:   77-year-old female presenting for severe back pain as well as several months of left groin pain.  Hypertensive at 153/66 with otherwise normal vitals.  Neurovascularly intact.  Differential Diagnosis:   Arthritis  Compression fracture   Myelopathy felt somewhat less likely  No red flags for cauda equina syndrome  Clinical Tests:   Radiological Study: Ordered and Reviewed  ED Management:  Will do x-rays of the hip, CT lumbar spine and reassess.  Patient offered analgesics but she declines.    X-ray shows some degenerative changes but no acute fracture.  CT shows severe spinal canal stenosis.  Will  obtain MRI to further characterize and evaluate for cord compression.    MRI shows multilevel spondylosis with disc bulge at multiple different levels.  I think this might be contributing to her symptoms.  Patient has an upcoming appointment with a spine surgeon end of June but is interested in getting an earlier appointment if possible.  Will send referrals to back and [facility], spine surgery and instruct her to return to the ED for any worsening symptoms. Stressed the importance of follow-up, strict ED return precautions given.  Patient voiced understanding and is comfortable with discharge.            ED Course as of 05/07/23 1929   Sun May 07, 2023   3085 CT Lumbar Spine Without Contrast  CT shows severe central spinal cord stenosis as well as disc bulge at L2-L3 level.  I discussed this finding with the patient.  I do not think her presentation is consistent with acute cauda equinus syndrome, however given the severity and location of her pain, concern for myelopathy.  Engaged in shared decision-making with patient and family regarding MRI in the ED versus outpatient.  She would prefer to have it performed today.  Will order. [CC]      ED Course User Index  [CC] Jennifer Harrell PA-C                 Clinical Impression:   Final diagnoses:  [R10.30] Groin pain  [M48.061] Spinal stenosis of lumbar region, unspecified whether neurogenic claudication present (Primary)  [M16.0] Osteoarthritis of both hips, unspecified osteoarthritis type        ED Disposition Condition    Discharge Stable          ED Prescriptions       Medication Sig Dispense Start Date End Date Auth. Provider    methylPREDNISolone (MEDROL DOSEPACK) 4 mg tablet  (Status: Discontinued) use as directed 21 each 5/7/2023 5/7/2023 Jennifer Harrell PA-C    LIDOcaine (LIDODERM) 5 %  (Status: Discontinued) Place 1 patch onto the skin once daily. Remove & Discard patch within 12 hours or as directed by MD 15 patch 5/7/2023 5/7/2023 Jennifer  PERCY Harrell    LIDOcaine (LIDODERM) 5 % Place 1 patch onto the skin once daily. Remove & Discard patch within 12 hours or as directed by MD 15 patch 5/7/2023 -- Jennifer Harrell PA-C    methylPREDNISolone (MEDROL DOSEPACK) 4 mg tablet use as directed 21 each 5/7/2023 5/28/2023 Jennifer Harrell PA-C          Follow-up Information       Follow up With Specialties Details Why Contact Info Additional Information    Austyn Figueroa MD Orthopedic Surgery, Spine Surgery Schedule an appointment as soon as possible for a visit in 1 week  [address] [facility] [address]  [phone]       [facility] - Back [facility] Sp[facility] Schedule an appointment as soon as possible for a visit in 1 week  [address], [address]  [facility] [id number] [phone] [facility] - [facility], 4th Floor Please park in [place] [place] and use [place] elevators    [facility] - Emergency Dept Emergency Medicine Go to  If symptoms worsen [address] [facility] [id number] [phone]              Jennifer Harrell PA-C  05/07/23 1929

## 2023-05-07 NOTE — ED NOTES
LOC: The patient is awake and alert; oriented x 3 and speaking appropriately.  APPEARANCE: Patient resting uncomfortably, patient is clean and well groomed  SKIN: warm and dry, normal skin turgor & moist mucus membranes, skin intact, no breakdown noted.  MUSCULOSKELETAL: Patient moving all extremities well, no obvious swelling or deformities noted, c/o pain in lower back   RESPIRATORY: Airway is open and patent, ; respirations are spontaneous, normal effort and rate  CARDIAC: Patient has a normal rate, no peripheral edema noted, capillary refill < 3 seconds; No complaints of chest pain   ABDOMEN: Soft and non tender to palpation, no distention noted.

## 2023-05-07 NOTE — ED TRIAGE NOTES
"Bent over to reach a bottom drawer and experienced pain across lower back making it difficulty to stand up. Feels a crunching in lower back while doing  floor exercises. Has a "cage" in her cervical spine. Has numbness/ tingling intermittently in left leg and groin pain since an injury in January 2023. Denies incontinence  "

## 2023-05-08 ENCOUNTER — TELEPHONE (OUTPATIENT)
Dept: INTERNAL MEDICINE | Facility: CLINIC | Age: 78
End: 2023-05-08
Payer: MEDICARE

## 2023-05-08 NOTE — TELEPHONE ENCOUNTER
----- Message from Mami Gibson sent at 5/8/2023 10:14 AM CDT -----  Contact: [phone]  Patient spouse called, requested a courtesy call from Dr Figueroa in regards patient upcoming appointment with Dr Austyn Figueroa MD. Please call and advise. Thank you

## 2023-05-12 ENCOUNTER — OFFICE VISIT (OUTPATIENT)
Dept: ORTHOPEDICS | Facility: CLINIC | Age: 78
End: 2023-05-12
Payer: MEDICARE

## 2023-05-12 ENCOUNTER — HOSPITAL ENCOUNTER (OUTPATIENT)
Dept: RADIOLOGY | Facility: HOSPITAL | Age: 78
Discharge: HOME | End: 2023-05-12
Attending: ORTHOPAEDIC SURGERY
Payer: MEDICARE

## 2023-05-12 VITALS — BODY MASS INDEX: 30.63 KG/M2 | HEIGHT: 62 IN | WEIGHT: 166.44 LBS

## 2023-05-12 DIAGNOSIS — M51.36 DDD (DEGENERATIVE DISC DISEASE), LUMBAR: ICD-10-CM

## 2023-05-12 DIAGNOSIS — M25.552 HIP PAIN, ACUTE, LEFT: Primary | ICD-10-CM

## 2023-05-12 PROCEDURE — 72110 X-RAY EXAM L-2 SPINE 4/>VWS: CPT | Mod: 26,,, | Performed by: RADIOLOGY

## 2023-05-12 PROCEDURE — 1125F AMNT PAIN NOTED PAIN PRSNT: CPT | Mod: CPTII,S$GLB,, | Performed by: ORTHOPAEDIC SURGERY

## 2023-05-12 PROCEDURE — 99999 PR PBB SHADOW E&M-EST. PATIENT-LVL III: ICD-10-PCS | Mod: PBBFAC,,, | Performed by: ORTHOPAEDIC SURGERY

## 2023-05-12 PROCEDURE — 1159F MED LIST DOCD IN RCRD: CPT | Mod: CPTII,S$GLB,, | Performed by: ORTHOPAEDIC SURGERY

## 2023-05-12 PROCEDURE — 99999 PR PBB SHADOW E&M-EST. PATIENT-LVL III: CPT | Mod: PBBFAC,,, | Performed by: ORTHOPAEDIC SURGERY

## 2023-05-12 PROCEDURE — 72110 XR LUMBAR SPINE AP AND LAT WITH FLEX/EXT: ICD-10-PCS | Mod: 26,,, | Performed by: RADIOLOGY

## 2023-05-12 PROCEDURE — 72110 X-RAY EXAM L-2 SPINE 4/>VWS: CPT | Mod: TC

## 2023-05-12 PROCEDURE — 99213 PR OFFICE/OUTPT VISIT, EST, LEVL III, 20-29 MIN: ICD-10-PCS | Mod: S$GLB,,, | Performed by: ORTHOPAEDIC SURGERY

## 2023-05-12 PROCEDURE — 99213 OFFICE O/P EST LOW 20 MIN: CPT | Mod: S$GLB,,, | Performed by: ORTHOPAEDIC SURGERY

## 2023-05-12 PROCEDURE — 3288F FALL RISK ASSESSMENT DOCD: CPT | Mod: CPTII,S$GLB,, | Performed by: ORTHOPAEDIC SURGERY

## 2023-05-12 PROCEDURE — 3288F PR FALLS RISK ASSESSMENT DOCUMENTED: ICD-10-PCS | Mod: CPTII,S$GLB,, | Performed by: ORTHOPAEDIC SURGERY

## 2023-05-12 PROCEDURE — 1125F PR PAIN SEVERITY QUANTIFIED, PAIN PRESENT: ICD-10-PCS | Mod: CPTII,S$GLB,, | Performed by: ORTHOPAEDIC SURGERY

## 2023-05-12 PROCEDURE — 1101F PT FALLS ASSESS-DOCD LE1/YR: CPT | Mod: CPTII,S$GLB,, | Performed by: ORTHOPAEDIC SURGERY

## 2023-05-12 PROCEDURE — 1101F PR PT FALLS ASSESS DOC 0-1 FALLS W/OUT INJ PAST YR: ICD-10-PCS | Mod: CPTII,S$GLB,, | Performed by: ORTHOPAEDIC SURGERY

## 2023-05-12 PROCEDURE — 1159F PR MEDICATION LIST DOCUMENTED IN MEDICAL RECORD: ICD-10-PCS | Mod: CPTII,S$GLB,, | Performed by: ORTHOPAEDIC SURGERY

## 2023-05-13 ENCOUNTER — HOSPITAL ENCOUNTER (OUTPATIENT)
Dept: RADIOLOGY | Facility: HOSPITAL | Age: 78
Discharge: HOME | End: 2023-05-13
Attending: ORTHOPAEDIC SURGERY
Payer: MEDICARE

## 2023-05-13 DIAGNOSIS — M25.552 HIP PAIN, ACUTE, LEFT: ICD-10-CM

## 2023-05-13 PROCEDURE — 72195 MRI PELVIS WITHOUT CONTRAST: ICD-10-PCS | Mod: 26,,, | Performed by: RADIOLOGY

## 2023-05-13 PROCEDURE — 72195 MRI PELVIS W/O DYE: CPT | Mod: TC

## 2023-05-13 PROCEDURE — 72195 MRI PELVIS W/O DYE: CPT | Mod: 26,,, | Performed by: RADIOLOGY

## 2023-05-17 ENCOUNTER — OFFICE VISIT (OUTPATIENT)
Dept: ORTHOPEDICS | Facility: CLINIC | Age: 78
End: 2023-05-17
Payer: MEDICARE

## 2023-05-17 VITALS — HEIGHT: 62 IN | BODY MASS INDEX: 30.59 KG/M2 | WEIGHT: 166.25 LBS

## 2023-05-17 DIAGNOSIS — M47.816 LUMBAR SPONDYLOSIS: Primary | ICD-10-CM

## 2023-05-17 DIAGNOSIS — M43.10 SPONDYLOLISTHESIS, ACQUIRED: ICD-10-CM

## 2023-05-17 PROCEDURE — 1159F MED LIST DOCD IN RCRD: CPT | Mod: CPTII,,, | Performed by: ORTHOPAEDIC SURGERY

## 2023-05-17 PROCEDURE — 99214 PR OFFICE/OUTPT VISIT, EST, LEVL IV, 30-39 MIN: ICD-10-PCS | Mod: ,,, | Performed by: ORTHOPAEDIC SURGERY

## 2023-05-17 PROCEDURE — 99999 PR PBB SHADOW E&M-EST. PATIENT-LVL III: CPT | Mod: PBBFAC,,, | Performed by: ORTHOPAEDIC SURGERY

## 2023-05-17 PROCEDURE — 99999 PR PBB SHADOW E&M-EST. PATIENT-LVL III: ICD-10-PCS | Mod: PBBFAC,,, | Performed by: ORTHOPAEDIC SURGERY

## 2023-05-17 PROCEDURE — 1125F PR PAIN SEVERITY QUANTIFIED, PAIN PRESENT: ICD-10-PCS | Mod: CPTII,,, | Performed by: ORTHOPAEDIC SURGERY

## 2023-05-17 PROCEDURE — 1125F AMNT PAIN NOTED PAIN PRSNT: CPT | Mod: CPTII,,, | Performed by: ORTHOPAEDIC SURGERY

## 2023-05-17 PROCEDURE — 99213 OFFICE O/P EST LOW 20 MIN: CPT | Performed by: ORTHOPAEDIC SURGERY

## 2023-05-17 PROCEDURE — 99214 OFFICE O/P EST MOD 30 MIN: CPT | Mod: ,,, | Performed by: ORTHOPAEDIC SURGERY

## 2023-05-17 PROCEDURE — 1159F PR MEDICATION LIST DOCUMENTED IN MEDICAL RECORD: ICD-10-PCS | Mod: CPTII,,, | Performed by: ORTHOPAEDIC SURGERY

## 2023-05-17 RX ORDER — MELOXICAM 15 MG/1
15 TABLET ORAL DAILY
Qty: 30 TABLET | Refills: 0 | Status: SHIPPED | OUTPATIENT
Start: 2023-05-17 | End: 2023-06-13

## 2023-05-17 NOTE — PROGRESS NOTES
DATE: 5/17/2023  PATIENT: Delphine Perdomo    Attending Physician: Austyn Figueroa M.D.    CHIEF COMPLAINT:  Left groin pain    HISTORY:  Delphine Perdomo is a 77 y.o. female who presents for evaluation of severe left groin pain.  This began in January of 2023 while she was putting on her pants.  She is never had anything like this before.  The patient reports that over the past week her pain has become increasingly bothersome and she is noted some associated low back and left lower extremity pain.  Overall she reports her groin pain is perhaps a little bit better.  The patient is using a cane in her right hand.  She is not taken any steroids because of glaucoma.  The Patient denies myelopathic symptoms such as handwriting changes or difficulty with buttons/coins/keys. Denies perineal paresthesias, bowel/bladder dysfunction.    PAST MEDICAL/SURGICAL HISTORY:  Past Medical History:   Diagnosis Date    Degenerative disc disease     sp cervical fusion,     Dislocation     hx of ant dislocation of hip prior     Glaucoma     Lichen sclerosus     Urge incontinence      Past Surgical History:   Procedure Laterality Date    APPENDECTOMY      BURSECTOMY OF ELBOW Right 3/14/2022    Procedure: BURSECTOMY, ELBOW,RIGHT;  Surgeon: Kathryn Jackson MD;  Location: [facility] OR;  Service: Orthopedics;  Laterality: Right;  MAC/REGIONAL    CHOLECYSTECTOMY      EYE SURGERY      cataract surgery     HYSTERECTOMY      supracervical/ retained cervix     OOPHORECTOMY Bilateral     SPINE SURGERY      cervical fusion/plate and discectomy       Current Medications:   Current Outpatient Medications:     acetaminophen (TYLENOL) 500 MG tablet, Take 2 tablets (1,000 mg total) by mouth 2 (two) times daily as needed for Pain., Disp: 40 tablet, Rfl: 0    amLODIPine (NORVASC) 5 MG tablet, Take 1 tablet (5 mg total) by mouth once daily., Disp: 90 tablet, Rfl: 4    ascorbic acid, vitamin C, (VITAMIN C) 100 MG tablet, Take 100 mg by mouth once  "daily., Disp: , Rfl:     carbidopa (LODOSYN) 25 mg tablet, Take 1 tablet (25 mg total) by mouth 3 (three) times daily., Disp: 90 tablet, Rfl: 3    carbidopa-levodopa  mg (SINEMET)  mg per tablet, TAKE 1 TABLET BY MOUTH THREE TIMES A DAY, Disp: 270 tablet, Rfl: 3    latanoprost 0.005 % ophthalmic solution, Place one drop in right eye every night, Disp: , Rfl:     LIDOcaine (LIDODERM) 5 %, Place 1 patch onto the skin once daily. Remove & Discard patch within 12 hours or as directed by MD, Disp: 15 patch, Rfl: 2    methylPREDNISolone (MEDROL DOSEPACK) 4 mg tablet, use as directed, Disp: 21 each, Rfl: 0    multivitamin capsule, Take 1 capsule by mouth once daily., Disp: , Rfl:     Social History:   Social History     Socioeconomic History    Marital status:    Tobacco Use    Smoking status: Never    Smokeless tobacco: Never   Substance and Sexual Activity    Alcohol use: Yes     Comment: socially    Drug use: No        EXAM:  Ht 5' 2" (1.575 m)   Wt 75.5 kg (166 lb 7.2 oz)   BMI 30.44 kg/m²     PHYSICAL EXAMINATION:    Gait: Normal station and gait, no difficulty with toe or heel walk.   Skin: Dorsal lumbar skin negative for rashes, lesions, hairy patches and surgical scars. There is minimal lumbar tenderness to palpation.  Range of motion: Lumbar range of motion is acceptable.  Spinal Balance: Global saggital and coronal spinal balance acceptable, no significant for scoliosis and kyphosis.  Musculoskeletal: No pain with the range of motion of the bilateral hips.  There is moderate left hip pain with internal and external rotation  Vascular: Bilateral lower extremities warm and well perfused, Dorsalis pedis pulses 2+ bilaterally.  Neurological: Normal strength and tone in all major motor groups in the bilateral lower extremities. Normal sensation to light touch in the L2-S1 dermatomes bilaterally.  Deep tendon reflexes symmetric 1+ in the bilateral lower extremities.  Negative Babinski bilaterally. " Straight leg raise negative bilaterally.    IMAGING:      Today I personally reviewed AP, Lat and Flex/Ex  upright L-spine that demonstrate mild multilevel lumbar spondylosis.  There is a trace L3-4 spondylolisthesis.  I was also able to review radiographs of her left hip from May 7th that demonstrate no evidence of hip fracture.  An MRI of the lumbar spine demonstrates an L3-4 spondylolisthesis with moderate to severe L2-3 and L3-4 stenosis.      Body mass index is 30.44 kg/m².  Hemoglobin A1C   Date Value Ref Range Status   07/02/2013 6.1 4.0 - 6.2 % Final       ASSESSMENT/PLAN:    Delphine Simms was seen today for hip pain and leg pain.    Diagnoses and all orders for this visit:    Hip pain, acute, left  -     MRI Pelvis Without Contrast; Future      No follow-ups on file.    I am suspicious about an occult left hip fracture, I will order an MRI of the pelvis to evaluate for this.

## 2023-05-19 NOTE — PROGRESS NOTES
The patient returns for follow-up.  At her last visit I was concerned about an occult left hip fracture given her symptoms of left groin pain.  The patient had a recent MRI.      Overall she reports her left groin plan is actually doing better, and she is more bothered by her left-sided low back pain.  She reports that she is taking anti-inflammatory medications for this.      Today reviewed a recent MRI of the pelvis demonstrates no evidence of occult left hip fracture.      Impression:  Low back pain, L3-4 degenerative spondylolisthesis.      Plan I recommended a course of meloxicam and physical therapy, I will see her back in 2 months if she is still having symptoms.

## 2023-06-05 ENCOUNTER — CLINICAL SUPPORT (OUTPATIENT)
Dept: REHABILITATION | Facility: HOSPITAL | Age: 78
End: 2023-06-05
Attending: ORTHOPAEDIC SURGERY
Payer: MEDICARE

## 2023-06-05 DIAGNOSIS — R29.898 DECREASED STRENGTH OF LOWER EXTREMITY: ICD-10-CM

## 2023-06-05 DIAGNOSIS — M43.10 SPONDYLOLISTHESIS, ACQUIRED: ICD-10-CM

## 2023-06-05 DIAGNOSIS — M47.816 LUMBAR SPONDYLOSIS: ICD-10-CM

## 2023-06-05 PROCEDURE — 97161 PT EVAL LOW COMPLEX 20 MIN: CPT

## 2023-06-05 PROCEDURE — 97112 NEUROMUSCULAR REEDUCATION: CPT

## 2023-06-11 PROBLEM — R29.898 DECREASED STRENGTH OF LOWER EXTREMITY: Status: ACTIVE | Noted: 2023-06-11

## 2023-06-12 NOTE — PLAN OF CARE
[facility]   Physical Therapy Initial Evaluation      Name: Delphine Perdomo  Clinic Number: [id number]    Therapy Diagnosis:   Encounter Diagnoses   Name Primary?    Lumbar spondylosis     Spondylolisthesis, acquired     Decreased strength of lower extremity      Physician: Austyn Figueroa MD     Physician Orders: PT Eval and Treat   Medical Diagnosis from Referral:   M47.816 (ICD-10-CM) - Lumbar spondylosis   M43.10 (ICD-10-CM) - Spondylolisthesis, acquired     Evaluation Date: 6/5/2023  Authorization Period Expiration: 5/16/2024  Plan of Care Expiration: 8/20/2023  Progress Note Due: 7/10/2023  Visit # / Visits authorized: 1/1   FOTO: 1/3    Precautions: Standard and Parkinson's disease      Time In: 1:00 pm  Time Out: 1:50 pm   Total Appointment Time (timed & untimed codes): 50 minutes    Subjective     Date of onset: January 2023    History of current condition - Delphine reports: Starting in January of this year she while she was putting her pants on she jolted herself and hurt her back to where she couldn't walk the next day. Then starting about 2-3 weeks ago she bent over and when she went to stand back up she couldn't fully stand and was in excrutiating pain around a 7/10. Went to the doctor and had some MRIs done and thinks that she tore something in her back. The pain is localized to the posterior left hip and will go into the groin area. The groin discomfort improves with antiinflammatories. She reports that the pain feels like a knife in her back/hip area that goes into the groin and down the leg. She will sometimes get it at night and with stairs. The pain is aggravating her bursitis as well. She was diagnosed with Parkinson's a few years ago and goes to the gym regularly to help with that but is unsure what she can and can't do because of the torn tendon she has. She notices certain pushing/pulling exercises bother it more. The eliptical bothers it as well.   She reports a  past medical history of HTN (1 pill a day) and  Parkinson's     Imaging:   MRI Pelvic (5/13/2023):  Impression:  Findings concerning for left trochanteric bursitis with gluteus medius tendinopathy.  No acute fracture.    Prior Therapy: Yes, not for this   Social History: Lives with  in 2-story home  Occupation: Retired, was a    Prior Level of Function: Mild discomfort every now and then but independent in all activities.   Current Level of Function: Increased difficulty with ADLs and going to the gym secondary to pain.     Pain:  Current 0/10, worst 10/10, best 0/10   Location: left back  and hip    Description: stabbing   Aggravating Factors: stairs, laying on her left side, gym activities  Easing Factors: antiinflammatories     Patients goals: To be able to return to her exercise program she was doing at the gym without pain and understanding what she can and can't to prevent her from injuring herself.      Medical History:   Past Medical History:   Diagnosis Date    Degenerative disc disease     sp cervical fusion,     Dislocation     hx of ant dislocation of hip prior     Glaucoma     Lichen sclerosus     Urge incontinence        Surgical History:   Delphine Perdomo  has a past surgical history that includes Appendectomy; Cholecystectomy; Spine surgery; Eye surgery; Hysterectomy; Oophorectomy (Bilateral); and Bursectomy of elbow (Right, 3/14/2022).    Medications:   Delphine Simms has a current medication list which includes the following prescription(s): acetaminophen, amlodipine, ascorbic acid (vitamin c), carbidopa, carbidopa-levodopa  mg, latanoprost, lidocaine, meloxicam, and multivitamin.    Allergies:   Review of patient's allergies indicates:   Allergen Reactions    Codeine      Other reaction(s): Unknown    Morphine      Other reaction(s): Unknown    Sulfa (sulfonamide antibiotics)      Other reaction(s): Rash        Objective      Observation: Patient presents with an overall  pleasant general affect who does not appear to be in any acute distress.     Gait: Ambulates independently with no assistance and displays decreased hip extension bilaterally.     Dermatomes: Intact to light touch in bilateral lower extremities from L2-S2     Myotomes:    Right  Left    L2 5/5 4/5   L3 5/5 4/5   L4 5/5 4/5   L5 5/5 4/5   S1 5/5 4/5   S2 5/5 4/5     Range of Motion:  Lumbar   Percentage  Pain    Flexion 60% No Pain   Extension 50% No Pain   Right Sidebend  50% No Pain   Left Sidebend  50% No Pain   Right Rotation 50% No Pain   Left Rotation  60% No Pain     Hip    Right  Left    Flexion 95 degrees 90 degrees    Internal Rotation 30 degrees  30 degrees    External Rotation  40 degrees  40 degrees      Lower Extremity Strength (MMT):   Right  Left    Hip Flexion 4-/5 3+/5   Hip Abduction  3+/5 3/5   Knee Flexion 4/5 4-/5   Knee Extension 4+/5 4/5   Dorsiflexion 4+/5 4-/5   Plantarflexion 4+/5 4-/5     Joint Mobility: Decreased hip mobility with inferior glides on left and PA bilaterally.     Palpation: No notable tenderness to palpation.     Special Tests:  - AGMR: (+) L, (+) R  - CHERELLE: (+) L, (-) R   - Single Leg Stance: noticeable hip drop and discomfort on left   - FADIR: (-) L, (-) R   - Flexion/Extension bias: (-) both     Limitation/Restriction for FOTO Hip Survey    Therapist reviewed FOTO scores for Delphine Perdomo on 6/5/2023.   FOTO documents entered into [employer] - see Media section.    Limitation Score: 54%         Treatment     Total Treatment time (time-based codes) separate from Evaluation: 17 minutes     Delphine received the treatments listed below:      therapeutic exercises to develop strength, endurance, ROM, flexibility, posture, and core stabilization for 00 minutes including:      manual therapy techniques: Joint mobilizations were applied to the: hip for 05 minutes, including:    (L) hip mobility inferior glides grade II-III     neuromuscular re-education activities to improve:  Balance, Coordination, Kinesthetic, Sense, Proprioception, and Posture for 12 minutes. The following activities were included:    Pt education on PT POC, prognosis, and HEP   Glute med isometrics (clamshells) GreenTB 3x45 sec holds with 2 min rest breaks   - improved symptoms   Bridge 1x10 reps   - emphasis on glute squeeze  TA activation 1x10 reps 5 sec holds     therapeutic activities to improve functional performance for 00  minutes, including:      gait training to improve functional mobility and safety for 00  minutes, including:          Patient Education and Home Exercises     Education provided:   - PT POC, Prognosis, and HEP     Written Home Exercises Provided: yes. Exercises were reviewed and Delphine was able to demonstrate them prior to the end of the session.  Delphine demonstrated good  understanding of the education provided. See EMR under Patient Instructions for exercises provided during therapy sessions.    Assessment     Delphine Simms is a 77 y.o. female referred to outpatient Physical Therapy with a medical diagnosis of M47.816 (ICD-10-CM) - Lumbar spondylosis and M43.10 (ICD-10-CM) - Spondylolisthesis, acquired. Patient presents with decreased range of motion, decreased lower extremity strength, gait abnormality, decreased joint mobility, and functional limitations in recreational activities, ADLs, and ambulation. Patient would benefit from skilled PT intervention to address above stated deficits to improve overall functional mobility.     Patient prognosis is Good.   Patient will benefit from skilled outpatient Physical Therapy to address the deficits stated above and in the chart below, provide patient /family education, and to maximize patientt's level of independence.     Plan of care discussed with patient: Yes  Patient's spiritual, cultural and educational needs considered and patient is agreeable to the plan of care and goals as stated below:     Anticipated Barriers for therapy: Scheduling,  chronicity of symptoms, age     Medical Necessity is demonstrated by the following  History  Co-morbidities and personal factors that may impact the plan of care [] LOW: no personal factors / co-morbidities  [x] MODERATE: 1-2 personal factors / co-morbidities  [] HIGH: 3+ personal factors / co-morbidities    Moderate / High Support Documentation: Parkinson's, age, BMI, spinal stenosis     Examination  Body Structures and Functions, activity limitations and participation restrictions that may impact the plan of care [] LOW: addressing 1-2 elements  [x] MODERATE: 3+ elements  [] HIGH: 3+ elements (please support below)    Moderate / High Support Documentation: Range of motion, strength, gait, joint mobility, motor control, neuromuscular re-education      Clinical Presentation [x] LOW: stable  [] MODERATE: Evolving  [] HIGH: Unstable     Decision Making/ Complexity Score: low       Goals:  Short Term Goals: 4 weeks   Patient will be independent in initial HEP to help supplement PT.   Patient will improve lumbar range of motion to >/= 70% in all planes to help with ADLs.   Patient will improve hip flexion to >/= 110 degrees to help with gait mechanics.     Long Term Goals: 8 weeks   Patient will be independent in updated HEP to help supplement PT and maintain gains made in PT.   Patient will improve FOTO limitation score to </= 37% to show improvement in condition.   Patient will improve hip strength by 1/3 MMT to help with ADLS.   Patient will be able to return to gym activities pain free.   Plan     Plan of care Certification: 6/5/2023 to 8/20/2023.    Outpatient Physical Therapy 1-2 times weekly for 8 weeks to include the following interventions: Gait Training, Manual Therapy, Moist Heat/ Ice, Neuromuscular Re-ed, Patient Education, Self Care, Therapeutic Activities, and Therapeutic Exercise.     Nancy Sousa, PT, DPT.

## 2023-06-13 ENCOUNTER — CLINICAL SUPPORT (OUTPATIENT)
Dept: REHABILITATION | Facility: HOSPITAL | Age: 78
End: 2023-06-13
Payer: MEDICARE

## 2023-06-13 DIAGNOSIS — R29.898 DECREASED STRENGTH OF LOWER EXTREMITY: Primary | ICD-10-CM

## 2023-06-13 DIAGNOSIS — M47.816 LUMBAR SPONDYLOSIS: Primary | ICD-10-CM

## 2023-06-13 PROCEDURE — 97112 NEUROMUSCULAR REEDUCATION: CPT

## 2023-06-13 RX ORDER — MELOXICAM 15 MG/1
TABLET ORAL
Qty: 30 TABLET | Refills: 0 | Status: SHIPPED | OUTPATIENT
Start: 2023-06-13 | End: 2024-01-30

## 2023-06-13 NOTE — PROGRESS NOTES
[facility]   Physical Therapy Treatment Note      Name: Delphine Perdomo  Clinic Number: [id number]    Therapy Diagnosis:   Encounter Diagnosis   Name Primary?    Decreased strength of lower extremity Yes     Physician: Austyn Figueroa MD    Visit Date: 6/13/2023    Physician Orders: PT Eval and Treat   Medical Diagnosis from Referral:   M47.816 (ICD-10-CM) - Lumbar spondylosis   M43.10 (ICD-10-CM) - Spondylolisthesis, acquired   Evaluation Date: 6/5/2023  Authorization Period Expiration: 12/31/2023  Plan of Care Expiration: 8/20/2023  Progress Note Due: 7/10/2023  Visit # / Visits authorized: 1/20 + eval   FOTO: 1/3     Precautions: Standard and Parkinson's disease      PTA Visit #: 0/5     Time In: 1:00 PM  Time Out: 1:58 PM  Total Billable Time: 58 minutes    Subjective     Pt reports: Delphine reports that she is doing well today. She had increased symptoms about 1 hour after last treatment session but has since gotten much better as she has continued doing her exercises each day.  She was compliant with home exercise program.  Response to previous treatment: Appropriate muscle response.   Functional change: Ongoing    Pain: 0/10  Location: left back and hip      Patients goals: To be able to return to her exercise program she was doing at the gym without pain and understanding what she can and can't to prevent her from injuring herself.     Objective      Objective Measures updated at progress report unless specified.     Observation: Patient presents with an overall pleasant general affect who does not appear to be in any acute distress.      Gait: Ambulates independently with no assistance and displays decreased hip extension bilaterally.      Dermatomes: Intact to light touch in bilateral lower extremities from L2-S2     Myotomes:     Right  Left    L2 5/5 4/5   L3 5/5 4/5   L4 5/5 4/5   L5 5/5 4/5   S1 5/5 4/5   S2 5/5 4/5      Range of Motion:  Lumbar    Percentage  Pain     Flexion 60% No Pain   Extension 50% No Pain   Right Sidebend  50% No Pain   Left Sidebend  50% No Pain   Right Rotation 50% No Pain   Left Rotation  60% No Pain      Hip     Right  Left    Flexion 95 degrees 90 degrees    Internal Rotation 30 degrees  30 degrees    External Rotation  40 degrees  40 degrees       Lower Extremity Strength (MMT):    Right  Left    Hip Flexion 4-/5 3+/5   Hip Abduction  3+/5 3/5   Knee Flexion 4/5 4-/5   Knee Extension 4+/5 4/5   Dorsiflexion 4+/5 4-/5   Plantarflexion 4+/5 4-/5      Joint Mobility: Decreased hip mobility with inferior glides on left and PA bilaterally.      Palpation: No notable tenderness to palpation.      Special Tests:  - AGMR: (+) L, (+) R  - CHERELLE: (+) L, (-) R   - Single Leg Stance: noticeable hip drop and discomfort on left   - FADIR: (-) L, (-) R   - Flexion/Extension bias: (-) both      Limitation/Restriction for FOTO Hip Survey     Therapist reviewed FOTO scores for Delphine Perdomo on 6/5/2023.   FOTO documents entered into [employer] - see Media section.     Limitation Score: 54%           Treatment     Delphine received the treatments listed below:      therapeutic exercises to develop strength, endurance, ROM, flexibility, posture, and core stabilization for 00 minutes including:     manual therapy techniques: Joint mobilizations were applied to the: hip for 05 minutes, including:  (L) hip mobility inferior glides grade II-III      neuromuscular re-education activities to improve: Balance, Coordination, Kinesthetic, Sense, Proprioception, and Posture for 53 minutes. The following activities were included:  Pt education on PT POC, prognosis, and HEP   Aerobic exercise NuStep 10' or 1000K steps for endogenous release of opioids  Glute med isometrics side lying (clamshells) GreenTB 5x45 sec holds with 2 min rest breaks  Supine clamshells Green TB 2 x 10 with 2 sec hold  Bridge 2 x 10 reps  - emphasis on glute squeeze  Bridge with glut med isometric 3 sec hold 2  x 10  TA activation 2 x 10 reps 5 sec holds  Straight leg raise 2 x 10 bilateral      therapeutic activities to improve functional performance for 00  minutes, including:     gait training to improve functional mobility and safety for 00  minutes, including:    Patient Education and Home Exercises       Education provided:   - PT POC, Prognosis, and HEP     Written Home Exercises Provided: Patient instructed to cont prior HEP. Exercises were reviewed and Delphine was able to demonstrate them prior to the end of the session.  Delphine demonstrated good  understanding of the education provided. See EMR under Patient Instructions for exercises provided during therapy sessions    Assessment     Delphine presents to the clinic for first follow up after initial evaluation with improvement in symptoms. She was able to tolerate progressions of exercise interventions without any adverse reactions or reports of exacerbation of symptoms. Educated patient that she can perform any exercises that were added today to her home exercise program and/or at the gym as long as there are no exacerbation of concordant sign. Patient's greatest deficit was noted with left straight leg raise this date as compared to the right side due to lack of strength and motor control. Will continue to focus on deficits noted during evaluation and progress to patient's main goal to return to the gym.     Delphine Is progressing well towards her goals.   Pt prognosis is Good.     Pt will continue to benefit from skilled outpatient physical therapy to address the deficits listed in the problem list box on initial evaluation, provide pt/family education and to maximize pt's level of independence in the home and community environment.     Pt's spiritual, cultural and educational needs considered and pt agreeable to plan of care and goals.     Anticipated Barriers for therapy: Scheduling, chronicity of symptoms, age     Goals: Short Term Goals: 4 weeks (Progressing,  Not Met)  Patient will be independent in initial HEP to help supplement PT.   Patient will improve lumbar range of motion to >/= 70% in all planes to help with ADLs.   Patient will improve hip flexion to >/= 110 degrees to help with gait mechanics.      Long Term Goals: 8 weeks (Progressing, Not Met)  Patient will be independent in updated HEP to help supplement PT and maintain gains made in PT.   Patient will improve FOTO limitation score to </= 37% to show improvement in condition.   Patient will improve hip strength by 1/3 MMT to help with ADLS.   Patient will be able to return to gym activities pain free.     Plan     Plan of care Certification: 6/5/2023 to 8/20/2023.     Outpatient Physical Therapy 1-2 times weekly for 8 weeks to include the following interventions: Gait Training, Manual Therapy, Moist Heat/ Ice, Neuromuscular Re-ed, Patient Education, Self Care, Therapeutic Activities, and Therapeutic Exercise.     Arlyn Bella, PT

## 2023-06-19 ENCOUNTER — CLINICAL SUPPORT (OUTPATIENT)
Dept: REHABILITATION | Facility: HOSPITAL | Age: 78
End: 2023-06-19
Payer: MEDICARE

## 2023-06-19 DIAGNOSIS — R29.898 DECREASED STRENGTH OF LOWER EXTREMITY: Primary | ICD-10-CM

## 2023-06-19 PROCEDURE — 97112 NEUROMUSCULAR REEDUCATION: CPT

## 2023-06-19 PROCEDURE — 97140 MANUAL THERAPY 1/> REGIONS: CPT

## 2023-06-19 NOTE — PROGRESS NOTES
[facility]   Physical Therapy Treatment Note      Name: Delphine Perdomo  Clinic Number: [id number]    Therapy Diagnosis:   Encounter Diagnosis   Name Primary?    Decreased strength of lower extremity Yes     Physician: Austyn Figueroa MD    Visit Date: 6/19/2023  Physician Orders: PT Eval and Treat   Medical Diagnosis from Referral:   M47.816 (ICD-10-CM) - Lumbar spondylosis   M43.10 (ICD-10-CM) - Spondylolisthesis, acquired   Evaluation Date: 6/5/2023  Authorization Period Expiration: 12/31/2023  Plan of Care Expiration: 8/20/2023  Progress Note Due: 7/10/2023  Visit # / Visits authorized: 2/20 + eval   FOTO: 1/3     Precautions: Standard and Parkinson's disease      PTA Visit #: 0/5     Time In: 8:51 am   Time Out: 9:48 am   Total Billable Time: 57 minutes (billable = 25 min)    Subjective     Pt reports: She is doing pretty good. The exercises have been helping. She has not been back to the gym yet but was planning on trying today to see how she does after therapy.   She was compliant with home exercise program.  Response to previous treatment: Positive, independent in HEP   Functional change: Ongoing    Pain: 0/10  Location: left back and hip      Patients goals: To be able to return to her exercise program she was doing at the gym without pain and understanding what she can and can't to prevent her from injuring herself.     Objective      Objective Measures updated at progress report unless specified.     Treatment     Delphine received the treatments listed below:      therapeutic exercises to develop strength, endurance, ROM, flexibility, posture, and core stabilization for 00 minutes including:     manual therapy techniques: Joint mobilizations were applied to the: hip for 10 minutes, including:    Hip assessment   (L) hip mobility inferior glides grade II-III   (L) hip long axis distraction grade II-III     neuromuscular re-education activities to improve: Balance,  Coordination, Kinesthetic, Sense, Proprioception, and Posture for 34 minutes. The following activities were included:    Pt education on return to gym activities, continue with HEP   Bridge with GreenTB around knees 2x10 reps with 3-5 sec holds   Seated hip ER with YellowTB 2x12 reps each   Lateral band walks with RedTB 3 laps each direction (~15 feet)  Paloff press with GreenTB 2x10 reps each 3 sec holds   Knee extension machine DL 3x8 reps 3 sec up and 3 sec down 15#     Not Today:   Aerobic exercise NuStep 10' or 1000K steps for endogenous release of opioids  Glute med isometrics side lying (clamshells) GreenTB 5x45 sec holds with 2 min rest breaks  Supine clamshells Green TB 2 x 10 with 2 sec hold  Bridge with glut med isometric 3 sec hold 2 x 10  Straight leg raise 2 x 10 bilateral      therapeutic activities to improve functional performance for 13 minutes, including:     Double Leg Shuttle Press 3x10-12 reps 75# to help with getting up and down  Single Leg Shuttle Press 3x8 reps 37.5# to help with getting up and down   Treadmill walking 0.8 mph ensuring good form and no pain x3 min   - educated to try at the gym     gait training to improve functional mobility and safety for 00  minutes, including:    Patient Education and Home Exercises       Education provided:   - PT POC, Prognosis, and HEP     Written Home Exercises Provided: Patient instructed to cont prior HEP. Exercises were reviewed and Delphine was able to demonstrate them prior to the end of the session.  Delphine demonstrated good  understanding of the education provided. See EMR under Patient Instructions for exercises provided during therapy sessions    Assessment     Delphine tolerated therapy session well. She continues with reports of improvement in symptoms and compliance with HEP. She presented with improvement in hip mobility compared to initial evaluation. She was further progressed with functional activities and worked on progression with  returning to the gym activities with good tolerance. Further core stability exercises added as well with no adverse effects. Assessed ambulation on treadmill and educated to try at the gym and monitor symptoms with it. Will continue to monitor and progress as able with further assessment with return to gym activities next session.     Delphine Is progressing well towards her goals.   Pt prognosis is Good.     Pt will continue to benefit from skilled outpatient physical therapy to address the deficits listed in the problem list box on initial evaluation, provide pt/family education and to maximize pt's level of independence in the home and community environment.     Pt's spiritual, cultural and educational needs considered and pt agreeable to plan of care and goals.     Anticipated Barriers for therapy: Scheduling, chronicity of symptoms, age     Goals: Short Term Goals: 4 weeks (Progressing, Not Met)  Patient will be independent in initial HEP to help supplement PT. - MET  Patient will improve lumbar range of motion to >/= 70% in all planes to help with ADLs.   Patient will improve hip flexion to >/= 110 degrees to help with gait mechanics.      Long Term Goals: 8 weeks (Progressing, Not Met)  Patient will be independent in updated HEP to help supplement PT and maintain gains made in PT.   Patient will improve FOTO limitation score to </= 37% to show improvement in condition.   Patient will improve hip strength by 1/3 MMT to help with ADLS.   Patient will be able to return to gym activities pain free.     Plan     Plan of care Certification: 6/5/2023 to 8/20/2023.     Continue with current plan of care with emphasis on hip mobility, hip/glute strengthening, gait mechanics, and return to recreational activities.     Outpatient Physical Therapy 1-2 times weekly for 8 weeks to include the following interventions: Gait Training, Manual Therapy, Moist Heat/ Ice, Neuromuscular Re-ed, Patient Education, Self Care,  Therapeutic Activities, and Therapeutic Exercise.     Nancy Sousa, PT, DPT

## 2023-06-26 ENCOUNTER — CLINICAL SUPPORT (OUTPATIENT)
Dept: REHABILITATION | Facility: HOSPITAL | Age: 78
End: 2023-06-26
Payer: MEDICARE

## 2023-06-26 DIAGNOSIS — R29.898 DECREASED STRENGTH OF LOWER EXTREMITY: Primary | ICD-10-CM

## 2023-06-26 PROCEDURE — 97112 NEUROMUSCULAR REEDUCATION: CPT

## 2023-06-26 PROCEDURE — 97140 MANUAL THERAPY 1/> REGIONS: CPT

## 2023-06-26 NOTE — PROGRESS NOTES
[facility]   Physical Therapy Treatment Note      Name: Delphine Perdomo  Clinic Number: [id number]    Therapy Diagnosis:   Encounter Diagnosis   Name Primary?    Decreased strength of lower extremity Yes     Physician: Austyn Figueroa MD    Visit Date: 6/26/2023  Physician Orders: PT Eval and Treat   Medical Diagnosis from Referral:   M47.816 (ICD-10-CM) - Lumbar spondylosis   M43.10 (ICD-10-CM) - Spondylolisthesis, acquired   Evaluation Date: 6/5/2023  Authorization Period Expiration: 12/31/2023  Plan of Care Expiration: 8/20/2023  Progress Note Due: 7/10/2023  Visit # / Visits authorized: 3/20 + eval   FOTO: 1/3     Precautions: Standard and Parkinson's disease      PTA Visit #: 0/5     Time In: 8:45 am  Time Out: 9:41 am   Total Billable Time: 56 minutes (billable = 40 min)    Subjective     Pt reports: She is having increased discomfort last few days and has been going to the gym daily not doing her isometrics lately. She does 15 min on the treadmill, 20 min on the bike, and 10 min on Nustep then does upper body strengthening and leg press daily. She localizes her discomfort to posterior hip that wraps around to the groin area.    She was compliant with home exercise program.  Response to previous treatment: Positive, independent in HEP   Functional change: Ongoing    Pain: 0/10  Location: left back and hip      Patients goals: To be able to return to her exercise program she was doing at the gym without pain and understanding what she can and can't to prevent her from injuring herself.     Objective      Objective Measures updated at progress report unless specified.     Observation 6/26/2023:  - Single leg stance on L positive for pain in posterior/lateral hip   - Step up on L pain reported in left hip posterior   - Pain with hip adduction on L   - CHERELLE: (+) on L for groin pain  - FADIR: (+) on L for groin and lateral hip pain   - Flick Assessment: Hypomobile on R compared to  left   - Supine to sit: Right anteriorly rotated innominate     Following SI joint correction improvement in symptoms with single limb stance and step ups. Following isometrics improvement in single limb stance and same as after SI correction no better or worse.     Treatment     Delphine received the treatments listed below:      therapeutic exercises to develop strength, endurance, ROM, flexibility, posture, and core stabilization for 00 minutes including:     manual therapy techniques: Joint mobilizations were applied to the: hip for 10 minutes, including:    Hip assessment   SI MET for rotated innominate 3x6 sec holds  (L) Hip long axis distraction grade II-III    Not Today:  (L) hip mobility inferior glides grade II-III      neuromuscular re-education activities to improve: Balance, Coordination, Kinesthetic, Sense, Proprioception, and Posture for 42 minutes. The following activities were included:    Assessment findings and return to gym activities with proper loading   Hip adduction with gait belt 10x10 sec holds   Hip abduction with ball 10x10 sec holds   Bridge with GreenTB around knees 2x10 reps with 3-5 sec holds   Bridge with GreenTB and B lat pulldown RedTB 2x8 reps   Paloff press GreenTB 2x10 reps with 3 sec holds   Glute isometrics (sidelying clamshells) with GreenTB 3x45 sec with 2 min rest breaks between   Seated hip ER with RedTB 3x10 reps each     Not Today:  Lateral band walks with RedTB 3 laps each direction (~15 feet)  Knee extension machine DL 3x8 reps 3 sec up and 3 sec down 15#   Aerobic exercise NuStep 10' or 1000K steps for endogenous release of opioids  Straight leg raise 2 x 10 bilateral      therapeutic activities to improve functional performance for 04 minutes, including:     Double Leg Shuttle Press 3x10-12 reps with YellowTB to increase glute activation and monitor valgus 62.5 to help with getting up and down    Not Today:  Single Leg Shuttle Press 3x8 reps 37.5# to help with getting  up and down   Treadmill walking 0.8 mph ensuring good form and no pain x3 min   - educated to try at the gym     gait training to improve functional mobility and safety for 00  minutes, including:    Patient Education and Home Exercises       Education provided:   - PT POC, Prognosis, and HEP     Written Home Exercises Provided: Patient instructed to cont prior HEP. Exercises were reviewed and Delphine was able to demonstrate them prior to the end of the session.  Delphine demonstrated good  understanding of the education provided. See EMR under Patient Instructions for exercises provided during therapy sessions    Assessment     Delphine presented to today's session with slight increase in pain since last session rating at a 5/10. She reports increased activity with returning to gym daily and not as compliant with HEP. Upon further assessment presented with an SI joint dysfunction and continued positives for glute tendinopathy. SI joint correction followed by stability exercises and glute isometrics significant improvement in symptoms. She was educated on easing back into cardio exercises and return to gym activities making sure not to over do it to which she verbalized understanding. Tolerated all therex well today with no adverse effects with continued focus on core stability and glute/hip strengthening. Will continue to monitor and progress as able.      Delphine Is progressing well towards her goals.   Pt prognosis is Good.     Pt will continue to benefit from skilled outpatient physical therapy to address the deficits listed in the problem list box on initial evaluation, provide pt/family education and to maximize pt's level of independence in the home and community environment.     Pt's spiritual, cultural and educational needs considered and pt agreeable to plan of care and goals.     Anticipated Barriers for therapy: Scheduling, chronicity of symptoms, age     Goals: Short Term Goals: 4 weeks (Progressing, Not  Met)  Patient will be independent in initial HEP to help supplement PT. - MET  Patient will improve lumbar range of motion to >/= 70% in all planes to help with ADLs.   Patient will improve hip flexion to >/= 110 degrees to help with gait mechanics.      Long Term Goals: 8 weeks (Progressing, Not Met)  Patient will be independent in updated HEP to help supplement PT and maintain gains made in PT.   Patient will improve FOTO limitation score to </= 37% to show improvement in condition.   Patient will improve hip strength by 1/3 MMT to help with ADLS.   Patient will be able to return to gym activities pain free.     Plan     Plan of care Certification: 6/5/2023 to 8/20/2023.     Continue with current plan of care with emphasis on hip mobility, hip/glute strengthening, gait mechanics, and return to recreational activities.     Outpatient Physical Therapy 1-2 times weekly for 8 weeks to include the following interventions: Gait Training, Manual Therapy, Moist Heat/ Ice, Neuromuscular Re-ed, Patient Education, Self Care, Therapeutic Activities, and Therapeutic Exercise.     Nancy Sousa, PT, DPT

## 2023-07-03 ENCOUNTER — CLINICAL SUPPORT (OUTPATIENT)
Dept: REHABILITATION | Facility: HOSPITAL | Age: 78
End: 2023-07-03
Payer: MEDICARE

## 2023-07-03 DIAGNOSIS — R29.898 DECREASED STRENGTH OF LOWER EXTREMITY: Primary | ICD-10-CM

## 2023-07-03 PROCEDURE — 97140 MANUAL THERAPY 1/> REGIONS: CPT

## 2023-07-03 PROCEDURE — 97112 NEUROMUSCULAR REEDUCATION: CPT

## 2023-07-03 NOTE — PROGRESS NOTES
[facility]   Physical Therapy Re-Assessment / Treatment Note      Name: Delphine Perdomo  Clinic Number: [id number]    Therapy Diagnosis:   Encounter Diagnosis   Name Primary?    Decreased strength of lower extremity Yes     Physician: Austyn Figueroa MD    Visit Date: 7/3/2023  Physician Orders: PT Eval and Treat   Medical Diagnosis from Referral:   M47.816 (ICD-10-CM) - Lumbar spondylosis   M43.10 (ICD-10-CM) - Spondylolisthesis, acquired   Evaluation Date: 6/5/2023  Authorization Period Expiration: 12/31/2023  Plan of Care Expiration: 8/20/2023  Progress Note Due: 8/10/2023  Visit # / Visits authorized: 4/20 + eval   FOTO: 1/3     Precautions: Standard and Parkinson's disease      PTA Visit #: 0/5     Time In: 8:56 am   Time Out: 9:58 am   Total Billable Time: 62 minutes (billable = 30 min)    Subjective     Pt reports: She is doing so/so she can tell she is getting better as she is able to walk without pain and has been able to go to the gym. She has quit the treadmill at the gym for now doing instead about 30 min on the bike and 10 min on Nustep.   She was compliant with home exercise program.  Response to previous treatment: Positive, independent in HEP   Functional change: Ongoing    Pain: 0/10  Location: left back and hip      Patients goals: To be able to return to her exercise program she was doing at the gym without pain and understanding what she can and can't to prevent her from injuring herself.     Objective      Objective Measures updated at progress report unless specified.     Observation 6/26/2023:  - Single leg stance on L positive for pain in posterior/lateral hip   - Step up on L pain reported in left hip posterior   - Pain with hip adduction on L   - CHERELLE: (+) on L for groin pain  - FADIR: (+) on L for groin and lateral hip pain   - Flick Assessment: Hypomobile on R compared to left   - Supine to sit: Right anteriorly rotated innominate     Following SI joint  correction improvement in symptoms with single limb stance and step ups. Following isometrics improvement in single limb stance and same as after SI correction no better or worse.     Observation 7/3/2023:  Range of Motion:  Lumbar    Percentage  Pain    Flexion 70% No Pain   Extension 50% No Pain   Right Sidebend  60% No Pain   Left Sidebend  60% No Pain   Right Rotation 75% No Pain   Left Rotation  75% No Pain      Hip     Right  Left    Flexion 100 degrees 95 degrees    Internal Rotation 30 degrees  30 degrees    External Rotation  40 degrees  40 degrees       Lower Extremity Strength (MMT):    Right  Left    Hip Flexion 4-/5 3+/5   Hip Abduction  3+/5 3/5   Knee Flexion 4/5 4-/5   Knee Extension 4+/5 4/5   Dorsiflexion 4+/5 4-/5   Plantarflexion 4+/5 4-/5      Special Tests:  - AGMR: (+) L, (+) R  - CHERELLE: (+) L, (-) R   - FADIR: (-) L, (-) R   - Flexion/Extension bias: (-) both   - Supine to Sit: Anteriorly rotated innominate on the left  - Flick Assessment: hypomobile on the left   - SI Distraction: (+)  - SI Compression: (-)    Functional Tests:  - Single Leg Balance: Right = 4 sec, Left = 2 sec (noticeable hip drop and patient reported discomfort on the left  - Double Leg Squat: Noticeable weight shift to the right and quad dominant.   - Stair Assessment: Patient reported pain stepping up on left lower extremity.   --- Following SI manipulation relieved symptoms on the stairs and no pain with single limb stance.    FOTO Limitation Score: 42% (initial = 54%)    Treatment     Delphine received the treatments listed below:      therapeutic exercises to develop strength, endurance, ROM, flexibility, posture, and core stabilization for 00 minutes including:     manual therapy techniques: Joint mobilizations were applied to the: hip for 13 minutes, including:    Hip assessment   SI MET for rotated innominate 3x6 sec holds  SI 2 person manipulation grade V   - relieved symptoms and symptoms with stairs minimal   (L)  hip mobility inferior glides grade II-III     Not Today:  (L) Hip long axis distraction grade II-III     neuromuscular re-education activities to improve: Balance, Coordination, Kinesthetic, Sense, Proprioception, and Posture for 36 minutes. The following activities were included:    Assessment findings and return to gym activities with proper loading   Hip adduction with gait belt 10x10 sec holds   Hip abduction with ball 10x10 sec holds   PPT with BP cuff 2x10 reps with 10 sec holds   PPT with BP cuff and BKFO 1x10 reps each LE   LAQ 2# 3x8 reps with 3 sec holds   Seated hip ER with RedTB 3x10 reps each     Not Today:  Bridge with GreenTB around knees 2x10 reps with 3-5 sec holds   Bridge with GreenTB and B lat pulldown RedTB 2x8 reps   Paloff press GreenTB 2x10 reps with 3 sec holds   Glute isometrics (sidelying clamshells) with GreenTB 3x45 sec with 2 min rest breaks between   Lateral band walks with RedTB 3 laps each direction (~15 feet)  Knee extension machine DL 3x8 reps 3 sec up and 3 sec down 15#   Straight leg raise 2 x 10 bilateral      therapeutic activities to improve functional performance for 12 minutes, including:     Aerobic exercise NuStep 8' or 1000K steps for endogenous release of opioids  Double Leg Shuttle Press 3x10-12 reps with YellowTB to increase glute activation and monitor valgus 62.5# to help with getting up and down    Not Today:  Single Leg Shuttle Press 3x8 reps 37.5# to help with getting up and down   Treadmill walking 0.8 mph ensuring good form and no pain x3 min   - educated to try at the gym     gait training to improve functional mobility and safety for 00  minutes, including:    Patient Education and Home Exercises       Education provided:   - PT POC, Prognosis, and HEP     Written Home Exercises Provided: Patient instructed to cont prior HEP. Exercises were reviewed and Delphine was able to demonstrate them prior to the end of the session.  Delphine demonstrated good   understanding of the education provided. See EMR under Patient Instructions for exercises provided during therapy sessions    Assessment     Delphine has been seen for a total of 4 visits plus initial evaluation for a medical diagnosis of M47.816 (ICD-10-CM) - Lumbar spondylosis and M43.10 (ICD-10-CM) - Spondylolisthesis, acquired. She has shown improvement in her range of motion and function with improvement in FOTO score since beginning physical therapy. She continues with positive SI dysfunction which corrects with manual therapy and able to relieve symptoms with stair negotiation following. Further core stability added to today's session and progression with strengthening with good tolerance. She was educated on progress and importance of continued core stability to help with SI joint dysfunction and progressing with strengthening. Will continue to monitor and progress as able with emphasis on core stability, lower extremity strengthening, and progression with functional activities.     Delphine Is progressing well towards her goals.   Pt prognosis is Good.     Pt will continue to benefit from skilled outpatient physical therapy to address the deficits listed in the problem list box on initial evaluation, provide pt/family education and to maximize pt's level of independence in the home and community environment.     Pt's spiritual, cultural and educational needs considered and pt agreeable to plan of care and goals.     Anticipated Barriers for therapy: Scheduling, chronicity of symptoms, age     Goals: Short Term Goals: 4 weeks (Progressing, Not Met)  Patient will be independent in initial HEP to help supplement PT. - MET  Patient will improve lumbar range of motion to >/= 70% in all planes to help with ADLs.   Patient will improve hip flexion to >/= 110 degrees to help with gait mechanics.      Long Term Goals: 8 weeks (Progressing, Not Met)  Patient will be independent in updated HEP to help supplement PT and  maintain gains made in PT.   Patient will improve FOTO limitation score to </= 37% to show improvement in condition.   Patient will improve hip strength by 1/3 MMT to help with ADLS.   Patient will be able to return to gym activities pain free.     Plan     Plan of care Certification: 6/5/2023 to 8/20/2023.     Continue with current plan of care with emphasis on hip mobility, hip/glute strengthening, gait mechanics, and return to recreational activities.     Outpatient Physical Therapy 1-2 times weekly for 8 weeks to include the following interventions: Gait Training, Manual Therapy, Moist Heat/ Ice, Neuromuscular Re-ed, Patient Education, Self Care, Therapeutic Activities, and Therapeutic Exercise.     Nancy Sousa, PT, DPT

## 2023-07-10 ENCOUNTER — CLINICAL SUPPORT (OUTPATIENT)
Dept: REHABILITATION | Facility: HOSPITAL | Age: 78
End: 2023-07-10
Payer: MEDICARE

## 2023-07-10 DIAGNOSIS — R29.898 DECREASED STRENGTH OF LOWER EXTREMITY: Primary | ICD-10-CM

## 2023-07-10 PROCEDURE — 97140 MANUAL THERAPY 1/> REGIONS: CPT

## 2023-07-10 NOTE — PLAN OF CARE
[facility]   Physical Therapy Re-Assessment / Treatment Note      Name: Delphine Perdomo  Clinic Number: [id number]    Therapy Diagnosis:   Encounter Diagnosis   Name Primary?    Decreased strength of lower extremity Yes     Physician: Austyn Figueroa MD    Visit Date: 7/3/2023  Physician Orders: PT Eval and Treat   Medical Diagnosis from Referral:   M47.816 (ICD-10-CM) - Lumbar spondylosis   M43.10 (ICD-10-CM) - Spondylolisthesis, acquired   Evaluation Date: 6/5/2023  Authorization Period Expiration: 12/31/2023  Plan of Care Expiration: 8/20/2023  Progress Note Due: 8/10/2023  Visit # / Visits authorized: 4/20 + eval   FOTO: 1/3     Precautions: Standard and Parkinson's disease      PTA Visit #: 0/5     Time In: 8:56 am   Time Out: 9:58 am   Total Billable Time: 62 minutes (billable = 30 min)    Subjective     Pt reports: She is doing so/so she can tell she is getting better as she is able to walk without pain and has been able to go to the gym. She has quit the treadmill at the gym for now doing instead about 30 min on the bike and 10 min on Nustep.   She was compliant with home exercise program.  Response to previous treatment: Positive, independent in HEP   Functional change: Ongoing    Pain: 0/10  Location: left back and hip      Patients goals: To be able to return to her exercise program she was doing at the gym without pain and understanding what she can and can't to prevent her from injuring herself.     Objective      Objective Measures updated at progress report unless specified.     Observation 6/26/2023:  - Single leg stance on L positive for pain in posterior/lateral hip   - Step up on L pain reported in left hip posterior   - Pain with hip adduction on L   - CHERELLE: (+) on L for groin pain  - FADIR: (+) on L for groin and lateral hip pain   - Flick Assessment: Hypomobile on R compared to left   - Supine to sit: Right anteriorly rotated innominate     Following SI joint  correction improvement in symptoms with single limb stance and step ups. Following isometrics improvement in single limb stance and same as after SI correction no better or worse.     Observation 7/3/2023:  Range of Motion:  Lumbar    Percentage  Pain    Flexion 70% No Pain   Extension 50% No Pain   Right Sidebend  60% No Pain   Left Sidebend  60% No Pain   Right Rotation 75% No Pain   Left Rotation  75% No Pain      Hip     Right  Left    Flexion 100 degrees 95 degrees    Internal Rotation 30 degrees  30 degrees    External Rotation  40 degrees  40 degrees       Lower Extremity Strength (MMT):    Right  Left    Hip Flexion 4-/5 3+/5   Hip Abduction  3+/5 3/5   Knee Flexion 4/5 4-/5   Knee Extension 4+/5 4/5   Dorsiflexion 4+/5 4-/5   Plantarflexion 4+/5 4-/5      Special Tests:  - AGMR: (+) L, (+) R  - CHERELLE: (+) L, (-) R   - FADIR: (-) L, (-) R   - Flexion/Extension bias: (-) both   - Supine to Sit: Anteriorly rotated innominate on the left  - Flick Assessment: hypomobile on the left   - SI Distraction: (+)  - SI Compression: (-)    Functional Tests:  - Single Leg Balance: Right = 4 sec, Left = 2 sec (noticeable hip drop and patient reported discomfort on the left  - Double Leg Squat: Noticeable weight shift to the right and quad dominant.   - Stair Assessment: Patient reported pain stepping up on left lower extremity.   --- Following SI manipulation relieved symptoms on the stairs and no pain with single limb stance.    FOTO Limitation Score: 42% (initial = 54%)    Treatment     Delphine received the treatments listed below:      therapeutic exercises to develop strength, endurance, ROM, flexibility, posture, and core stabilization for 00 minutes including:     manual therapy techniques: Joint mobilizations were applied to the: hip for 13 minutes, including:    Hip assessment   SI MET for rotated innominate 3x6 sec holds  SI 2 person manipulation grade V   - relieved symptoms and symptoms with stairs minimal   (L)  hip mobility inferior glides grade II-III     Not Today:  (L) Hip long axis distraction grade II-III     neuromuscular re-education activities to improve: Balance, Coordination, Kinesthetic, Sense, Proprioception, and Posture for 36 minutes. The following activities were included:    Assessment findings and return to gym activities with proper loading   Hip adduction with gait belt 10x10 sec holds   Hip abduction with ball 10x10 sec holds   PPT with BP cuff 2x10 reps with 10 sec holds   PPT with BP cuff and BKFO 1x10 reps each LE   LAQ 2# 3x8 reps with 3 sec holds   Seated hip ER with RedTB 3x10 reps each     Not Today:  Bridge with GreenTB around knees 2x10 reps with 3-5 sec holds   Bridge with GreenTB and B lat pulldown RedTB 2x8 reps   Paloff press GreenTB 2x10 reps with 3 sec holds   Glute isometrics (sidelying clamshells) with GreenTB 3x45 sec with 2 min rest breaks between   Lateral band walks with RedTB 3 laps each direction (~15 feet)  Knee extension machine DL 3x8 reps 3 sec up and 3 sec down 15#   Straight leg raise 2 x 10 bilateral      therapeutic activities to improve functional performance for 12 minutes, including:     Aerobic exercise NuStep 8' or 1000K steps for endogenous release of opioids  Double Leg Shuttle Press 3x10-12 reps with YellowTB to increase glute activation and monitor valgus 62.5# to help with getting up and down    Not Today:  Single Leg Shuttle Press 3x8 reps 37.5# to help with getting up and down   Treadmill walking 0.8 mph ensuring good form and no pain x3 min   - educated to try at the gym     gait training to improve functional mobility and safety for 00  minutes, including:    Patient Education and Home Exercises       Education provided:   - PT POC, Prognosis, and HEP     Written Home Exercises Provided: Patient instructed to cont prior HEP. Exercises were reviewed and Delphine was able to demonstrate them prior to the end of the session.  Delphine demonstrated good   understanding of the education provided. See EMR under Patient Instructions for exercises provided during therapy sessions    Assessment     Delphine has been seen for a total of 4 visits plus initial evaluation for a medical diagnosis of M47.816 (ICD-10-CM) - Lumbar spondylosis and M43.10 (ICD-10-CM) - Spondylolisthesis, acquired. She has shown improvement in her range of motion and function with improvement in FOTO score since beginning physical therapy. She continues with positive SI dysfunction which corrects with manual therapy and able to relieve symptoms with stair negotiation following. Further core stability added to today's session and progression with strengthening with good tolerance. She was educated on progress and importance of continued core stability to help with SI joint dysfunction and progressing with strengthening. Will continue to monitor and progress as able with emphasis on core stability, lower extremity strengthening, and progression with functional activities.     Delphine Is progressing well towards her goals.   Pt prognosis is Good.     Pt will continue to benefit from skilled outpatient physical therapy to address the deficits listed in the problem list box on initial evaluation, provide pt/family education and to maximize pt's level of independence in the home and community environment.     Pt's spiritual, cultural and educational needs considered and pt agreeable to plan of care and goals.     Anticipated Barriers for therapy: Scheduling, chronicity of symptoms, age     Goals: Short Term Goals: 4 weeks (Progressing, Not Met)  Patient will be independent in initial HEP to help supplement PT. - MET  Patient will improve lumbar range of motion to >/= 70% in all planes to help with ADLs.   Patient will improve hip flexion to >/= 110 degrees to help with gait mechanics.      Long Term Goals: 8 weeks (Progressing, Not Met)  Patient will be independent in updated HEP to help supplement PT and  maintain gains made in PT.   Patient will improve FOTO limitation score to </= 37% to show improvement in condition.   Patient will improve hip strength by 1/3 MMT to help with ADLS.   Patient will be able to return to gym activities pain free.     Plan     Plan of care Certification: 6/5/2023 to 8/20/2023.     Continue with current plan of care with emphasis on hip mobility, hip/glute strengthening, gait mechanics, and return to recreational activities.     Outpatient Physical Therapy 1-2 times weekly for 8 weeks to include the following interventions: Gait Training, Manual Therapy, Moist Heat/ Ice, Neuromuscular Re-ed, Patient Education, Self Care, Therapeutic Activities, and Therapeutic Exercise.     Nancy Sousa, PT, DPT

## 2023-07-10 NOTE — PROGRESS NOTES
[facility]PAT[facility]ERAPY AND [facility]   Physical Therapy Daily Treatment Note      Name: Delphine Perdomo  Clinic Number: [id number]    Therapy Diagnosis:   Encounter Diagnosis   Name Primary?    Decreased strength of lower extremity Yes     Physician: Austyn Figueroa MD    Visit Date: 7/10/2023  Physician Orders: PT Eval and Treat   Medical Diagnosis from Referral:   M47.816 (ICD-10-CM) - Lumbar spondylosis   M43.10 (ICD-10-CM) - Spondylolisthesis, acquired   Evaluation Date: 6/5/2023  Authorization Period Expiration: 12/31/2023  Plan of Care Expiration: 8/20/2023  Progress Note Due: 8/10/2023  Visit # / Visits authorized: 5/20 + eval   FOTO: 1/3     Precautions: Standard and Parkinson's disease      PTA Visit #: 0/5     Time In: 8:55 am   Time Out: 9:50 am   Total Billable Time: 55 minutes (billable = 18 min)    Subjective     Pt reports: She is feeling better this week. Has been able to do more at the gym.   She was compliant with home exercise program.  Response to previous treatment: Positive, independent in HEP   Functional change: Ongoing    Pain: 0/10  Location: left back and hip      Patients goals: To be able to return to her exercise program she was doing at the gym without pain and understanding what she can and can't to prevent her from injuring herself.     Objective      Objective Measures updated at progress report unless specified.     Observation 6/26/2023:  - Single leg stance on L positive for pain in posterior/lateral hip   - Step up on L pain reported in left hip posterior   - Pain with hip adduction on L   - CHERELLE: (+) on L for groin pain  - FADIR: (+) on L for groin and lateral hip pain   - Flick Assessment: Hypomobile on R compared to left   - Supine to sit: Right anteriorly rotated innominate     Following SI joint correction improvement in symptoms with single limb stance and step ups. Following isometrics improvement in single limb stance and same as after SI correction no better or  worse.     Observation 7/3/2023:  Range of Motion:  Lumbar    Percentage  Pain    Flexion 70% No Pain   Extension 50% No Pain   Right Sidebend  60% No Pain   Left Sidebend  60% No Pain   Right Rotation 75% No Pain   Left Rotation  75% No Pain      Hip     Right  Left    Flexion 100 degrees 95 degrees    Internal Rotation 30 degrees  30 degrees    External Rotation  40 degrees  40 degrees       Lower Extremity Strength (MMT):    Right  Left    Hip Flexion 4-/5 3+/5   Hip Abduction  3+/5 3/5   Knee Flexion 4/5 4-/5   Knee Extension 4+/5 4/5   Dorsiflexion 4+/5 4-/5   Plantarflexion 4+/5 4-/5      Special Tests:  - AGMR: (+) L, (+) R  - CHERELLE: (+) L, (-) R   - FADIR: (-) L, (-) R   - Flexion/Extension bias: (-) both   - Supine to Sit: Anteriorly rotated innominate on the left  - Flick Assessment: hypomobile on the left   - SI Distraction: (+)  - SI Compression: (-)    Functional Tests:  - Single Leg Balance: Right = 4 sec, Left = 2 sec (noticeable hip drop and patient reported discomfort on the left  - Double Leg Squat: Noticeable weight shift to the right and quad dominant.   - Stair Assessment: Patient reported pain stepping up on left lower extremity.   --- Following SI manipulation relieved symptoms on the stairs and no pain with single limb stance.    FOTO Limitation Score: 42% (initial = 54%)    Treatment     Delphine received the treatments listed below:      therapeutic exercises to develop strength, endurance, ROM, flexibility, posture, and core stabilization for 00 minutes including:     manual therapy techniques: Joint mobilizations were applied to the: hip for 12 minutes, including:    Hip assessment   SI MET for rotated innominate 3x6 sec holds  (L) Hip long axis distraction grade II-III  (L) hip mobility inferior glides grade II-III     Not Today:  SI 2 person manipulation grade V   - relieved symptoms and symptoms with stairs minimal      neuromuscular re-education activities to improve: Balance,  Coordination, Kinesthetic, Sense, Proprioception, and Posture for 28 minutes. The following activities were included:    Assessment findings and return to gym activities with proper loading   Bridge with GreenTB around knees 2x10 reps with 5 sec holds   Bridge with GreenTB and B lat pulldown RedTB 2x10 reps 3 sec holds    Seated hip ER with RedTB 3x10 reps each   Paloff press GreenTB 2x10 reps with 3 sec holds   Lateral band walks with RedTB 3 laps each direction (~15 feet)    Not Today:  Hip adduction with gait belt 10x10 sec holds   Hip abduction with ball 10x10 sec holds   PPT with BP cuff 2x10 reps with 10 sec holds   PPT with BP cuff and BKFO 1x10 reps each LE   LAQ 2# 3x8 reps with 3 sec holds   Glute isometrics (sidelying clamshells) with GreenTB 3x45 sec with 2 min rest breaks between   Knee extension machine DL 3x8 reps 3 sec up and 3 sec down 15#   Straight leg raise 2 x 10 bilateral      therapeutic activities to improve functional performance for 15 minutes, including:     Double Leg Shuttle Press 3x10-12 reps with YellowTB to increase glute activation and monitor valgus 62.5# to help with getting up and down  Single Leg Shuttle Press 3x8 reps 37.5# to help with getting up and down   Treadmill walking 1.3 mph ensuring good form and no pain x5 min   - educated to try at the gym    Not Today:  Aerobic exercise NuStep 8' or 1000K steps for endogenous release of opioids    gait training to improve functional mobility and safety for 00  minutes, including:    Patient Education and Home Exercises       Education provided:   - PT POC, Prognosis, and HEP     Written Home Exercises Provided: Patient instructed to cont prior HEP. Exercises were reviewed and Delphine was able to demonstrate them prior to the end of the session.  Delphine demonstrated good  understanding of the education provided. See EMR under Patient Instructions for exercises provided during therapy sessions    Assessment     Delphine is continuing to  progress well with physical therapy. She presents with improvement in symptoms and improved SI joint dysfunction. She continues with limitations in hip mobility with good response to manual therapy intervention. Continued progression with hip/glute strengthening as well and core stability with good tolerance. Overall progressing well and continued education on returning to the gym and easing into return to gym activities not overloading her hip. Continue to progress as able.      Delphine Is progressing well towards her goals.   Pt prognosis is Good.     Pt will continue to benefit from skilled outpatient physical therapy to address the deficits listed in the problem list box on initial evaluation, provide pt/family education and to maximize pt's level of independence in the home and community environment.     Pt's spiritual, cultural and educational needs considered and pt agreeable to plan of care and goals.     Anticipated Barriers for therapy: Scheduling, chronicity of symptoms, age     Goals: Short Term Goals: 4 weeks (Progressing, Not Met)  Patient will be independent in initial HEP to help supplement PT. - MET  Patient will improve lumbar range of motion to >/= 70% in all planes to help with ADLs.   Patient will improve hip flexion to >/= 110 degrees to help with gait mechanics.      Long Term Goals: 8 weeks (Progressing, Not Met)  Patient will be independent in updated HEP to help supplement PT and maintain gains made in PT.   Patient will improve FOTO limitation score to </= 37% to show improvement in condition.   Patient will improve hip strength by 1/3 MMT to help with ADLS.   Patient will be able to return to gym activities pain free.     Plan     Plan of care Certification: 6/5/2023 to 8/20/2023.     Continue with current plan of care with emphasis on hip mobility, hip/glute strengthening, gait mechanics, and return to recreational activities.     Outpatient Physical Therapy 1-2 times weekly for 8 weeks  to include the following interventions: Gait Training, Manual Therapy, Moist Heat/ Ice, Neuromuscular Re-ed, Patient Education, Self Care, Therapeutic Activities, and Therapeutic Exercise.     Nancy Sousa, PT, DPT

## 2023-07-17 ENCOUNTER — CLINICAL SUPPORT (OUTPATIENT)
Dept: REHABILITATION | Facility: HOSPITAL | Age: 78
End: 2023-07-17
Payer: MEDICARE

## 2023-07-17 DIAGNOSIS — R29.898 DECREASED STRENGTH OF LOWER EXTREMITY: Primary | ICD-10-CM

## 2023-07-17 PROCEDURE — 97140 MANUAL THERAPY 1/> REGIONS: CPT

## 2023-07-17 NOTE — PROGRESS NOTES
[facility]   Physical Therapy Daily Treatment Note      Name: Delphine Perdomo  Clinic Number: [id number]    Therapy Diagnosis:   Encounter Diagnosis   Name Primary?    Decreased strength of lower extremity Yes     Physician: Austyn Figueroa MD    Visit Date: 7/17/2023  Physician Orders: PT Eval and Treat   Medical Diagnosis from Referral:   M47.816 (ICD-10-CM) - Lumbar spondylosis   M43.10 (ICD-10-CM) - Spondylolisthesis, acquired   Evaluation Date: 6/5/2023  Authorization Period Expiration: 12/31/2023  Plan of Care Expiration: 8/20/2023  Progress Note Due: 8/10/2023  Visit # / Visits authorized: 5/20 + eval   FOTO: 1/3     Precautions: Standard and Parkinson's disease      PTA Visit #: 0/5     Time In: 8:56 am   Time Out: 9:58 am   Total Billable Time: 62 minutes (billable = 18 min)    Subjective     Pt reports: She is doing so/so she can tell she is getting better as she is able to walk without pain and has been able to go to the gym. She has quit the treadmill at the gym for now doing instead about 30 min on the bike and 10 min on Nustep.   She was compliant with home exercise program.  Response to previous treatment: Positive, independent in HEP   Functional change: Ongoing    Pain: 0/10  Location: left back and hip      Patients goals: To be able to return to her exercise program she was doing at the gym without pain and understanding what she can and can't to prevent her from injuring herself.     Objective      Objective Measures updated at progress report unless specified.     Observation 6/26/2023:  - Single leg stance on L positive for pain in posterior/lateral hip   - Step up on L pain reported in left hip posterior   - Pain with hip adduction on L   - CHERELLE: (+) on L for groin pain  - FADIR: (+) on L for groin and lateral hip pain   - Flick Assessment: Hypomobile on R compared to left   - Supine to sit: Right anteriorly rotated innominate     Following SI joint correction  improvement in symptoms with single limb stance and step ups. Following isometrics improvement in single limb stance and same as after SI correction no better or worse.     Observation 7/3/2023:  Range of Motion:  Lumbar    Percentage  Pain    Flexion 70% No Pain   Extension 50% No Pain   Right Sidebend  60% No Pain   Left Sidebend  60% No Pain   Right Rotation 75% No Pain   Left Rotation  75% No Pain      Hip     Right  Left    Flexion 100 degrees 95 degrees    Internal Rotation 30 degrees  30 degrees    External Rotation  40 degrees  40 degrees       Lower Extremity Strength (MMT):    Right  Left    Hip Flexion 4-/5 3+/5   Hip Abduction  3+/5 3/5   Knee Flexion 4/5 4-/5   Knee Extension 4+/5 4/5   Dorsiflexion 4+/5 4-/5   Plantarflexion 4+/5 4-/5      Special Tests:  - AGMR: (+) L, (+) R  - CHERELLE: (+) L, (-) R   - FADIR: (-) L, (-) R   - Flexion/Extension bias: (-) both   - Supine to Sit: Anteriorly rotated innominate on the left  - Flick Assessment: hypomobile on the left   - SI Distraction: (+)  - SI Compression: (-)    Functional Tests:  - Single Leg Balance: Right = 4 sec, Left = 2 sec (noticeable hip drop and patient reported discomfort on the left  - Double Leg Squat: Noticeable weight shift to the right and quad dominant.   - Stair Assessment: Patient reported pain stepping up on left lower extremity.   --- Following SI manipulation relieved symptoms on the stairs and no pain with single limb stance.    FOTO Limitation Score: 42% (initial = 54%)    Treatment     Delphine received the treatments listed below:      therapeutic exercises to develop strength, endurance, ROM, flexibility, posture, and core stabilization for 00 minutes including:     manual therapy techniques: Joint mobilizations were applied to the: hip for 10 minutes, including:    Hip assessment   (L) Hip long axis distraction grade II-III  (L) hip mobility inferior glides grade II-III     Not Today:  SI MET for rotated innominate 3x6 sec  holds  SI 2 person manipulation grade V   - relieved symptoms and symptoms with stairs minimal      neuromuscular re-education activities to improve: Balance, Coordination, Kinesthetic, Sense, Proprioception, and Posture for 36 minutes. The following activities were included:    Assessment findings and return to gym activities with proper loading   Bridge with GreenTB around knees 2x10 reps with 3-5 sec holds   Bridge with GreenTB and B lat pulldown RedTB 2x8 reps   LAQ 2# 3x8 reps with 3 sec holds   Seated hip ER with RedTB 3x10 reps each   Lateral band walks with RedTB 3 laps each direction (~15 feet)  Knee extension machine DL 3x8 reps 3 sec up and 3 sec down 15#     Not Today:  Hip adduction with gait belt 10x10 sec holds   Hip abduction with ball 10x10 sec holds   PPT with BP cuff 2x10 reps with 10 sec holds   PPT with BP cuff and BKFO 1x10 reps each LE   Paloff press GreenTB 2x10 reps with 3 sec holds   Glute isometrics (sidelying clamshells) with GreenTB 3x45 sec with 2 min rest breaks between   Straight leg raise 2 x 10 bilateral      therapeutic activities to improve functional performance for 16 minutes, including:     Aerobic exercise NuStep 8' or 1000K steps for endogenous release of opioids  Double Leg Shuttle Press 3x10-12 reps with YellowTB to increase glute activation and monitor valgus 62.5# to help with getting up and down  Single Leg Shuttle Press 3x8 reps 37.5# to help with getting up and down    Not Today:  Treadmill walking 0.8 mph ensuring good form and no pain x3 min   - educated to try at the gym     gait training to improve functional mobility and safety for 00  minutes, including:    Patient Education and Home Exercises       Education provided:   - PT POC, Prognosis, and HEP     Written Home Exercises Provided: Patient instructed to cont prior HEP. Exercises were reviewed and Delphine was able to demonstrate them prior to the end of the session.  Delphine demonstrated good  understanding of  the education provided. See EMR under Patient Instructions for exercises provided during therapy sessions    Assessment     Delphine is continuing to progress well with physical therapy. She continues with weakness on the left side and continued progression with strengthening exercises to improve and progress with overall mobility. Continued education returning to gym activities ensuring not overloading her hip to not increase her symptoms. She was further challenged with glute and hip strengthening as well as core stability to help with gait and stair negotiation. Will continue to monitor and progress as able.     Delphine Is progressing well towards her goals.   Pt prognosis is Good.     Pt will continue to benefit from skilled outpatient physical therapy to address the deficits listed in the problem list box on initial evaluation, provide pt/family education and to maximize pt's level of independence in the home and community environment.     Pt's spiritual, cultural and educational needs considered and pt agreeable to plan of care and goals.     Anticipated Barriers for therapy: Scheduling, chronicity of symptoms, age     Goals: Short Term Goals: 4 weeks (Progressing, Not Met)  Patient will be independent in initial HEP to help supplement PT. - MET  Patient will improve lumbar range of motion to >/= 70% in all planes to help with ADLs.   Patient will improve hip flexion to >/= 110 degrees to help with gait mechanics.      Long Term Goals: 8 weeks (Progressing, Not Met)  Patient will be independent in updated HEP to help supplement PT and maintain gains made in PT.   Patient will improve FOTO limitation score to </= 37% to show improvement in condition.   Patient will improve hip strength by 1/3 MMT to help with ADLS.   Patient will be able to return to gym activities pain free.     Plan     Plan of care Certification: 6/5/2023 to 8/20/2023.     Continue with current plan of care with emphasis on hip mobility,  hip/glute strengthening, gait mechanics, and return to recreational activities.     Outpatient Physical Therapy 1-2 times weekly for 8 weeks to include the following interventions: Gait Training, Manual Therapy, Moist Heat/ Ice, Neuromuscular Re-ed, Patient Education, Self Care, Therapeutic Activities, and Therapeutic Exercise.     Nancy Sousa, PT, DPT, OCS

## 2023-07-26 ENCOUNTER — CLINICAL SUPPORT (OUTPATIENT)
Dept: REHABILITATION | Facility: HOSPITAL | Age: 78
End: 2023-07-26
Payer: MEDICARE

## 2023-07-26 DIAGNOSIS — R29.898 DECREASED STRENGTH OF LOWER EXTREMITY: Primary | ICD-10-CM

## 2023-07-26 PROCEDURE — 97140 MANUAL THERAPY 1/> REGIONS: CPT

## 2023-07-26 PROCEDURE — 97530 THERAPEUTIC ACTIVITIES: CPT

## 2023-07-26 PROCEDURE — 97112 NEUROMUSCULAR REEDUCATION: CPT

## 2023-08-02 ENCOUNTER — CLINICAL SUPPORT (OUTPATIENT)
Dept: REHABILITATION | Facility: HOSPITAL | Age: 78
End: 2023-08-02
Payer: MEDICARE

## 2023-08-02 DIAGNOSIS — R29.898 DECREASED STRENGTH OF LOWER EXTREMITY: Primary | ICD-10-CM

## 2023-08-02 PROCEDURE — 97112 NEUROMUSCULAR REEDUCATION: CPT

## 2023-08-02 PROCEDURE — 97140 MANUAL THERAPY 1/> REGIONS: CPT

## 2023-08-07 NOTE — PROGRESS NOTES
[facility]PAT[facility] THERAPY AND [facility]   Physical Therapy Daily Treatment Note      Name: Delphine Perdomo  Clinic Number: [id number]    Therapy Diagnosis:   Encounter Diagnosis   Name Primary?    Decreased strength of lower extremity Yes     Physician: Austyn Figueroa MD    Visit Date: 7/26/2023  Physician Orders: PT Eval and Treat   Medical Diagnosis from Referral:   M47.816 (ICD-10-CM) - Lumbar spondylosis   M43.10 (ICD-10-CM) - Spondylolisthesis, acquired   Evaluation Date: 6/5/2023  Authorization Period Expiration: 12/31/2023  Plan of Care Expiration: 8/20/2023  Progress Note Due: 8/10/2023  Visit # / Visits authorized: 5/20 + eval   FOTO: 1/3     Precautions: Standard and Parkinson's disease      PTA Visit #: 0/5     Time In: 8:58 am   Time Out: 9:59 am   Total Billable Time: 61 minutes (billable = 61 min)    Subjective     Pt reports: She is doing pretty good, can tell she is getting better. She is able to lay on her left side without pain. She is still unable to do the elliptical without pain and going down stairs is better but up is still difficult leading with the left.  She was compliant with home exercise program.  Response to previous treatment: Positive, independent in HEP   Functional change: Ongoing    Pain: 0/10  Location: left back and hip      Patients goals: To be able to return to her exercise program she was doing at the gym without pain and understanding what she can and can't to prevent her from injuring herself.     Objective      Objective Measures updated at progress report unless specified.     Observation 6/26/2023:  - Single leg stance on L positive for pain in posterior/lateral hip   - Step up on L pain reported in left hip posterior   - Pain with hip adduction on L   - CHERELLE: (+) on L for groin pain  - FADIR: (+) on L for groin and lateral hip pain   - Flick Assessment: Hypomobile on R compared to left   - Supine to sit: Right anteriorly rotated innominate     Following SI joint  correction improvement in symptoms with single limb stance and step ups. Following isometrics improvement in single limb stance and same as after SI correction no better or worse.     Observation 7/3/2023:  Range of Motion:  Lumbar    Percentage  Pain    Flexion 70% No Pain   Extension 50% No Pain   Right Sidebend  60% No Pain   Left Sidebend  60% No Pain   Right Rotation 75% No Pain   Left Rotation  75% No Pain      Hip     Right  Left    Flexion 100 degrees 95 degrees    Internal Rotation 30 degrees  30 degrees    External Rotation  40 degrees  40 degrees       Lower Extremity Strength (MMT):    Right  Left    Hip Flexion 4-/5 3+/5   Hip Abduction  3+/5 3/5   Knee Flexion 4/5 4-/5   Knee Extension 4+/5 4/5   Dorsiflexion 4+/5 4-/5   Plantarflexion 4+/5 4-/5      Special Tests:  - AGMR: (+) L, (+) R  - CHERELLE: (+) L, (-) R   - FADIR: (-) L, (-) R   - Flexion/Extension bias: (-) both   - Supine to Sit: Anteriorly rotated innominate on the left  - Flick Assessment: hypomobile on the left   - SI Distraction: (+)  - SI Compression: (-)    Functional Tests:  - Single Leg Balance: Right = 4 sec, Left = 2 sec (noticeable hip drop and patient reported discomfort on the left  - Double Leg Squat: Noticeable weight shift to the right and quad dominant.   - Stair Assessment: Patient reported pain stepping up on left lower extremity.   --- Following SI manipulation relieved symptoms on the stairs and no pain with single limb stance.    FOTO Limitation Score: 42% (initial = 54%)    Treatment     Delphine received the treatments listed below:      therapeutic exercises to develop strength, endurance, ROM, flexibility, posture, and core stabilization for 00 minutes including:     manual therapy techniques: Joint mobilizations were applied to the: hip for 10 minutes, including:    Hip assessment   (L) Hip long axis distraction grade II-III  (L) hip mobility inferior glides grade II-III     Not Today:  SI MET for rotated innominate 3x6  sec holds  SI 2 person manipulation grade V   - relieved symptoms and symptoms with stairs minimal      neuromuscular re-education activities to improve: Balance, Coordination, Kinesthetic, Sense, Proprioception, and Posture for 35 minutes. The following activities were included:    Assessment findings and return to gym activities with proper loading, slow and controlled movements   Bridge with BlueTB 10x10 sec holds   Bridge with BlueTB around knees and B lat pulldown RedTB 2x8 reps 3 sec holds   (L) Sidelying hip ER 2x10 reps 3 sec holds   (L) LAQ 5# 3 sec up and down 3x8 reps   Paloff press in 1/2 lunge on cables 10# 2x10 reps each   Step ups 4-inch with RedTB control ER 3x8 reps   Hip hikes on 2-inch 2x10 reps 5 sec holds      Not Today:  Hip adduction with gait belt 10x10 sec holds   Hip abduction with ball 10x10 sec holds   PPT with BP cuff 2x10 reps with 10 sec holds   PPT with BP cuff and BKFO 1x10 reps each LE   Paloff press GreenTB 2x10 reps with 3 sec holds   Glute isometrics (sidelying clamshells) with GreenTB 3x45 sec with 2 min rest breaks between   Lateral band walks with RedTB 3 laps each direction (~15 feet)  Knee extension machine DL 3x8 reps 3 sec up and 3 sec down 15#       therapeutic activities to improve functional performance for 16 minutes, including:     DL shuttle press 3x10 reps 82.5# emphasis on slow and controlled to help getting up and down   SL shuttle press 3x6-8 reps L 25#, R 50#  Omro carrys 8# KB with march 3 laps each direction     Not Today:  Aerobic exercise NuStep 8' or 1000K steps for endogenous release of opioids  Double Leg Shuttle Press 3x10-12 reps with YellowTB to increase glute activation and monitor valgus 62.5# to help with getting up and down  Single Leg Shuttle Press 3x8 reps 37.5# to help with getting up and down  Treadmill walking 0.8 mph ensuring good form and no pain x3 min   - educated to try at the gym     gait training to improve functional mobility and  safety for 00  minutes, including:    Patient Education and Home Exercises       Education provided:   - PT POC, Prognosis, and HEP     Written Home Exercises Provided: Patient instructed to cont prior HEP. Exercises were reviewed and Delphine was able to demonstrate them prior to the end of the session.  Delphine demonstrated good  understanding of the education provided. See EMR under Patient Instructions for exercises provided during therapy sessions    Assessment     Delphine tolerated therapy session well. She continues to respond well to manual therapy and utilized today to maximize mobility. Continued focus on core stability and maximizing lower extremity strengthening most significantly on left sided weakness. She benefits from cueing to emphasize slow and controlled motions not rushing sets and reps. Further emphasis on hip strengthening and working on stair negotiation as she continues with difficulty with stairs. She was adequately challenged with core stability and marching at today's session. Will continue to monitor and progress as able     Delphine Is progressing well towards her goals.   Pt prognosis is Good.     Pt will continue to benefit from skilled outpatient physical therapy to address the deficits listed in the problem list box on initial evaluation, provide pt/family education and to maximize pt's level of independence in the home and community environment.     Pt's spiritual, cultural and educational needs considered and pt agreeable to plan of care and goals.     Anticipated Barriers for therapy: Scheduling, chronicity of symptoms, age     Goals: Short Term Goals: 4 weeks (Progressing, Not Met)  Patient will be independent in initial HEP to help supplement PT. - MET  Patient will improve lumbar range of motion to >/= 70% in all planes to help with ADLs.   Patient will improve hip flexion to >/= 110 degrees to help with gait mechanics.      Long Term Goals: 8 weeks (Progressing, Not Met)  Patient  will be independent in updated HEP to help supplement PT and maintain gains made in PT.   Patient will improve FOTO limitation score to </= 37% to show improvement in condition.   Patient will improve hip strength by 1/3 MMT to help with ADLS.   Patient will be able to return to gym activities pain free.     Plan     Plan of care Certification: 6/5/2023 to 8/20/2023.     Continue with current plan of care with emphasis on hip mobility, hip/glute strengthening, gait mechanics, and return to recreational activities.     Outpatient Physical Therapy 1-2 times weekly for 8 weeks to include the following interventions: Gait Training, Manual Therapy, Moist Heat/ Ice, Neuromuscular Re-ed, Patient Education, Self Care, Therapeutic Activities, and Therapeutic Exercise.     Nancy Sousa, PT, DPT, OCS

## 2023-08-13 NOTE — PROGRESS NOTES
[facility]   Physical Therapy Daily Treatment Note      Name: Delphine Perdomo  Clinic Number: [id number]    Therapy Diagnosis:   Encounter Diagnosis   Name Primary?    Decreased strength of lower extremity Yes     Physician: Austyn Figueroa MD    Visit Date: 8/2/2023  Physician Orders: PT Eval and Treat   Medical Diagnosis from Referral:   M47.816 (ICD-10-CM) - Lumbar spondylosis   M43.10 (ICD-10-CM) - Spondylolisthesis, acquired   Evaluation Date: 6/5/2023  Authorization Period Expiration: 12/31/2023  Plan of Care Expiration: 8/20/2023  Progress Note Due: 8/10/2023  Visit # / Visits authorized: 8/20 + eval   FOTO: 1/3     Precautions: Standard and Parkinson's disease      PTA Visit #: 0/5     Time In: 9:58 am   Time Out: 10:55 am   Total Billable Time: 57 minutes (billable = 30 min)    Subjective     Pt reports: She is doing pretty good. Therapy has really been helping. The only thing that she still has difficulty with is stairs and the elliptical but has been working on slowly ramping up time on the elliptical. She is going out of town to visit family for the next couple of weeks but she plans to keep up with her exercises while she is gone.   She was compliant with home exercise program.  Response to previous treatment: Positive, independent in HEP   Functional change: Ongoing    Pain: 0/10  Location: left back and hip      Patients goals: To be able to return to her exercise program she was doing at the gym without pain and understanding what she can and can't to prevent her from injuring herself.     Objective      Objective Measures updated at progress report unless specified.     Observation 7/3/2023:  Range of Motion:  Lumbar    Percentage  Pain    Flexion 70% No Pain   Extension 50% No Pain   Right Sidebend  60% No Pain   Left Sidebend  60% No Pain   Right Rotation 75% No Pain   Left Rotation  75% No Pain      Hip     Right  Left    Flexion 100 degrees 95 degrees    Internal  Rotation 30 degrees  30 degrees    External Rotation  40 degrees  40 degrees       Lower Extremity Strength (MMT):    Right  Left    Hip Flexion 4-/5 3+/5   Hip Abduction  3+/5 3/5   Knee Flexion 4/5 4-/5   Knee Extension 4+/5 4/5   Dorsiflexion 4+/5 4-/5   Plantarflexion 4+/5 4-/5      Special Tests:  - AGMR: (+) L, (+) R  - CHERELLE: (+) L, (-) R   - FADIR: (-) L, (-) R   - Flexion/Extension bias: (-) both   - Supine to Sit: Anteriorly rotated innominate on the left  - Flick Assessment: hypomobile on the left   - SI Distraction: (+)  - SI Compression: (-)    Functional Tests:  - Single Leg Balance: Right = 4 sec, Left = 2 sec (noticeable hip drop and patient reported discomfort on the left  - Double Leg Squat: Noticeable weight shift to the right and quad dominant.   - Stair Assessment: Patient reported pain stepping up on left lower extremity.   --- Following SI manipulation relieved symptoms on the stairs and no pain with single limb stance.    FOTO Limitation Score: 42% (initial = 54%)    Treatment     Delphine received the treatments listed below:      therapeutic exercises to develop strength, endurance, ROM, flexibility, posture, and core stabilization for 00 minutes including:     manual therapy techniques: Joint mobilizations were applied to the: hip for 10 minutes, including:    Hip assessment   (L) Hip long axis distraction grade II-III  (L) Hip mobility inferior glides grade II-III     Not Today:  SI MET for rotated innominate 3x6 sec holds  SI 2 person manipulation grade V   - relieved symptoms and symptoms with stairs minimal      neuromuscular re-education activities to improve: Balance, Coordination, Kinesthetic, Sense, Proprioception, and Posture for 32 minutes. The following activities were included:    Assessment findings and return to gym activities with proper loading, slow and controlled movements   Bridge with BlueTB 10x10 sec holds   Sidelying clamshells 2x10 reps with RedTB and 5 sec holds    Paloff press in 1/2 lunge on cables 10# 2x10 reps each   (L) LAQ 5# 3 sec up and down 3x8 reps   Step ups on 4-inch with opposite lat pulldown on cables 7# 2x8 reps each   Hip hikes on 2-inch 1x10 reps 10 sec holds     Not Today:  Glute isometrics (sidelying clamshells) with GreenTB 3x45 sec with 2 min rest breaks between   Lateral band walks with RedTB 3 laps each direction (~15 feet)  Knee extension machine DL 3x8 reps 3 sec up and 3 sec down 15#   Bridge with BlueTB around knees and B lat pulldown RedTB 2x8 reps 3 sec holds   (L) Sidelying hip ER 2x10 reps 3 sec holds  Step ups 4-inch with RedTB control ER 3x8 reps     therapeutic activities to improve functional performance for 15 minutes, including:     DL shuttle press 3x10 reps 82.5# emphasis on slow and controlled to help getting up and down   SL shuttle press 3x6-8 reps L 25#, R 50#  [place] carrys 8# KB with march 3 laps each direction     Not Today:  Aerobic exercise NuStep 8' or 1000K steps for endogenous release of opioids      gait training to improve functional mobility and safety for 00  minutes, including:    Patient Education and Home Exercises       Education provided:   - PT POC, Prognosis, and HEP     Written Home Exercises Provided: Patient instructed to cont prior HEP. Exercises were reviewed and Delphine was able to demonstrate them prior to the end of the session.  Delphine demonstrated good  understanding of the education provided. See EMR under Patient Instructions for exercises provided during therapy sessions    Assessment     Delphine is continuing to progress well with physical therapy with improvement in symptoms and mobility. She continues with significant weakness in her left lower extremity and continued progression with strengthening at today's session. Further core stability training added as well with good response and challenge. She continues to benefit from cueing for controlled movements with good carryover following last  session. Would continue to benefit from progression with stair training and further glute hip/strengthening as well. She was provided with updated HEP end of today's session to continue to work on while on vacation. Will continue to monitor and progress as able.     Delphine Is progressing well towards her goals.   Pt prognosis is Good.     Pt will continue to benefit from skilled outpatient physical therapy to address the deficits listed in the problem list box on initial evaluation, provide pt/family education and to maximize pt's level of independence in the home and community environment.     Pt's spiritual, cultural and educational needs considered and pt agreeable to plan of care and goals.     Anticipated Barriers for therapy: Scheduling, chronicity of symptoms, age     Goals: Short Term Goals: 4 weeks (Progressing, Not Met)  Patient will be independent in initial HEP to help supplement PT. - MET  Patient will improve lumbar range of motion to >/= 70% in all planes to help with ADLs.   Patient will improve hip flexion to >/= 110 degrees to help with gait mechanics.      Long Term Goals: 8 weeks (Progressing, Not Met)  Patient will be independent in updated HEP to help supplement PT and maintain gains made in PT.   Patient will improve FOTO limitation score to </= 37% to show improvement in condition.   Patient will improve hip strength by 1/3 MMT to help with ADLS.   Patient will be able to return to gym activities pain free.     Plan     Plan of care Certification: 6/5/2023 to 8/20/2023.     Continue with current plan of care with emphasis on hip mobility, hip/glute strengthening, gait mechanics, and return to recreational activities.     Outpatient Physical Therapy 1-2 times weekly for 8 weeks to include the following interventions: Gait Training, Manual Therapy, Moist Heat/ Ice, Neuromuscular Re-ed, Patient Education, Self Care, Therapeutic Activities, and Therapeutic Exercise.     Nancy Sousa, PT, DPT,  OCS

## 2023-08-28 ENCOUNTER — CLINICAL SUPPORT (OUTPATIENT)
Dept: REHABILITATION | Facility: HOSPITAL | Age: 78
End: 2023-08-28
Payer: MEDICARE

## 2023-08-28 DIAGNOSIS — R29.898 DECREASED STRENGTH OF LOWER EXTREMITY: Primary | ICD-10-CM

## 2023-08-28 PROCEDURE — 97112 NEUROMUSCULAR REEDUCATION: CPT

## 2023-08-28 PROCEDURE — 97140 MANUAL THERAPY 1/> REGIONS: CPT

## 2023-08-28 NOTE — PROGRESS NOTES
[facility]   Physical Therapy Re-Assessment / Treatment Note   And Updated Plan of Care      Name: Delphine Perdomo  Clinic Number: [id number]    Therapy Diagnosis:   Encounter Diagnosis   Name Primary?    Decreased strength of lower extremity Yes     Physician: Austyn Figueroa MD    Visit Date: 8/28/2023  Physician Orders: PT Eval and Treat   Medical Diagnosis from Referral:   M47.816 (ICD-10-CM) - Lumbar spondylosis   M43.10 (ICD-10-CM) - Spondylolisthesis, acquired   Evaluation Date: 6/5/2023  Authorization Period Expiration: 12/31/2023  Plan of Care Expiration: 8/20/2023 Updated to 10/15/2023  Progress Note Due: 10/10/2023  Most Recent: 8/28/2023  Visit # / Visits authorized: 9/20 + eval   FOTO: 3/3     Precautions: Standard and Parkinson's disease      PTA Visit #: 0/5     Time In: 9:58 am   Time Out: 10:55 am   Total Billable Time: 57 minutes (billable = 30 min)    Subjective     Pt reports: She is really happy with her progress feeling 85% better than when starting physical therapy. She felt really good while on vacation with able to hike and walk. She kept up with her exercises while she was out of town and has paid attention to ensuring slow and controlled and not overdoing it.   She was compliant with home exercise program.  Response to previous treatment: Positive, independent in HEP   Functional change: Ongoing    Pain: 0/10  Location: left back and hip      Patients goals: To be able to return to her exercise program she was doing at the gym without pain and understanding what she can and can't to prevent her from injuring herself.     Objective      Objective Measures updated at progress report unless specified.     Observation 8/28/2023:  Range of Motion:  Lumbar    Percentage  Pain    Flexion 80% No Pain   Extension 75% No Pain   Right Sidebend  75% No Pain   Left Sidebend  75% No Pain   Right Rotation 90% No Pain   Left Rotation  90% No Pain      Hip     Right  Left     Flexion 110 degrees 100 degrees    Internal Rotation 30 degrees  30 degrees    External Rotation  40 degrees  40 degrees       Lower Extremity Strength (MMT):    Right  Left    Hip Flexion 4/5 3+/5   Hip Abduction  4-/5 3+/5   Knee Flexion 4/5 4-/5   Knee Extension 4+/5 4/5   Dorsiflexion 4+/5 4/5   Plantarflexion 4+/5 4/5      Special Tests:  - AGMR: (+) L, (+) R  - CHERELLE: (-) L, (-) R   - FADIR: (-) L, (-) R   - Flexion/Extension bias: (-) both   - SI Distraction: (-)  - SI Compression: (-)    FOTO Intake Score: 67 (initial = 46)     Treatment     Delphine received the treatments listed below:      therapeutic exercises to develop strength, endurance, ROM, flexibility, posture, and core stabilization for 00 minutes including:     manual therapy techniques: Joint mobilizations were applied to the: hip for 10 minutes, including:    Hip assessment   (L) Hip long axis distraction grade II-III  (L) Hip mobility inferior glides grade II-III     Not Today:  SI MET for rotated innominate 3x6 sec holds  SI 2 person manipulation grade V   - relieved symptoms and symptoms with stairs minimal      neuromuscular re-education activities to improve: Balance, Coordination, Kinesthetic, Sense, Proprioception, and Posture for 41 minutes. The following activities were included:    Assessment findings and return to gym activities with proper loading, slow and controlled movements   Bridge with BlueTB 10x10 sec holds   Sidelying clamshells GreenTB 2x10 reps 5 sec holds   (B) LAQ 5# L, 7.5# R 3x8 reps 3 sec holds   [place] kicks on shuttle no straps 10# weight 2x8 reps each    Step ups on 4-inch with opposite lat pulldown cables 10# 3x8 reps each   Modified standing middle trap holds 2x10-12 reps 3 sec holds     Not Today:  Paloff press in 1/2 lunge on cables 10# 2x10 reps each   Hip hikes on 2-inch 1x10 reps 10 sec holds   Glute isometrics (sidelying clamshells) with GreenTB 3x45 sec with 2 min rest breaks between   Lateral band walks  with RedTB 3 laps each direction (~15 feet)  Knee extension machine DL 3x8 reps 3 sec up and 3 sec down 15#   (L) Sidelying hip ER 2x10 reps 3 sec holds  Step ups 4-inch with RedTB control ER 3x8 reps     therapeutic activities to improve functional performance for 06 minutes, including:     [place] carrys 12# KB with march 3 laps each direction     Not Today:  Aerobic exercise NuStep 8' or 1000K steps for endogenous release of opioids  DL shuttle press 3x10 reps 82.5# emphasis on slow and controlled to help getting up and down   SL shuttle press 3x6-8 reps L 25#, R 50#    gait training to improve functional mobility and safety for 00  minutes, including:    Patient Education and Home Exercises       Education provided:   - PT POC, Prognosis, and HEP     Written Home Exercises Provided: Patient instructed to cont prior HEP. Exercises were reviewed and Delphine was able to demonstrate them prior to the end of the session.  Delphine demonstrated good  understanding of the education provided. See EMR under Patient Instructions for exercises provided during therapy sessions    Assessment     Delphine has been seen for a total of 9 visits plus initial evaluation for a medical diagnosis of M47.816 (ICD-10-CM) - Lumbar spondylosis and M43.10 (ICD-10-CM) - Spondylolisthesis, acquired. She has shown significant improvement in her range of motion, strength, and functional mobility with improved FOTO score since beginning physical therapy. She continues with limitations in strength most notably in left hip and hamstrings limiting full return to prior level of function. She continues with some pain on elliptical and treadmill but able to tolerate increased time than previously. Discussed with patient's progress dropping frequency and continuing with HEP to which patient agreed. She continues to be challenged with further strengthening and core stability exercises with good tolerance. Will continue to monitor and progress as able.      Delphine Is progressing well towards her goals.   Pt prognosis is Good.     Pt will continue to benefit from skilled outpatient physical therapy to address the deficits listed in the problem list box on initial evaluation, provide pt/family education and to maximize pt's level of independence in the home and community environment.     Pt's spiritual, cultural and educational needs considered and pt agreeable to plan of care and goals.     Anticipated Barriers for therapy: Scheduling, chronicity of symptoms, age     Goals: Short Term Goals: 4 weeks (Progressing, Not Met)  Patient will be independent in initial HEP to help supplement PT. - MET  Patient will improve lumbar range of motion to >/= 70% in all planes to help with ADLs. - MET  Patient will improve hip flexion to >/= 110 degrees to help with gait mechanics. - MET on R, Not Met on L      Long Term Goals: 8 weeks (Progressing, Not Met)  Patient will be independent in updated HEP to help supplement PT and maintain gains made in PT. - MET  Patient will improve FOTO limitation score to </= 37% to show improvement in condition. - MET  Patient will improve hip strength by 1/3 MMT to help with ADLS.   Patient will be able to return to gym activities pain free.     Plan     Plan of care Certification: 6/5/2023 to 8/20/2023. Updated to 10/15/2023     Continue with current plan of care with emphasis on hip mobility, hip/glute strengthening, gait mechanics, and return to recreational activities.     Outpatient Physical Therapy 1 time every other week to every 2 weeks  for 8 weeks to include the following interventions: Gait Training, Manual Therapy, Moist Heat/ Ice, Neuromuscular Re-ed, Patient Education, Self Care, Therapeutic Activities, and Therapeutic Exercise.     Nancy Sousa, PT, DPT, OCS

## 2023-09-05 ENCOUNTER — OFFICE VISIT (OUTPATIENT)
Dept: NEUROLOGY | Facility: CLINIC | Age: 78
End: 2023-09-05
Payer: MEDICARE

## 2023-09-05 VITALS
BODY MASS INDEX: 30.36 KG/M2 | HEART RATE: 81 BPM | SYSTOLIC BLOOD PRESSURE: 133 MMHG | WEIGHT: 165 LBS | HEIGHT: 62 IN | DIASTOLIC BLOOD PRESSURE: 76 MMHG

## 2023-09-05 DIAGNOSIS — G25.81 RESTLESS LEG SYNDROME: ICD-10-CM

## 2023-09-05 DIAGNOSIS — Z71.89 COUNSELING REGARDING GOALS OF CARE: ICD-10-CM

## 2023-09-05 DIAGNOSIS — G20.A1 PD (PARKINSON'S DISEASE): Primary | ICD-10-CM

## 2023-09-05 PROCEDURE — 1159F PR MEDICATION LIST DOCUMENTED IN MEDICAL RECORD: ICD-10-PCS | Mod: HCNC,CPTII,S$GLB, | Performed by: PHYSICIAN ASSISTANT

## 2023-09-05 PROCEDURE — 1101F PR PT FALLS ASSESS DOC 0-1 FALLS W/OUT INJ PAST YR: ICD-10-PCS | Mod: HCNC,CPTII,S$GLB, | Performed by: PHYSICIAN ASSISTANT

## 2023-09-05 PROCEDURE — 99214 PR OFFICE/OUTPT VISIT, EST, LEVL IV, 30-39 MIN: ICD-10-PCS | Mod: HCNC,S$GLB,, | Performed by: PHYSICIAN ASSISTANT

## 2023-09-05 PROCEDURE — 3075F PR MOST RECENT SYSTOLIC BLOOD PRESS GE 130-139MM HG: ICD-10-PCS | Mod: HCNC,CPTII,S$GLB, | Performed by: PHYSICIAN ASSISTANT

## 2023-09-05 PROCEDURE — 1101F PT FALLS ASSESS-DOCD LE1/YR: CPT | Mod: HCNC,CPTII,S$GLB, | Performed by: PHYSICIAN ASSISTANT

## 2023-09-05 PROCEDURE — 3078F PR MOST RECENT DIASTOLIC BLOOD PRESSURE < 80 MM HG: ICD-10-PCS | Mod: HCNC,CPTII,S$GLB, | Performed by: PHYSICIAN ASSISTANT

## 2023-09-05 PROCEDURE — 1126F AMNT PAIN NOTED NONE PRSNT: CPT | Mod: HCNC,CPTII,S$GLB, | Performed by: PHYSICIAN ASSISTANT

## 2023-09-05 PROCEDURE — 1159F MED LIST DOCD IN RCRD: CPT | Mod: HCNC,CPTII,S$GLB, | Performed by: PHYSICIAN ASSISTANT

## 2023-09-05 PROCEDURE — 1160F RVW MEDS BY RX/DR IN RCRD: CPT | Mod: HCNC,CPTII,S$GLB, | Performed by: PHYSICIAN ASSISTANT

## 2023-09-05 PROCEDURE — 3075F SYST BP GE 130 - 139MM HG: CPT | Mod: HCNC,CPTII,S$GLB, | Performed by: PHYSICIAN ASSISTANT

## 2023-09-05 PROCEDURE — 1160F PR REVIEW ALL MEDS BY PRESCRIBER/CLIN PHARMACIST DOCUMENTED: ICD-10-PCS | Mod: HCNC,CPTII,S$GLB, | Performed by: PHYSICIAN ASSISTANT

## 2023-09-05 PROCEDURE — 3078F DIAST BP <80 MM HG: CPT | Mod: HCNC,CPTII,S$GLB, | Performed by: PHYSICIAN ASSISTANT

## 2023-09-05 PROCEDURE — 3288F FALL RISK ASSESSMENT DOCD: CPT | Mod: HCNC,CPTII,S$GLB, | Performed by: PHYSICIAN ASSISTANT

## 2023-09-05 PROCEDURE — 99999 PR PBB SHADOW E&M-EST. PATIENT-LVL III: CPT | Mod: PBBFAC,HCNC,, | Performed by: PHYSICIAN ASSISTANT

## 2023-09-05 PROCEDURE — 1126F PR PAIN SEVERITY QUANTIFIED, NO PAIN PRESENT: ICD-10-PCS | Mod: HCNC,CPTII,S$GLB, | Performed by: PHYSICIAN ASSISTANT

## 2023-09-05 PROCEDURE — 99214 OFFICE O/P EST MOD 30 MIN: CPT | Mod: HCNC,S$GLB,, | Performed by: PHYSICIAN ASSISTANT

## 2023-09-05 PROCEDURE — 3288F PR FALLS RISK ASSESSMENT DOCUMENTED: ICD-10-PCS | Mod: HCNC,CPTII,S$GLB, | Performed by: PHYSICIAN ASSISTANT

## 2023-09-05 PROCEDURE — 99999 PR PBB SHADOW E&M-EST. PATIENT-LVL III: ICD-10-PCS | Mod: PBBFAC,HCNC,, | Performed by: PHYSICIAN ASSISTANT

## 2023-09-05 NOTE — PROGRESS NOTES
Name: Delphine Perdomo  MRN: [id number]   CSN: [id number]      Date: 09/05/2023    Referring physician:  No referring provider defined for this encounter.    Chief Complaint: tremor     Interval History:  - recent trip to [facility]   - accompanied by spouse   - went to the ED, hurt her hip   - lumbar issues   - doing PT   - going to the gym, exercising   - back pain and hip much better now   - saw Dr. Figueroa   - taking 1 tab TID   - no falls   -  notices tremor more than she does   - she feels she is stable   - some cramping in her toes in the evenings, has to get up and walk   - takes a multivitamin daily, contains iron         From Feb 2023  - trial of lodosyn with each dose last visit   - accompanied by spouse   - no falls   - started eating a little something with cd/ld and nausea subsided   - did not fill lodosyn   - exercising less, had an incident where she got leg stuck in pant leg and pulled muscle   - improving and planning on exercising more   - last dose was at noon       From Aug 2022  - has had hair loss since starting ldopa, just thinning, not clumps  - is better on the ldopa in general, tremor better and no falls  - is more conscious of her left foot while walking  - had successful trips to [place] to see grandson play baseball, also a trip to [place]  - enjoys walking around [place] for exercise  - some nausea from taking the ldopa on an empty stomach  - stretches and works on posture       From Nov 2021  - ldopa trial last visit   - accompanied by    - has noticed some improvement in tremors   - also has noticed improvement in her balance and able to go down stairs more easily   -  has noticed improvement in tremor   - when she first started taking it, caused nausea  - feels better whenever she eats protein with her medicine   - no falls   -  has noticed improvement in shuffling   - she has noticed that she doesn't swing her L arm as much   - still going to the  gym   - denies hallucinations        From Sept 2021: Delphine Perdomo is a R HANDED 77 y.o. female with a medical issues significant for cervical DDD with myelopathy s/p surgery in 2011 who presents for tremor. Accompanied by . Tremor started a year ago. Noticed it first in the left hand. About two years ago started feeling some gait instability. Didn't feel as stable on her bike as she had prior. Notices the tremor mostly at rest. Some L leg tremor as well. No tremor on the R side. Sometimes L hand tremor wakes her up. 3-4 falls in the past month, didn't step up high enough on the stairs. Fell trying to get out of bed. Denies feeling lightheaded or dizzy with falls.  has noticed some shuffling of her feet. Having trouble putting on necklaces.    No improvement of tremor with EtOH.  has noticed some masked facies. States that she looks more serious.   Goes to the gym and gets on the treadmill and bike.   Sleeps 8-9 hours a night, takes a nap during the day.     Family History: grandmother had tremors      Neuroleptic Exposure: none     Nonmotor/Premotor ROS:  Anosmia: poor sense of smell -- attributes to sinus issues   Dysarthria/Hypophonia: normal   Dysphagia/Sialorrhea: none   Depression:  has noticed she is more tired lately   Cognitive slowing: good,  agrees   Hallucinations: none   Urinary changes: frequency, nocturia   Constipation: one BM every couple of days   Orthostasis: none   Falls: as above   Freezing: none   Micrographia: not that she has noticed   Sleep issues:  -MESSI: none   -RBD: none       Review of Systems:   Review of Systems   Constitutional:  Negative for chills, fever and malaise/fatigue.   HENT:  Negative for hearing loss.    Eyes:  Negative for blurred vision and double vision.   Respiratory:  Negative for cough, shortness of breath and stridor.    Cardiovascular:  Negative for chest pain and leg swelling.   Gastrointestinal:  Negative for constipation,  diarrhea and nausea.   Genitourinary:  Positive for frequency. Negative for urgency.   Musculoskeletal:  Positive for falls.   Skin:  Negative for itching and rash.   Neurological:  Positive for tremors. Negative for dizziness, loss of consciousness and weakness.   Psychiatric/Behavioral:  Negative for hallucinations and memory loss.            Past Medical History: The patient  has a past medical history of Degenerative disc disease, Dislocation, Glaucoma, Lichen sclerosus, and Urge incontinence.    Social History: The patient  reports that she has never smoked. She has never used smokeless tobacco. She reports current alcohol use. She reports that she does not use drugs.    Family History: Their family history includes Cancer in her father; Diabetes in her brother; Hypertension in her mother and sister; Hypoglycemic in her mother.    Allergies: Codeine, Morphine, and Sulfa (sulfonamide antibiotics)     Meds:   Current Outpatient Medications on File Prior to Visit   Medication Sig Dispense Refill    acetaminophen (TYLENOL) 500 MG tablet Take 2 tablets (1,000 mg total) by mouth 2 (two) times daily as needed for Pain. 40 tablet 0    amLODIPine (NORVASC) 5 MG tablet Take 1 tablet (5 mg total) by mouth once daily. 90 tablet 4    ascorbic acid, vitamin C, (VITAMIN C) 100 MG tablet Take 100 mg by mouth once daily.      carbidopa-levodopa  mg (SINEMET)  mg per tablet TAKE 1 TABLET BY MOUTH THREE TIMES A  tablet 3    latanoprost 0.005 % ophthalmic solution Place one drop in right eye every night      LIDOcaine (LIDODERM) 5 % Place 1 patch onto the skin once daily. Remove & Discard patch within 12 hours or as directed by MD 15 patch 2    meloxicam (MOBIC) 15 MG tablet TAKE 1 TABLET BY MOUTH EVERY DAY 30 tablet 0    multivitamin capsule Take 1 capsule by mouth once daily.      [DISCONTINUED] carbidopa (LODOSYN) 25 mg tablet Take 1 tablet (25 mg total) by mouth 3 (three) times daily. 90 tablet 3     No  "current facility-administered medications on file prior to visit.       Exam:  /76   Pulse 81   Ht 5' 2" (1.575 m)   Wt 74.8 kg (165 lb)   BMI 30.18 kg/m²     Constitutional  Well-developed, well-nourished, appears stated age   Ophthalmoscopic  No papilledema with no hemorrhages or exudates bilaterally   Cardiovascular  Radial pulses 2+ and symmetric, no LE edema bilaterally   Neurological    * Mental status  MOCA =      - Orientation  Oriented to person, place, time, and situation     - Memory   Intact recent and remote     - Attention/concentration  Attentive, vigilant during exam     - Language  Naming & repetition intact, +2-step commands     - Fund of knowledge  Aware of current events     - Executive  Well-organized thoughts     - Other     * Cranial nerves       - CN II  PERRL, visual fields full to confrontation     - CN III, IV, VI  Extraocular movements full, normal pursuits and saccades     - CN V  Sensation V1 - V3 intact     - CN VII  Face strong and symmetric bilaterally     - CN VIII  Hearing intact bilaterally     - CN IX, X  Palate raises midline and symmetric     - CN XI  SCM and trapezius 5/5 bilaterally     - CN XII  Tongue midline   * Motor  Muscle bulk normal, strength 5/5 throughout   * Sensory   Intact to light touch    * Coordination  No dysmetria with finger-to-nose or heel-to-shin   * Gait  See below.   * Deep tendon reflexes  3+ and symmetric throughout   Babinski downgoing bilaterally   * Specialized movement exam  No hypophonic speech, hyperphonic, pleasant and animated    mild facial masking.   L cogwheel rigidity   L bradykinesia.   No other dystonia, chorea, athetosis, myoclonus, or tics.   No motor impersistence.   Normal-based gait.   No shortened stride length.    No postural instability.      resting tremor of L hand -- pill-rolling    L re-emergent tremor   L leg tremor with contralateral activation    decreased L arm swing     retropulsion on exam today  "     Laboratory/Radiological:  - Results:  No visits with results within 3 Month(s) from this visit.   Latest known visit with results is:   Lab Visit on 01/17/2023   Component Date Value Ref Range Status    TSH 01/17/2023 3.182  0.400 - 4.000 uIU/mL Final    Total Protein 01/17/2023 7.3  6.0 - 8.4 g/dL Final    Albumin 01/17/2023 4.0  3.5 - 5.2 g/dL Final    Total Bilirubin 01/17/2023 0.5  0.1 - 1.0 mg/dL Final    Bilirubin, Direct 01/17/2023 0.2  0.1 - 0.3 mg/dL Final    AST 01/17/2023 18  10 - 40 U/L Final    ALT 01/17/2023 7 (L)  10 - 44 U/L Final    Alkaline Phosphatase 01/17/2023 73  55 - 135 U/L Final    WBC 01/17/2023 4.70  3.90 - 12.70 K/uL Final    RBC 01/17/2023 4.48  4.00 - 5.40 M/uL Final    Hemoglobin 01/17/2023 13.9  12.0 - 16.0 g/dL Final    Hematocrit 01/17/2023 43.7  37.0 - 48.5 % Final    MCV 01/17/2023 98  82 - 98 fL Final    MCH 01/17/2023 31.0  27.0 - 31.0 pg Final    MCHC 01/17/2023 31.8 (L)  32.0 - 36.0 g/dL Final    RDW 01/17/2023 12.1  11.5 - 14.5 % Final    Platelets 01/17/2023 260  150 - 450 K/uL Final    MPV 01/17/2023 10.1  9.2 - 12.9 fL Final    Immature Granulocytes 01/17/2023 0.4  0.0 - 0.5 % Final    Gran # (ANC) 01/17/2023 2.0  1.8 - 7.7 K/uL Final    Immature Grans (Abs) 01/17/2023 0.02  0.00 - 0.04 K/uL Final    Lymph # 01/17/2023 2.2  1.0 - 4.8 K/uL Final    Mono # 01/17/2023 0.3  0.3 - 1.0 K/uL Final    Eos # 01/17/2023 0.1  0.0 - 0.5 K/uL Final    Baso # 01/17/2023 0.06  0.00 - 0.20 K/uL Final    nRBC 01/17/2023 0  0 /100 WBC Final    Gran % 01/17/2023 42.8  38.0 - 73.0 % Final    Lymph % 01/17/2023 46.4  18.0 - 48.0 % Final    Mono % 01/17/2023 6.8  4.0 - 15.0 % Final    Eosinophil % 01/17/2023 2.3  0.0 - 8.0 % Final    Basophil % 01/17/2023 1.3  0.0 - 1.9 % Final    Differential Method 01/17/2023 Automated   Final    Sodium 01/17/2023 140  136 - 145 mmol/L Final    Potassium 01/17/2023 4.1  3.5 - 5.1 mmol/L Final    Chloride 01/17/2023 101  95 - 110 mmol/L Final    CO2  01/17/2023 27  23 - 29 mmol/L Final    Glucose 01/17/2023 96  70 - 110 mg/dL Final    BUN 01/17/2023 19  8 - 23 mg/dL Final    Creatinine 01/17/2023 0.8  0.5 - 1.4 mg/dL Final    Calcium 01/17/2023 9.6  8.7 - 10.5 mg/dL Final    Anion Gap 01/17/2023 12  8 - 16 mmol/L Final    eGFR 01/17/2023 >60.0  >60 mL/min/1.73 m^2 Final       - Independent review of images:       - Independent review of consultant's notes: Henry          Assessment//Plan:   Parkinson's Disease  L-sided, likely typical PD   L pill rolling tremor, L bradykinesia, L cogwheel rigidity   Decreased L arm swing  Continue cd/ld 1 tab TID  Consider addition of rasagiline next visit     2. RLS  - takes multivitamin that contains iron   - discussed trial of low dose gabapentin, hold off       F/u with DJ in 6 months        Collaborating Physician, Dr. Gordillo, was available during today's encounter. Any change to plan along with cosign to appear in the EMR.       I spent 31 minutes with the patient, reviewing past encounters, labs and imaging.        Arlyn Baca PA-C   [facility]  Department of Neurology  Movement Disorders

## 2023-09-17 NOTE — PLAN OF CARE
[facility]   Physical Therapy Re-Assessment / Treatment Note   And Updated Plan of Care      Name: Delphine Perdomo  Clinic Number: [id number]    Therapy Diagnosis:   Encounter Diagnosis   Name Primary?    Decreased strength of lower extremity Yes     Physician: Austyn Figueroa MD    Visit Date: 8/28/2023  Physician Orders: PT Eval and Treat   Medical Diagnosis from Referral:   M47.816 (ICD-10-CM) - Lumbar spondylosis   M43.10 (ICD-10-CM) - Spondylolisthesis, acquired   Evaluation Date: 6/5/2023  Authorization Period Expiration: 12/31/2023  Plan of Care Expiration: 8/20/2023 Updated to 10/15/2023  Progress Note Due: 10/10/2023  Most Recent: 8/28/2023  Visit # / Visits authorized: 9/20 + eval   FOTO: 3/3     Precautions: Standard and Parkinson's disease      PTA Visit #: 0/5     Time In: 9:58 am   Time Out: 10:55 am   Total Billable Time: 57 minutes (billable = 30 min)    Subjective     Pt reports: She is really happy with her progress feeling 85% better than when starting physical therapy. She felt really good while on vacation with able to hike and walk. She kept up with her exercises while she was out of town and has paid attention to ensuring slow and controlled and not overdoing it.   She was compliant with home exercise program.  Response to previous treatment: Positive, independent in HEP   Functional change: Ongoing    Pain: 0/10  Location: left back and hip      Patients goals: To be able to return to her exercise program she was doing at the gym without pain and understanding what she can and can't to prevent her from injuring herself.     Objective      Objective Measures updated at progress report unless specified.     Observation 8/28/2023:  Range of Motion:  Lumbar    Percentage  Pain    Flexion 80% No Pain   Extension 75% No Pain   Right Sidebend  75% No Pain   Left Sidebend  75% No Pain   Right Rotation 90% No Pain   Left Rotation  90% No Pain      Hip     Right  Left     Flexion 110 degrees 100 degrees    Internal Rotation 30 degrees  30 degrees    External Rotation  40 degrees  40 degrees       Lower Extremity Strength (MMT):    Right  Left    Hip Flexion 4/5 3+/5   Hip Abduction  4-/5 3+/5   Knee Flexion 4/5 4-/5   Knee Extension 4+/5 4/5   Dorsiflexion 4+/5 4/5   Plantarflexion 4+/5 4/5      Special Tests:  - AGMR: (+) L, (+) R  - CHERELLE: (-) L, (-) R   - FADIR: (-) L, (-) R   - Flexion/Extension bias: (-) both   - SI Distraction: (-)  - SI Compression: (-)    FOTO Intake Score: 67 (initial = 46)     Treatment     Delphine received the treatments listed below:      therapeutic exercises to develop strength, endurance, ROM, flexibility, posture, and core stabilization for 00 minutes including:     manual therapy techniques: Joint mobilizations were applied to the: hip for 10 minutes, including:    Hip assessment   (L) Hip long axis distraction grade II-III  (L) Hip mobility inferior glides grade II-III     Not Today:  SI MET for rotated innominate 3x6 sec holds  SI 2 person manipulation grade V   - relieved symptoms and symptoms with stairs minimal      neuromuscular re-education activities to improve: Balance, Coordination, Kinesthetic, Sense, Proprioception, and Posture for 41 minutes. The following activities were included:    Assessment findings and return to gym activities with proper loading, slow and controlled movements   Bridge with BlueTB 10x10 sec holds   Sidelying clamshells GreenTB 2x10 reps 5 sec holds   (B) LAQ 5# L, 7.5# R 3x8 reps 3 sec holds   Donkey kicks on shuttle no straps 10# weight 2x8 reps each    Step ups on 4-inch with opposite lat pulldown cables 10# 3x8 reps each   Modified standing middle trap holds 2x10-12 reps 3 sec holds     Not Today:  Paloff press in 1/2 lunge on cables 10# 2x10 reps each   Hip hikes on 2-inch 1x10 reps 10 sec holds   Glute isometrics (sidelying clamshells) with GreenTB 3x45 sec with 2 min rest breaks between   Lateral band walks  with RedTB 3 laps each direction (~15 feet)  Knee extension machine DL 3x8 reps 3 sec up and 3 sec down 15#   (L) Sidelying hip ER 2x10 reps 3 sec holds  Step ups 4-inch with RedTB control ER 3x8 reps     therapeutic activities to improve functional performance for 06 minutes, including:     [place] carrys 12# KB with march 3 laps each direction     Not Today:  Aerobic exercise NuStep 8' or 1000K steps for endogenous release of opioids  DL shuttle press 3x10 reps 82.5# emphasis on slow and controlled to help getting up and down   SL shuttle press 3x6-8 reps L 25#, R 50#    gait training to improve functional mobility and safety for 00  minutes, including:    Patient Education and Home Exercises       Education provided:   - PT POC, Prognosis, and HEP     Written Home Exercises Provided: Patient instructed to cont prior HEP. Exercises were reviewed and Delphine was able to demonstrate them prior to the end of the session.  Delphine demonstrated good  understanding of the education provided. See EMR under Patient Instructions for exercises provided during therapy sessions    Assessment     Delphine has been seen for a total of 9 visits plus initial evaluation for a medical diagnosis of M47.816 (ICD-10-CM) - Lumbar spondylosis and M43.10 (ICD-10-CM) - Spondylolisthesis, acquired. She has shown significant improvement in her range of motion, strength, and functional mobility with improved FOTO score since beginning physical therapy. She continues with limitations in strength most notably in left hip and hamstrings limiting full return to prior level of function. She continues with some pain on elliptical and treadmill but able to tolerate increased time than previously. Discussed with patient's progress dropping frequency and continuing with HEP to which patient agreed. She continues to be challenged with further strengthening and core stability exercises with good tolerance. Will continue to monitor and progress as able.      Delphine Is progressing well towards her goals.   Pt prognosis is Good.     Pt will continue to benefit from skilled outpatient physical therapy to address the deficits listed in the problem list box on initial evaluation, provide pt/family education and to maximize pt's level of independence in the home and community environment.     Pt's spiritual, cultural and educational needs considered and pt agreeable to plan of care and goals.     Anticipated Barriers for therapy: Scheduling, chronicity of symptoms, age     Goals: Short Term Goals: 4 weeks (Progressing, Not Met)  Patient will be independent in initial HEP to help supplement PT. - MET  Patient will improve lumbar range of motion to >/= 70% in all planes to help with ADLs. - MET  Patient will improve hip flexion to >/= 110 degrees to help with gait mechanics. - MET on R, Not Met on L      Long Term Goals: 8 weeks (Progressing, Not Met)  Patient will be independent in updated HEP to help supplement PT and maintain gains made in PT. - MET  Patient will improve FOTO limitation score to </= 37% to show improvement in condition. - MET  Patient will improve hip strength by 1/3 MMT to help with ADLS.   Patient will be able to return to gym activities pain free.     Plan     Plan of care Certification: 6/5/2023 to 8/20/2023. Updated to 10/15/2023     Continue with current plan of care with emphasis on hip mobility, hip/glute strengthening, gait mechanics, and return to recreational activities.     Outpatient Physical Therapy 1 time every other week to every 2 weeks  for 8 weeks to include the following interventions: Gait Training, Manual Therapy, Moist Heat/ Ice, Neuromuscular Re-ed, Patient Education, Self Care, Therapeutic Activities, and Therapeutic Exercise.     Nancy Sousa, PT, DPT, OCS

## 2023-10-13 DIAGNOSIS — G20.A1 PD (PARKINSON'S DISEASE): ICD-10-CM

## 2023-10-13 RX ORDER — CARBIDOPA AND LEVODOPA 25; 100 MG/1; MG/1
TABLET ORAL
Qty: 270 TABLET | Refills: 3 | Status: SHIPPED | OUTPATIENT
Start: 2023-10-13

## 2023-11-14 ENCOUNTER — PATIENT MESSAGE (OUTPATIENT)
Dept: NEUROLOGY | Facility: CLINIC | Age: 78
End: 2023-11-14
Payer: MEDICARE

## 2023-11-15 DIAGNOSIS — Z78.0 MENOPAUSE: ICD-10-CM

## 2023-12-05 ENCOUNTER — TELEPHONE (OUTPATIENT)
Dept: INTERNAL MEDICINE | Facility: CLINIC | Age: 78
End: 2023-12-05
Payer: MEDICARE

## 2023-12-05 DIAGNOSIS — I10 PRIMARY HYPERTENSION: ICD-10-CM

## 2023-12-05 DIAGNOSIS — R73.9 HYPERGLYCEMIA: ICD-10-CM

## 2023-12-05 DIAGNOSIS — Z00.00 ANNUAL PHYSICAL EXAM: Primary | ICD-10-CM

## 2023-12-05 NOTE — TELEPHONE ENCOUNTER
----- Message from Chaparrita Mireles sent at 12/5/2023 10:37 AM CST -----  Contact: FORTINO [phone]--  type: Lab--Annual labs--    Caller is requesting to schedule their Lab appointment prior to annual appointment.  Order is not listed in EPIC.  Please enter order and contact patient to schedule.    Name of Caller:--Annual list--    Preferred Date and Time of Labs:--before the appointment--    Date of Annual Physical Appointment:--01/30/24    Where would they like the lab performed?--[facility]--    Would the patient rather a call back or a response via [facility]?--Call back--    Best Call Back Number:-[phone]      Additional Information:Please add orders in and call to schedule.

## 2024-01-24 ENCOUNTER — LAB VISIT (OUTPATIENT)
Dept: LAB | Facility: HOSPITAL | Age: 79
End: 2024-01-24
Payer: MEDICARE

## 2024-01-24 DIAGNOSIS — R73.9 HYPERGLYCEMIA: ICD-10-CM

## 2024-01-24 DIAGNOSIS — Z00.00 ANNUAL PHYSICAL EXAM: ICD-10-CM

## 2024-01-24 DIAGNOSIS — I10 PRIMARY HYPERTENSION: ICD-10-CM

## 2024-01-24 LAB
ALBUMIN SERPL BCP-MCNC: 3.9 G/DL (ref 3.5–5.2)
ALP SERPL-CCNC: 66 U/L (ref 55–135)
ALT SERPL W/O P-5'-P-CCNC: 18 U/L (ref 10–44)
ANION GAP SERPL CALC-SCNC: 8 MMOL/L (ref 8–16)
AST SERPL-CCNC: 16 U/L (ref 10–40)
BASOPHILS # BLD AUTO: 0.07 K/UL (ref 0–0.2)
BASOPHILS NFR BLD: 1.3 % (ref 0–1.9)
BILIRUB SERPL-MCNC: 0.4 MG/DL (ref 0.1–1)
BUN SERPL-MCNC: 15 MG/DL (ref 8–23)
CALCIUM SERPL-MCNC: 9.4 MG/DL (ref 8.7–10.5)
CHLORIDE SERPL-SCNC: 103 MMOL/L (ref 95–110)
CHOLEST SERPL-MCNC: 164 MG/DL (ref 120–199)
CHOLEST/HDLC SERPL: 2.7 {RATIO} (ref 2–5)
CO2 SERPL-SCNC: 29 MMOL/L (ref 23–29)
CREAT SERPL-MCNC: 0.7 MG/DL (ref 0.5–1.4)
DIFFERENTIAL METHOD BLD: ABNORMAL
EOSINOPHIL # BLD AUTO: 0.1 K/UL (ref 0–0.5)
EOSINOPHIL NFR BLD: 1.5 % (ref 0–8)
ERYTHROCYTE [DISTWIDTH] IN BLOOD BY AUTOMATED COUNT: 12.5 % (ref 11.5–14.5)
EST. GFR  (NO RACE VARIABLE): >60 ML/MIN/1.73 M^2
ESTIMATED AVG GLUCOSE: 117 MG/DL (ref 68–131)
GLUCOSE SERPL-MCNC: 94 MG/DL (ref 70–110)
HBA1C MFR BLD: 5.7 % (ref 4–5.6)
HCT VFR BLD AUTO: 41.6 % (ref 37–48.5)
HDLC SERPL-MCNC: 61 MG/DL (ref 40–75)
HDLC SERPL: 37.2 % (ref 20–50)
HGB BLD-MCNC: 13.7 G/DL (ref 12–16)
IMM GRANULOCYTES # BLD AUTO: 0.01 K/UL (ref 0–0.04)
IMM GRANULOCYTES NFR BLD AUTO: 0.2 % (ref 0–0.5)
LDLC SERPL CALC-MCNC: 89.4 MG/DL (ref 63–159)
LYMPHOCYTES # BLD AUTO: 1.8 K/UL (ref 1–4.8)
LYMPHOCYTES NFR BLD: 33 % (ref 18–48)
MCH RBC QN AUTO: 31.9 PG (ref 27–31)
MCHC RBC AUTO-ENTMCNC: 32.9 G/DL (ref 32–36)
MCV RBC AUTO: 97 FL (ref 82–98)
MONOCYTES # BLD AUTO: 0.4 K/UL (ref 0.3–1)
MONOCYTES NFR BLD: 7.5 % (ref 4–15)
NEUTROPHILS # BLD AUTO: 3 K/UL (ref 1.8–7.7)
NEUTROPHILS NFR BLD: 56.5 % (ref 38–73)
NONHDLC SERPL-MCNC: 103 MG/DL
NRBC BLD-RTO: 0 /100 WBC
PLATELET # BLD AUTO: 273 K/UL (ref 150–450)
PMV BLD AUTO: 11 FL (ref 9.2–12.9)
POTASSIUM SERPL-SCNC: 4.2 MMOL/L (ref 3.5–5.1)
PROT SERPL-MCNC: 6.8 G/DL (ref 6–8.4)
RBC # BLD AUTO: 4.29 M/UL (ref 4–5.4)
SODIUM SERPL-SCNC: 140 MMOL/L (ref 136–145)
TRIGL SERPL-MCNC: 68 MG/DL (ref 30–150)
TSH SERPL DL<=0.005 MIU/L-ACNC: 3.04 UIU/ML (ref 0.4–4)
WBC # BLD AUTO: 5.3 K/UL (ref 3.9–12.7)

## 2024-01-24 PROCEDURE — 80061 LIPID PANEL: CPT | Mod: HCNC | Performed by: INTERNAL MEDICINE

## 2024-01-24 PROCEDURE — 83036 HEMOGLOBIN GLYCOSYLATED A1C: CPT | Mod: HCNC | Performed by: INTERNAL MEDICINE

## 2024-01-24 PROCEDURE — 80053 COMPREHEN METABOLIC PANEL: CPT | Mod: HCNC | Performed by: INTERNAL MEDICINE

## 2024-01-24 PROCEDURE — 36415 COLL VENOUS BLD VENIPUNCTURE: CPT | Mod: HCNC | Performed by: INTERNAL MEDICINE

## 2024-01-24 PROCEDURE — 84443 ASSAY THYROID STIM HORMONE: CPT | Mod: HCNC | Performed by: INTERNAL MEDICINE

## 2024-01-24 PROCEDURE — 85025 COMPLETE CBC W/AUTO DIFF WBC: CPT | Mod: HCNC | Performed by: INTERNAL MEDICINE

## 2024-01-26 ENCOUNTER — TELEPHONE (OUTPATIENT)
Dept: NEUROLOGY | Facility: CLINIC | Age: 79
End: 2024-01-26
Payer: MEDICARE

## 2024-01-30 ENCOUNTER — OFFICE VISIT (OUTPATIENT)
Dept: INTERNAL MEDICINE | Facility: CLINIC | Age: 79
End: 2024-01-30
Payer: MEDICARE

## 2024-01-30 VITALS
WEIGHT: 167.56 LBS | BODY MASS INDEX: 30.83 KG/M2 | DIASTOLIC BLOOD PRESSURE: 80 MMHG | HEART RATE: 72 BPM | SYSTOLIC BLOOD PRESSURE: 130 MMHG | HEIGHT: 62 IN | OXYGEN SATURATION: 100 %

## 2024-01-30 DIAGNOSIS — Z12.31 ENCOUNTER FOR SCREENING MAMMOGRAM FOR BREAST CANCER: ICD-10-CM

## 2024-01-30 DIAGNOSIS — Z00.00 ANNUAL PHYSICAL EXAM: Primary | ICD-10-CM

## 2024-01-30 DIAGNOSIS — M48.02 SPINAL STENOSIS IN CERVICAL REGION: ICD-10-CM

## 2024-01-30 DIAGNOSIS — I10 PRIMARY HYPERTENSION: ICD-10-CM

## 2024-01-30 DIAGNOSIS — M47.816 OSTEOARTHRITIS OF LUMBAR SPINE, UNSPECIFIED SPINAL OSTEOARTHRITIS COMPLICATION STATUS: ICD-10-CM

## 2024-01-30 DIAGNOSIS — G20.A2 PARKINSON'S DISEASE WITH FLUCTUATING MANIFESTATIONS, UNSPECIFIED WHETHER DYSKINESIA PRESENT: ICD-10-CM

## 2024-01-30 PROCEDURE — 3079F DIAST BP 80-89 MM HG: CPT | Mod: HCNC,CPTII,S$GLB, | Performed by: INTERNAL MEDICINE

## 2024-01-30 PROCEDURE — 99999 PR PBB SHADOW E&M-EST. PATIENT-LVL IV: CPT | Mod: PBBFAC,HCNC,, | Performed by: INTERNAL MEDICINE

## 2024-01-30 PROCEDURE — 3288F FALL RISK ASSESSMENT DOCD: CPT | Mod: HCNC,CPTII,S$GLB, | Performed by: INTERNAL MEDICINE

## 2024-01-30 PROCEDURE — 99397 PER PM REEVAL EST PAT 65+ YR: CPT | Mod: HCNC,S$GLB,, | Performed by: INTERNAL MEDICINE

## 2024-01-30 PROCEDURE — 1160F RVW MEDS BY RX/DR IN RCRD: CPT | Mod: HCNC,CPTII,S$GLB, | Performed by: INTERNAL MEDICINE

## 2024-01-30 PROCEDURE — 1101F PT FALLS ASSESS-DOCD LE1/YR: CPT | Mod: HCNC,CPTII,S$GLB, | Performed by: INTERNAL MEDICINE

## 2024-01-30 PROCEDURE — 1159F MED LIST DOCD IN RCRD: CPT | Mod: HCNC,CPTII,S$GLB, | Performed by: INTERNAL MEDICINE

## 2024-01-30 PROCEDURE — 1126F AMNT PAIN NOTED NONE PRSNT: CPT | Mod: HCNC,CPTII,S$GLB, | Performed by: INTERNAL MEDICINE

## 2024-01-30 PROCEDURE — 3075F SYST BP GE 130 - 139MM HG: CPT | Mod: HCNC,CPTII,S$GLB, | Performed by: INTERNAL MEDICINE

## 2024-01-30 NOTE — PROGRESS NOTES
"Subjective:       Patient ID: Delphine Perdomo is a 78 y.o. female.    Chief Complaint: Annual Exam  This is a 78-year-old who presents today for physical.  Patient reports that she has been doing well overall she continues have issues with tremors and her Parkinson's tremors maybe getting a little bit more prominent over time she does have a follow-up planned with neurology.  She has had no recent falls he has been more cautious with her movement.  She does have some issues with her back and hip and went to see the orthopedist she had bursitis in the left hip and also some issues with her back she did some imaging and they noted that she did have some disc bulging and some spinal stenosis she reports she seemed to get better with therapy but will make a follow-up with [facility] depending on how her symptoms do over time .  She and her  are planning on doing more traveling the spring she has some graduations with her grandchild in her niece that she plans to attend.    HPI  Review of Systems   Musculoskeletal:         Back right hip pain   Saw ortho did therapy improved   Neurological:         Tremors         Objective:    Blood pressure 130/80, pulse 72, height 5' 2" (1.575 m), weight 76 kg (167 lb 8.8 oz), SpO2 100 %.   Physical Exam  Constitutional:       General: She is not in acute distress.  HENT:      Head: Normocephalic.      Mouth/Throat:      Pharynx: Oropharynx is clear.   Eyes:      General: No scleral icterus.  Cardiovascular:      Rate and Rhythm: Normal rate and regular rhythm.      Heart sounds: Normal heart sounds. No murmur heard.     No friction rub. No gallop.      Comments: Breast : normal no masses or tenderness      Pulmonary:      Effort: Pulmonary effort is normal. No respiratory distress.      Breath sounds: Normal breath sounds.   Abdominal:      General: Bowel sounds are normal.      Palpations: Abdomen is soft. There is no mass.      Tenderness: There is no abdominal " tenderness.   Musculoskeletal:      Cervical back: Neck supple.   Skin:     Findings: No erythema.   Neurological:      Mental Status: She is alert.      Comments: Tremors    Psychiatric:         Mood and Affect: Mood normal.       Assessment:       1. Annual physical exam    2. Parkinson's disease with fluctuating manifestations, unspecified whether dyskinesia present    3. Primary hypertension    4. Encounter for screening mammogram for breast cancer    5. Osteoarthritis of lumbar spine, unspecified spinal osteoarthritis complication status    6. Spinal stenosis in cervical region        Plan:       Delphine Simms was seen today for annual exam.    Diagnoses and all orders for this visit:    Annual physical exam    Parkinson's disease with fluctuating manifestations, unspecified whether dyskinesia present  She continues to follow with neurology remains on Sinemet and has a follow-up appointment planned    Primary hypertension  Blood pressure well controlled she remains on amlodipine    Encounter for screening mammogram for breast cancer  -     Mammo Digital Screening Bilat w/ [facility]; Future    Osteoarthritis of lumbar spine, unspecified spinal osteoarthritis complication status  With bursitis recent episode of she completed some physical therapy with some improvement she continues to follow with the [facility]    Spinal stenosis in cervical region  She would previous cervical spine surgery    Discussed annual mammogram we discussed shingles vaccine  She did get COVID and RSV vaccines in her annual flu shot    Follow-up annually sooner if concern

## 2024-02-07 ENCOUNTER — PATIENT MESSAGE (OUTPATIENT)
Dept: RESEARCH | Facility: HOSPITAL | Age: 79
End: 2024-02-07
Payer: MEDICARE

## 2024-02-23 ENCOUNTER — HOSPITAL ENCOUNTER (OUTPATIENT)
Dept: RADIOLOGY | Facility: HOSPITAL | Age: 79
Discharge: HOME | End: 2024-02-23
Attending: INTERNAL MEDICINE
Payer: MEDICARE

## 2024-02-23 DIAGNOSIS — Z12.31 ENCOUNTER FOR SCREENING MAMMOGRAM FOR BREAST CANCER: ICD-10-CM

## 2024-02-23 PROCEDURE — 77063 BREAST TOMOSYNTHESIS BI: CPT | Mod: 26,HCNC,, | Performed by: RADIOLOGY

## 2024-02-23 PROCEDURE — 77067 SCR MAMMO BI INCL CAD: CPT | Mod: TC,HCNC

## 2024-02-23 PROCEDURE — 77067 SCR MAMMO BI INCL CAD: CPT | Mod: 26,HCNC,, | Performed by: RADIOLOGY

## 2024-02-27 ENCOUNTER — OFFICE VISIT (OUTPATIENT)
Dept: NEUROLOGY | Facility: CLINIC | Age: 79
End: 2024-02-27
Payer: MEDICARE

## 2024-02-27 VITALS
SYSTOLIC BLOOD PRESSURE: 126 MMHG | WEIGHT: 158.63 LBS | DIASTOLIC BLOOD PRESSURE: 66 MMHG | HEART RATE: 66 BPM | BODY MASS INDEX: 29.01 KG/M2

## 2024-02-27 DIAGNOSIS — G20.A2 PARKINSON'S DISEASE WITHOUT DYSKINESIA, WITH FLUCTUATING MANIFESTATIONS: Primary | ICD-10-CM

## 2024-02-27 PROCEDURE — 1126F AMNT PAIN NOTED NONE PRSNT: CPT | Mod: HCNC,CPTII,S$GLB, | Performed by: PSYCHIATRY & NEUROLOGY

## 2024-02-27 PROCEDURE — 99999 PR PBB SHADOW E&M-EST. PATIENT-LVL III: CPT | Mod: PBBFAC,HCNC,, | Performed by: PSYCHIATRY & NEUROLOGY

## 2024-02-27 PROCEDURE — 1159F MED LIST DOCD IN RCRD: CPT | Mod: HCNC,CPTII,S$GLB, | Performed by: PSYCHIATRY & NEUROLOGY

## 2024-02-27 PROCEDURE — 99214 OFFICE O/P EST MOD 30 MIN: CPT | Mod: HCNC,S$GLB,, | Performed by: PSYCHIATRY & NEUROLOGY

## 2024-02-27 PROCEDURE — 3074F SYST BP LT 130 MM HG: CPT | Mod: HCNC,CPTII,S$GLB, | Performed by: PSYCHIATRY & NEUROLOGY

## 2024-02-27 PROCEDURE — 3078F DIAST BP <80 MM HG: CPT | Mod: HCNC,CPTII,S$GLB, | Performed by: PSYCHIATRY & NEUROLOGY

## 2024-02-27 RX ORDER — RASAGILINE 1 MG/1
1 TABLET ORAL DAILY
Qty: 30 TABLET | Refills: 5 | Status: SHIPPED | OUTPATIENT
Start: 2024-02-27 | End: 2024-03-04

## 2024-02-27 NOTE — PROGRESS NOTES
Name: Delphine Perdomo  MRN: [id number]   CSN: [id number]      Date: 02/27/2024    Referring physician:  No referring provider defined for this encounter.    Chief Complaint: tremor     Interval History:  - would like more peak benefit from meds  - a little off time  -  staying active  - PT/OT is helpful  - diet and nutrition are stable       From Sept 2023:  - recent trip to [facility]   - accompanied by spouse   - went to the ED, hurt her hip   - lumbar issues   - doing PT   - going to the gym, exercising   - back pain and hip much better now   - saw Dr. Figueroa   - taking 1 tab TID   - no falls   -  notices tremor more than she does   - she feels she is stable   - some cramping in her toes in the evenings, has to get up and walk   - takes a multivitamin daily, contains iron         From Feb 2023  - trial of lodosyn with each dose last visit   - accompanied by spouse   - no falls   - started eating a little something with cd/ld and nausea subsided   - did not fill lodosyn   - exercising less, had an incident where she got leg stuck in pant leg and pulled muscle   - improving and planning on exercising more   - last dose was at noon       From Aug 2022  - has had hair loss since starting ldopa, just thinning, not clumps  - is better on the ldopa in general, tremor better and no falls  - is more conscious of her left foot while walking  - had successful trips to [place] to see grandson play baseball, also a trip to [place]  - enjoys walking around [place] for exercise  - some nausea from taking the ldopa on an empty stomach  - stretches and works on posture       From Nov 2021  - ldopa trial last visit   - accompanied by    - has noticed some improvement in tremors   - also has noticed improvement in her balance and able to go down stairs more easily   -  has noticed improvement in tremor   - when she first started taking it, caused nausea  - feels better whenever she eats protein with  her medicine   - no falls   -  has noticed improvement in shuffling   - she has noticed that she doesn't swing her L arm as much   - still going to the gym   - denies hallucinations        From Sept 2021: Delphine Perdomo is a R HANDED 78 y.o. female with a medical issues significant for cervical DDD with myelopathy s/p surgery in 2011 who presents for tremor. Accompanied by . Tremor started a year ago. Noticed it first in the left hand. About two years ago started feeling some gait instability. Didn't feel as stable on her bike as she had prior. Notices the tremor mostly at rest. Some L leg tremor as well. No tremor on the R side. Sometimes L hand tremor wakes her up. 3-4 falls in the past month, didn't step up high enough on the stairs. Fell trying to get out of bed. Denies feeling lightheaded or dizzy with falls.  has noticed some shuffling of her feet. Having trouble putting on necklaces.    No improvement of tremor with EtOH.  has noticed some masked facies. States that she looks more serious.   Goes to the gym and gets on the treadmill and bike.   Sleeps 8-9 hours a night, takes a nap during the day.     Family History: grandmother had tremors      Neuroleptic Exposure: none     Nonmotor/Premotor ROS:  Anosmia: poor sense of smell -- attributes to sinus issues   Dysarthria/Hypophonia: normal   Dysphagia/Sialorrhea: none   Depression:  has noticed she is more tired lately   Cognitive slowing: good,  agrees   Hallucinations: none   Urinary changes: frequency, nocturia   Constipation: one BM every couple of days   Orthostasis: none   Falls: as above   Freezing: none   Micrographia: not that she has noticed   Sleep issues:  -MESSI: none   -RBD: none       Review of Systems:   Review of Systems   Constitutional:  Negative for chills, fever and malaise/fatigue.   HENT:  Negative for hearing loss.    Eyes:  Negative for blurred vision and double vision.   Respiratory:   Negative for cough, shortness of breath and stridor.    Cardiovascular:  Negative for chest pain and leg swelling.   Gastrointestinal:  Negative for constipation, diarrhea and nausea.   Genitourinary:  Positive for frequency. Negative for urgency.   Musculoskeletal:  Positive for falls.   Skin:  Negative for itching and rash.   Neurological:  Positive for tremors. Negative for dizziness, loss of consciousness and weakness.   Psychiatric/Behavioral:  Negative for hallucinations and memory loss.            Past Medical History: The patient  has a past medical history of Degenerative disc disease, Dislocation, Glaucoma, Lichen sclerosus, and Urge incontinence.    Social History: The patient  reports that she has never smoked. She has never used smokeless tobacco. She reports current alcohol use. She reports that she does not use drugs.    Family History: Their family history includes Cancer in her father; Diabetes in her brother; Hypertension in her mother and sister; Hypoglycemic in her mother.    Allergies: Codeine, Morphine, and Sulfa (sulfonamide antibiotics)     Meds:   Current Outpatient Medications on File Prior to Visit   Medication Sig Dispense Refill    acetaminophen (TYLENOL) 500 MG tablet Take 2 tablets (1,000 mg total) by mouth 2 (two) times daily as needed for Pain. 40 tablet 0    amLODIPine (NORVASC) 5 MG tablet Take 1 tablet (5 mg total) by mouth once daily. 90 tablet 4    ascorbic acid, vitamin C, (VITAMIN C) 100 MG tablet Take 100 mg by mouth once daily.      carbidopa-levodopa  mg (SINEMET)  mg per tablet TAKE 1 TABLET BY MOUTH THREE TIMES A  tablet 3    latanoprost 0.005 % ophthalmic solution Place one drop in right eye every night      multivitamin capsule Take 1 capsule by mouth once daily.      LIDOcaine (LIDODERM) 5 % Place 1 patch onto the skin once daily. Remove & Discard patch within 12 hours or as directed by MD 15 patch 2     No current facility-administered medications  on file prior to visit.       Exam:  /66 (BP Location: Left arm, Patient Position: Sitting, BP Method: Medium (Automatic))   Pulse 66   Wt 72 kg (158 lb 9.9 oz)   BMI 29.01 kg/m²     Constitutional  Well-developed, well-nourished, appears stated age   Ophthalmoscopic  No papilledema with no hemorrhages or exudates bilaterally   Cardiovascular  Radial pulses 2+ and symmetric, no LE edema bilaterally   Neurological    * Mental status  MOCA =      - Orientation  Oriented to person, place, time, and situation     - Memory   Intact recent and remote     - Attention/concentration  Attentive, vigilant during exam     - Language  Naming & repetition intact, +2-step commands     - Fund of knowledge  Aware of current events     - Executive  Well-organized thoughts     - Other     * Cranial nerves       - CN II  PERRL, visual fields full to confrontation     - CN III, IV, VI  Extraocular movements full, normal pursuits and saccades     - CN V  Sensation V1 - V3 intact     - CN VII  Face strong and symmetric bilaterally     - CN VIII  Hearing intact bilaterally     - CN IX, X  Palate raises midline and symmetric     - CN XI  SCM and trapezius 5/5 bilaterally     - CN XII  Tongue midline   * Motor  Muscle bulk normal, strength 5/5 throughout   * Sensory   Intact to light touch    * Coordination  No dysmetria with finger-to-nose or heel-to-shin   * Gait  See below.   * Deep tendon reflexes  3+ and symmetric throughout   Babinski downgoing bilaterally   * Specialized movement exam  No hypophonic speech, hyperphonic, pleasant and animated    mild facial masking.   L cogwheel rigidity   L bradykinesia.   No other dystonia, chorea, athetosis, myoclonus, or tics.   No motor impersistence.   Normal-based gait.   No shortened stride length.    No postural instability.      resting tremor of L hand -- pill-rolling    L re-emergent tremor   L leg tremor with contralateral activation    decreased L arm swing     retropulsion on  exam today      Laboratory/Radiological:  - Results:  Lab Visit on 01/24/2024   Component Date Value Ref Range Status    WBC 01/24/2024 5.30  3.90 - 12.70 K/uL Final    RBC 01/24/2024 4.29  4.00 - 5.40 M/uL Final    Hemoglobin 01/24/2024 13.7  12.0 - 16.0 g/dL Final    Hematocrit 01/24/2024 41.6  37.0 - 48.5 % Final    MCV 01/24/2024 97  82 - 98 fL Final    MCH 01/24/2024 31.9 (H)  27.0 - 31.0 pg Final    MCHC 01/24/2024 32.9  32.0 - 36.0 g/dL Final    RDW 01/24/2024 12.5  11.5 - 14.5 % Final    Platelets 01/24/2024 273  150 - 450 K/uL Final    MPV 01/24/2024 11.0  9.2 - 12.9 fL Final    Immature Granulocytes 01/24/2024 0.2  0.0 - 0.5 % Final    Gran # (ANC) 01/24/2024 3.0  1.8 - 7.7 K/uL Final    Immature Grans (Abs) 01/24/2024 0.01  0.00 - 0.04 K/uL Final    Lymph # 01/24/2024 1.8  1.0 - 4.8 K/uL Final    Mono # 01/24/2024 0.4  0.3 - 1.0 K/uL Final    Eos # 01/24/2024 0.1  0.0 - 0.5 K/uL Final    Baso # 01/24/2024 0.07  0.00 - 0.20 K/uL Final    nRBC 01/24/2024 0  0 /100 WBC Final    Gran % 01/24/2024 56.5  38.0 - 73.0 % Final    Lymph % 01/24/2024 33.0  18.0 - 48.0 % Final    Mono % 01/24/2024 7.5  4.0 - 15.0 % Final    Eosinophil % 01/24/2024 1.5  0.0 - 8.0 % Final    Basophil % 01/24/2024 1.3  0.0 - 1.9 % Final    Differential Method 01/24/2024 Automated   Final    Sodium 01/24/2024 140  136 - 145 mmol/L Final    Potassium 01/24/2024 4.2  3.5 - 5.1 mmol/L Final    Chloride 01/24/2024 103  95 - 110 mmol/L Final    CO2 01/24/2024 29  23 - 29 mmol/L Final    Glucose 01/24/2024 94  70 - 110 mg/dL Final    BUN 01/24/2024 15  8 - 23 mg/dL Final    Creatinine 01/24/2024 0.7  0.5 - 1.4 mg/dL Final    Calcium 01/24/2024 9.4  8.7 - 10.5 mg/dL Final    Total Protein 01/24/2024 6.8  6.0 - 8.4 g/dL Final    Albumin 01/24/2024 3.9  3.5 - 5.2 g/dL Final    Total Bilirubin 01/24/2024 0.4  0.1 - 1.0 mg/dL Final    Alkaline Phosphatase 01/24/2024 66  55 - 135 U/L Final    AST 01/24/2024 16  10 - 40 U/L Final    ALT 01/24/2024 18   10 - 44 U/L Final    eGFR 01/24/2024 >60.0  >60 mL/min/1.73 m^2 Final    Anion Gap 01/24/2024 8  8 - 16 mmol/L Final    Hemoglobin A1C 01/24/2024 5.7 (H)  4.0 - 5.6 % Final    Estimated Avg Glucose 01/24/2024 117  68 - 131 mg/dL Final    Cholesterol 01/24/2024 164  120 - 199 mg/dL Final    Triglycerides 01/24/2024 68  30 - 150 mg/dL Final    HDL 01/24/2024 61  40 - 75 mg/dL Final    LDL Cholesterol 01/24/2024 89.4  63.0 - 159.0 mg/dL Final    HDL/Cholesterol Ratio 01/24/2024 37.2  20.0 - 50.0 % Final    Total Cholesterol/HDL Ratio 01/24/2024 2.7  2.0 - 5.0 Final    Non-HDL Cholesterol 01/24/2024 103  mg/dL Final    TSH 01/24/2024 3.043  0.400 - 4.000 uIU/mL Final       - Independent review of images:       - Independent review of consultant's notes: Henry          Assessment//Plan:   Parkinson's Disease  L-sided, likely typical PD   L pill rolling tremor, L bradykinesia, L cogwheel rigidity   Decreased L arm swing  Continue cd/ld 1 tab TID  Consider addition of rasagiline next visit     2. RLS  - takes multivitamin that contains iron   - discussed trial of low dose gabapentin, hold off       Adding rasagiline, [facility] if unaff[facility] via insurance.

## 2024-03-01 RX ORDER — AMLODIPINE BESYLATE 5 MG/1
5 TABLET ORAL
Qty: 90 TABLET | Refills: 3 | Status: SHIPPED | OUTPATIENT
Start: 2024-03-01

## 2024-03-01 NOTE — TELEPHONE ENCOUNTER
No care due was identified.  [facility] Embedded Care Due Messages. Reference number: [id number].   3/01/2024 4:29:46 PM CST

## 2024-03-02 NOTE — TELEPHONE ENCOUNTER
Refill Decision Note   Delphine Perdomo  is requesting a refill authorization.  Brief Assessment and Rationale for Refill:  Approve     Medication Therapy Plan:         Comments:     Note composed:6:46 PM 03/01/2024

## 2024-03-03 DIAGNOSIS — G20.A2 PARKINSON'S DISEASE WITHOUT DYSKINESIA, WITH FLUCTUATING MANIFESTATIONS: ICD-10-CM

## 2024-03-04 DIAGNOSIS — G20.A2 PARKINSON'S DISEASE WITHOUT DYSKINESIA, WITH FLUCTUATING MANIFESTATIONS: ICD-10-CM

## 2024-03-04 RX ORDER — RASAGILINE MESYLATE 1 MG/1
1 TABLET ORAL
Qty: 30 TABLET | Refills: 5 | Status: SHIPPED | OUTPATIENT
Start: 2024-03-04 | End: 2024-03-05

## 2024-03-05 ENCOUNTER — PATIENT MESSAGE (OUTPATIENT)
Dept: NEUROLOGY | Facility: CLINIC | Age: 79
End: 2024-03-05
Payer: MEDICARE

## 2024-03-05 DIAGNOSIS — G20.A2 PARKINSON'S DISEASE WITHOUT DYSKINESIA, WITH FLUCTUATING MANIFESTATIONS: Primary | ICD-10-CM

## 2024-03-05 RX ORDER — SELEGILINE HYDROCHLORIDE 5 MG/1
5 CAPSULE ORAL 2 TIMES DAILY
Qty: 180 CAPSULE | Refills: 3 | Status: SHIPPED | OUTPATIENT
Start: 2024-03-05

## 2024-03-05 NOTE — TELEPHONE ENCOUNTER
----- Message from Norma Burton sent at 3/5/2024  9:50 AM CST -----  Regarding: Pt called abt new rx sent to [facility]  Name of Who is Calling: Mr. Perdomo        What is the request in detail:  called states he was adv by  that his wife shouldn't not take the new Rx that was sent to [facility]. States needs to speak with someone to see if the  really prescribed the new Rx. Please advise         Can the clinic reply by [facility]:No        What Number to [facility] Back [facility]: [phone]

## 2024-03-06 NOTE — TELEPHONE ENCOUNTER
Pt spouse Would like to move forward with Rasagiline PA. Selegiline is not recommended by Dr. Gordillo due to adverse effects.       Advised PA can take a week or so to process.

## 2024-03-12 RX ORDER — RASAGILINE 1 MG/1
1 TABLET ORAL NIGHTLY
Qty: 30 TABLET | Refills: 11 | Status: SHIPPED | OUTPATIENT
Start: 2024-03-12 | End: 2025-03-12

## 2024-03-20 ENCOUNTER — PATIENT MESSAGE (OUTPATIENT)
Dept: NEUROLOGY | Facility: CLINIC | Age: 79
End: 2024-03-20
Payer: MEDICARE

## 2024-04-17 ENCOUNTER — HOSPITAL ENCOUNTER (OUTPATIENT)
Dept: RADIOLOGY | Facility: CLINIC | Age: 79
Discharge: HOME | End: 2024-04-17
Attending: INTERNAL MEDICINE
Payer: MEDICARE

## 2024-04-17 DIAGNOSIS — Z78.0 MENOPAUSE: ICD-10-CM

## 2024-04-17 PROCEDURE — 77080 DXA BONE DENSITY AXIAL: CPT | Mod: TC,HCNC

## 2024-04-17 PROCEDURE — 77080 DXA BONE DENSITY AXIAL: CPT | Mod: 26,HCNC,, | Performed by: INTERNAL MEDICINE

## 2024-04-24 ENCOUNTER — TELEPHONE (OUTPATIENT)
Dept: INTERNAL MEDICINE | Facility: CLINIC | Age: 79
End: 2024-04-24
Payer: MEDICARE

## 2024-05-22 ENCOUNTER — PATIENT MESSAGE (OUTPATIENT)
Dept: NEUROLOGY | Facility: CLINIC | Age: 79
End: 2024-05-22
Payer: MEDICARE

## 2024-06-10 ENCOUNTER — PATIENT MESSAGE (OUTPATIENT)
Dept: INTERNAL MEDICINE | Facility: CLINIC | Age: 79
End: 2024-06-10
Payer: MEDICARE

## 2024-09-30 ENCOUNTER — PATIENT MESSAGE (OUTPATIENT)
Dept: NEUROLOGY | Facility: CLINIC | Age: 79
End: 2024-09-30
Payer: MEDICARE

## 2024-10-06 DIAGNOSIS — G20.A1 PD (PARKINSON'S DISEASE): ICD-10-CM

## 2024-10-08 RX ORDER — CARBIDOPA AND LEVODOPA 25; 100 MG/1; MG/1
TABLET ORAL
Qty: 270 TABLET | Refills: 3 | Status: SHIPPED | OUTPATIENT
Start: 2024-10-08

## 2025-01-13 ENCOUNTER — OFFICE VISIT (OUTPATIENT)
Facility: CLINIC | Age: 80
End: 2025-01-13
Payer: MEDICARE

## 2025-01-13 VITALS
HEIGHT: 62 IN | DIASTOLIC BLOOD PRESSURE: 71 MMHG | WEIGHT: 158.75 LBS | SYSTOLIC BLOOD PRESSURE: 109 MMHG | HEART RATE: 79 BPM | BODY MASS INDEX: 29.21 KG/M2

## 2025-01-13 DIAGNOSIS — G20.A2 PARKINSON'S DISEASE WITHOUT DYSKINESIA, WITH FLUCTUATING MANIFESTATIONS: ICD-10-CM

## 2025-01-13 PROCEDURE — 99215 OFFICE O/P EST HI 40 MIN: CPT | Mod: HCNC,S$GLB,, | Performed by: PSYCHIATRY & NEUROLOGY

## 2025-01-13 PROCEDURE — 3078F DIAST BP <80 MM HG: CPT | Mod: HCNC,CPTII,S$GLB, | Performed by: PSYCHIATRY & NEUROLOGY

## 2025-01-13 PROCEDURE — 1126F AMNT PAIN NOTED NONE PRSNT: CPT | Mod: HCNC,CPTII,S$GLB, | Performed by: PSYCHIATRY & NEUROLOGY

## 2025-01-13 PROCEDURE — 99999 PR PBB SHADOW E&M-EST. PATIENT-LVL III: CPT | Mod: PBBFAC,HCNC,, | Performed by: PSYCHIATRY & NEUROLOGY

## 2025-01-13 PROCEDURE — 1159F MED LIST DOCD IN RCRD: CPT | Mod: HCNC,CPTII,S$GLB, | Performed by: PSYCHIATRY & NEUROLOGY

## 2025-01-13 PROCEDURE — G2211 COMPLEX E/M VISIT ADD ON: HCPCS | Mod: HCNC,S$GLB,, | Performed by: PSYCHIATRY & NEUROLOGY

## 2025-01-13 PROCEDURE — 3074F SYST BP LT 130 MM HG: CPT | Mod: HCNC,CPTII,S$GLB, | Performed by: PSYCHIATRY & NEUROLOGY

## 2025-01-13 NOTE — PROGRESS NOTES
Name: Delphine Perdomo  MRN: [id number]   CSN: [id number]      Date: 01/13/2025    Referring physician:  No referring provider defined for this encounter.    Chief Complaint: tremor     Interval History:  - added rasagiline last visit       MOTOR SYMPTOMS:  She demonstrates reduced left arm swing during ambulation. She experiences dystonia in hands and feet while driving, manifesting as muscle cramping and freezing up in fingers. She also reports cramping in arches of feet and toes requiring massage for relief. She has not monitored relationship between symptoms and carbidopa-levodopa timing. Family members have noticed a tremor, though she does not find it physically bothersome. She reports changes in handwriting, particularly when using right hand, requiring conscious effort and occasional pauses while writing. She denies micrographic writing.    SLEEP:  She goes to bed at 3601-9307 and initially falls asleep easily. She awakens around 0037-0667 and tosses and turns until approximately 0700, at which point she falls into a deep sleep until awakening between 6494-0999.    EXERCISE:  She exercises 5 times per week, with routine including elliptical, rowing, biking, and weightlifting.    MEDICATIONS:  She takes carbidopa/levodopa 3 times daily (morning, noon, and evening). She takes Rasagiline and eye drops at bedtime around 5510-5360.         Feb 2024  - would like more peak benefit from meds  - a little off time  -  staying active  - PT/OT is helpful  - diet and nutrition are stable       From Sept 2023:  - recent trip to [facility]   - accompanied by spouse   - went to the ED, hurt her hip   - lumbar issues   - doing PT   - going to the gym, exercising   - back pain and hip much better now   - saw Dr. Figueroa   - taking 1 tab TID   - no falls   -  notices tremor more than she does   - she feels she is stable   - some cramping in her toes in the evenings, has to get up and walk   - takes a multivitamin daily,  contains iron         From Feb 2023  - trial of lodosyn with each dose last visit   - accompanied by spouse   - no falls   - started eating a little something with cd/ld and nausea subsided   - did not fill lodosyn   - exercising less, had an incident where she got leg stuck in pant leg and pulled muscle   - improving and planning on exercising more   - last dose was at noon       From Aug 2022  - has had hair loss since starting ldopa, just thinning, not clumps  - is better on the ldopa in general, tremor better and no falls  - is more conscious of her left foot while walking  - had successful trips to [place] to see grandson play baseball, also a trip to [place]  - enjoys walking around [place] for exercise  - some nausea from taking the ldopa on an empty stomach  - stretches and works on posture       From Nov 2021  - ldopa trial last visit   - accompanied by    - has noticed some improvement in tremors   - also has noticed improvement in her balance and able to go down stairs more easily   -  has noticed improvement in tremor   - when she first started taking it, caused nausea  - feels better whenever she eats protein with her medicine   - no falls   -  has noticed improvement in shuffling   - she has noticed that she doesn't swing her L arm as much   - still going to the gym   - denies hallucinations        From Sept 2021: Delphine Perdomo is a R HANDED 79 y.o. female with a medical issues significant for cervical DDD with myelopathy s/p surgery in 2011 who presents for tremor. Accompanied by . Tremor started a year ago. Noticed it first in the left hand. About two years ago started feeling some gait instability. Didn't feel as stable on her bike as she had prior. Notices the tremor mostly at rest. Some L leg tremor as well. No tremor on the R side. Sometimes L hand tremor wakes her up. 3-4 falls in the past month, didn't step up high enough on the stairs. Fell trying to  get out of bed. Denies feeling lightheaded or dizzy with falls.  has noticed some shuffling of her feet. Having trouble putting on necklaces.    No improvement of tremor with EtOH.  has noticed some masked facies. States that she looks more serious.   Goes to the gym and gets on the treadmill and bike.   Sleeps 8-9 hours a night, takes a nap during the day.     Family History: grandmother had tremors      Neuroleptic Exposure: none     Nonmotor/Premotor ROS:  Anosmia: poor sense of smell -- attributes to sinus issues   Dysarthria/Hypophonia: normal   Dysphagia/Sialorrhea: none   Depression:  has noticed she is more tired lately   Cognitive slowing: good,  agrees   Hallucinations: none   Urinary changes: frequency, nocturia   Constipation: one BM every couple of days   Orthostasis: none   Falls: as above   Freezing: none   Micrographia: not that she has noticed   Sleep issues:  -MESSI: none   -RBD: none       Review of Systems:   Review of Systems   Constitutional:  Negative for chills, fever and malaise/fatigue.   HENT:  Negative for hearing loss.    Eyes:  Negative for blurred vision and double vision.   Respiratory:  Negative for cough, shortness of breath and stridor.    Cardiovascular:  Negative for chest pain and leg swelling.   Gastrointestinal:  Negative for constipation, diarrhea and nausea.   Genitourinary:  Positive for frequency. Negative for urgency.   Musculoskeletal:  Positive for falls.   Skin:  Negative for itching and rash.   Neurological:  Positive for tremors. Negative for dizziness, loss of consciousness and weakness.   Psychiatric/Behavioral:  Negative for hallucinations and memory loss.            Past Medical History: The patient  has a past medical history of Degenerative disc disease, Dislocation, Glaucoma, Lichen sclerosus, and Urge incontinence.    Social History: The patient  reports that she has never smoked. She has never used smokeless tobacco. She reports  current alcohol use. She reports that she does not use drugs.    Family History: Their family history includes Cancer in her father; Diabetes in her brother; Hypertension in her mother and sister; Hypoglycemic in her mother.    Allergies: Codeine, Morphine, and Sulfa (sulfonamide antibiotics)     Meds:   Current Outpatient Medications on File Prior to Visit   Medication Sig Dispense Refill    acetaminophen (TYLENOL) 500 MG tablet Take 2 tablets (1,000 mg total) by mouth 2 (two) times daily as needed for Pain. 40 tablet 0    amLODIPine (NORVASC) 5 MG tablet TAKE 1 TABLET BY MOUTH EVERY DAY 90 tablet 3    ascorbic acid, vitamin C, (VITAMIN C) 100 MG tablet Take 100 mg by mouth once daily.      carbidopa-levodopa  mg (SINEMET)  mg per tablet TAKE 1 TABLET BY MOUTH THREE TIMES A  tablet 3    latanoprost 0.005 % ophthalmic solution Place one drop in right eye every night      LIDOcaine (LIDODERM) 5 % Place 1 patch onto the skin once daily. Remove & Discard patch within 12 hours or as directed by MD 15 patch 2    multivitamin capsule Take 1 capsule by mouth once daily.      rasagiline (AZILECT) 1 mg Tab Take 1 tablet (1 mg total) by mouth every evening. 30 tablet 11    selegiline (ELDEPRYL) 5 mg Cap Take 1 capsule (5 mg total) by mouth 2 (two) times daily. 180 capsule 3     No current facility-administered medications on file prior to visit.       Exam:  There were no vitals taken for this visit.    Constitutional  Well-developed, well-nourished, appears stated age   Ophthalmoscopic  No papilledema with no hemorrhages or exudates bilaterally   Cardiovascular  Radial pulses 2+ and symmetric, no LE edema bilaterally   Neurological    * Mental status  MOCA =      - Orientation  Oriented to person, place, time, and situation     - Memory   Intact recent and remote     - Attention/concentration  Attentive, vigilant during exam     - Language  Naming & repetition intact, +2-step commands     - Fund of  knowledge  Aware of current events     - Executive  Well-organized thoughts     - Other     * Cranial nerves       - CN II  PERRL, visual fields full to confrontation     - CN III, IV, VI  Extraocular movements full, normal pursuits and saccades     - CN V  Sensation V1 - V3 intact     - CN VII  Face strong and symmetric bilaterally     - CN VIII  Hearing intact bilaterally     - CN IX, X  Palate raises midline and symmetric     - CN XI  SCM and trapezius 5/5 bilaterally     - CN XII  Tongue midline   * Motor  Muscle bulk normal, strength 5/5 throughout   * Sensory   Intact to light touch    * Coordination  No dysmetria with finger-to-nose or heel-to-shin   * Gait  See below.   * Deep tendon reflexes  3+ and symmetric throughout   Babinski downgoing bilaterally   * Specialized movement exam  No hypophonic speech, hyperphonic, pleasant and animated    mild facial masking.   L cogwheel rigidity   L bradykinesia.   No other dystonia, chorea, athetosis, myoclonus, or tics.   No motor impersistence.   Normal-based gait.   No shortened stride length.    No postural instability.      resting tremor of L hand -- pill-rolling    L re-emergent tremor   L leg tremor with contralateral activation    decreased L arm swing     retropulsion on exam today      Laboratory/Radiological:  - Results:  No visits with results within 3 Month(s) from this visit.   Latest known visit with results is:   Lab Visit on 01/24/2024   Component Date Value Ref Range Status    WBC 01/24/2024 5.30  3.90 - 12.70 K/uL Final    RBC 01/24/2024 4.29  4.00 - 5.40 M/uL Final    Hemoglobin 01/24/2024 13.7  12.0 - 16.0 g/dL Final    Hematocrit 01/24/2024 41.6  37.0 - 48.5 % Final    MCV 01/24/2024 97  82 - 98 fL Final    MCH 01/24/2024 31.9 (H)  27.0 - 31.0 pg Final    MCHC 01/24/2024 32.9  32.0 - 36.0 g/dL Final    RDW 01/24/2024 12.5  11.5 - 14.5 % Final    Platelets 01/24/2024 273  150 - 450 K/uL Final    MPV 01/24/2024 11.0  9.2 - 12.9 fL Final     Immature Granulocytes 01/24/2024 0.2  0.0 - 0.5 % Final    Gran # (ANC) 01/24/2024 3.0  1.8 - 7.7 K/uL Final    Immature Grans (Abs) 01/24/2024 0.01  0.00 - 0.04 K/uL Final    Lymph # 01/24/2024 1.8  1.0 - 4.8 K/uL Final    Mono # 01/24/2024 0.4  0.3 - 1.0 K/uL Final    Eos # 01/24/2024 0.1  0.0 - 0.5 K/uL Final    Baso # 01/24/2024 0.07  0.00 - 0.20 K/uL Final    nRBC 01/24/2024 0  0 /100 WBC Final    Gran % 01/24/2024 56.5  38.0 - 73.0 % Final    Lymph % 01/24/2024 33.0  18.0 - 48.0 % Final    Mono % 01/24/2024 7.5  4.0 - 15.0 % Final    Eosinophil % 01/24/2024 1.5  0.0 - 8.0 % Final    Basophil % 01/24/2024 1.3  0.0 - 1.9 % Final    Differential Method 01/24/2024 Automated   Final    Sodium 01/24/2024 140  136 - 145 mmol/L Final    Potassium 01/24/2024 4.2  3.5 - 5.1 mmol/L Final    Chloride 01/24/2024 103  95 - 110 mmol/L Final    CO2 01/24/2024 29  23 - 29 mmol/L Final    Glucose 01/24/2024 94  70 - 110 mg/dL Final    BUN 01/24/2024 15  8 - 23 mg/dL Final    Creatinine 01/24/2024 0.7  0.5 - 1.4 mg/dL Final    Calcium 01/24/2024 9.4  8.7 - 10.5 mg/dL Final    Total Protein 01/24/2024 6.8  6.0 - 8.4 g/dL Final    Albumin 01/24/2024 3.9  3.5 - 5.2 g/dL Final    Total Bilirubin 01/24/2024 0.4  0.1 - 1.0 mg/dL Final    Alkaline Phosphatase 01/24/2024 66  55 - 135 U/L Final    AST 01/24/2024 16  10 - 40 U/L Final    ALT 01/24/2024 18  10 - 44 U/L Final    eGFR 01/24/2024 >60.0  >60 mL/min/1.73 m^2 Final    Anion Gap 01/24/2024 8  8 - 16 mmol/L Final    Hemoglobin A1C 01/24/2024 5.7 (H)  4.0 - 5.6 % Final    Estimated Avg Glucose 01/24/2024 117  68 - 131 mg/dL Final    Cholesterol 01/24/2024 164  120 - 199 mg/dL Final    Triglycerides 01/24/2024 68  30 - 150 mg/dL Final    HDL 01/24/2024 61  40 - 75 mg/dL Final    LDL Cholesterol 01/24/2024 89.4  63.0 - 159.0 mg/dL Final    HDL/Cholesterol Ratio 01/24/2024 37.2  20.0 - 50.0 % Final    Total Cholesterol/HDL Ratio 01/24/2024 2.7  2.0 - 5.0 Final    Non-HDL Cholesterol  "01/24/2024 103  mg/dL Final    TSH 01/24/2024 3.043  0.400 - 4.000 uIU/mL Final       - Independent review of images:       - Independent review of consultant's notes: Henry        Assessment & Plan        PARKINSON's DISEASE, H & Y 2  - Assessed patient's response to Rasagiline, added previously to improve on-time and minimize off-time  - Evaluated ongoing left hand tremor, noting it remains present but does not significantly impact patient's function  - Considered adjusting carbidopa-levodopa dosing schedule to address potential wearing-off symptoms and nighttime stiffness  - Reviewed sleep patterns, noting disrupted sleep with early morning awakening- Explained dystonia as a Parkinsonian symptom.  - Discussed potential relationship between medication timing and symptom fluctuations.  - Educated on strategies for managing dystonia, including massage, heat, and stretching exercises.  - Patient to pay attention to potential relationship between dystonia symptoms and medication timing.  - Recommend trying wall stretches and "wall walking" to manage foot dystonia.  - Continued carbidopa-levodopa, currently taken 3 times daily.  - Advised to experiment with timing of doses:      - Option 1: Take first dose upon waking (as early as 6:00 AM if awake), then every 5-6 hours.     - Option 2: Consider adding a 4th daily dose if needed.  - Continued Rasagiline, taken daily.Option to switch timing from bedtime to morning if preferred for compliance.  - Send a note through the chart if any changes in medication timing or frequency are found to be more effective.    GAIT AND MOBILITY ABNORMALITIES:  - Discussed importance of maintaining regular exercise routine, including aerobic and strength training.  - Patient to continue regular exercise routine 5 times per week.  - Recommend incorporating more challenging stride length exercises during treadmill walking.  - Patient to attempt to increase arm swing, particularly on the " left side, during walking.    RLS  - Continue MVI with iron    FOLLOW-UP:  - Follow up in 3-4 months, with option to reschedule for 6-7 months out if needed.         This note was generated with the assistance of ambient listening technology. Verbal consent was obtained by the patient and accompanying visitor(s) for the recording of patient appointment to facilitate this note. I attest to having reviewed and edited the generated note for accuracy, though some syntax or spelling errors may persist. Please contact the author of this note for any clarification.

## 2025-01-27 DIAGNOSIS — R73.9 HYPERGLYCEMIA: ICD-10-CM

## 2025-01-27 DIAGNOSIS — I10 PRIMARY HYPERTENSION: ICD-10-CM

## 2025-01-27 DIAGNOSIS — G20.A2 PARKINSON'S DISEASE WITH FLUCTUATING MANIFESTATIONS, UNSPECIFIED WHETHER DYSKINESIA PRESENT: ICD-10-CM

## 2025-01-27 DIAGNOSIS — Z00.00 ANNUAL PHYSICAL EXAM: Primary | ICD-10-CM

## 2025-01-30 DIAGNOSIS — Z00.00 ENCOUNTER FOR MEDICARE ANNUAL WELLNESS EXAM: ICD-10-CM

## 2025-01-31 ENCOUNTER — LAB VISIT (OUTPATIENT)
Dept: LAB | Facility: HOSPITAL | Age: 80
End: 2025-01-31
Attending: INTERNAL MEDICINE
Payer: MEDICARE

## 2025-01-31 DIAGNOSIS — Z00.00 ANNUAL PHYSICAL EXAM: ICD-10-CM

## 2025-01-31 DIAGNOSIS — I10 PRIMARY HYPERTENSION: ICD-10-CM

## 2025-01-31 DIAGNOSIS — G20.A2 PARKINSON'S DISEASE WITH FLUCTUATING MANIFESTATIONS, UNSPECIFIED WHETHER DYSKINESIA PRESENT: ICD-10-CM

## 2025-01-31 DIAGNOSIS — R73.9 HYPERGLYCEMIA: ICD-10-CM

## 2025-01-31 LAB
ALBUMIN SERPL BCP-MCNC: 4.1 G/DL (ref 3.5–5.2)
ALP SERPL-CCNC: 70 U/L (ref 40–150)
ALT SERPL W/O P-5'-P-CCNC: <5 U/L (ref 10–44)
ANION GAP SERPL CALC-SCNC: 9 MMOL/L (ref 8–16)
AST SERPL-CCNC: 15 U/L (ref 10–40)
BASOPHILS # BLD AUTO: 0.08 K/UL (ref 0–0.2)
BASOPHILS NFR BLD: 1.5 % (ref 0–1.9)
BILIRUB SERPL-MCNC: 0.4 MG/DL (ref 0.1–1)
BUN SERPL-MCNC: 20 MG/DL (ref 8–23)
CALCIUM SERPL-MCNC: 9.6 MG/DL (ref 8.7–10.5)
CHLORIDE SERPL-SCNC: 106 MMOL/L (ref 95–110)
CHOLEST SERPL-MCNC: 174 MG/DL (ref 120–199)
CHOLEST/HDLC SERPL: 3 {RATIO} (ref 2–5)
CO2 SERPL-SCNC: 27 MMOL/L (ref 23–29)
CREAT SERPL-MCNC: 0.7 MG/DL (ref 0.5–1.4)
DIFFERENTIAL METHOD BLD: ABNORMAL
EOSINOPHIL # BLD AUTO: 0.1 K/UL (ref 0–0.5)
EOSINOPHIL NFR BLD: 2.2 % (ref 0–8)
ERYTHROCYTE [DISTWIDTH] IN BLOOD BY AUTOMATED COUNT: 12.2 % (ref 11.5–14.5)
EST. GFR  (NO RACE VARIABLE): >60 ML/MIN/1.73 M^2
ESTIMATED AVG GLUCOSE: 117 MG/DL (ref 68–131)
GLUCOSE SERPL-MCNC: 96 MG/DL (ref 70–110)
HBA1C MFR BLD: 5.7 % (ref 4–5.6)
HCT VFR BLD AUTO: 44.1 % (ref 37–48.5)
HDLC SERPL-MCNC: 58 MG/DL (ref 40–75)
HDLC SERPL: 33.3 % (ref 20–50)
HGB BLD-MCNC: 13.7 G/DL (ref 12–16)
IMM GRANULOCYTES # BLD AUTO: 0.01 K/UL (ref 0–0.04)
IMM GRANULOCYTES NFR BLD AUTO: 0.2 % (ref 0–0.5)
LDLC SERPL CALC-MCNC: 103 MG/DL (ref 63–159)
LYMPHOCYTES # BLD AUTO: 1.7 K/UL (ref 1–4.8)
LYMPHOCYTES NFR BLD: 31.9 % (ref 18–48)
MCH RBC QN AUTO: 30.7 PG (ref 27–31)
MCHC RBC AUTO-ENTMCNC: 31.1 G/DL (ref 32–36)
MCV RBC AUTO: 99 FL (ref 82–98)
MONOCYTES # BLD AUTO: 0.4 K/UL (ref 0.3–1)
MONOCYTES NFR BLD: 7.4 % (ref 4–15)
NEUTROPHILS # BLD AUTO: 3.1 K/UL (ref 1.8–7.7)
NEUTROPHILS NFR BLD: 56.8 % (ref 38–73)
NONHDLC SERPL-MCNC: 116 MG/DL
NRBC BLD-RTO: 0 /100 WBC
PLATELET # BLD AUTO: 267 K/UL (ref 150–450)
PMV BLD AUTO: 10.6 FL (ref 9.2–12.9)
POTASSIUM SERPL-SCNC: 4.3 MMOL/L (ref 3.5–5.1)
PROT SERPL-MCNC: 7.5 G/DL (ref 6–8.4)
RBC # BLD AUTO: 4.46 M/UL (ref 4–5.4)
SODIUM SERPL-SCNC: 142 MMOL/L (ref 136–145)
TRIGL SERPL-MCNC: 65 MG/DL (ref 30–150)
TSH SERPL DL<=0.005 MIU/L-ACNC: 1.73 UIU/ML (ref 0.4–4)
WBC # BLD AUTO: 5.4 K/UL (ref 3.9–12.7)

## 2025-01-31 PROCEDURE — 83036 HEMOGLOBIN GLYCOSYLATED A1C: CPT | Mod: HCNC | Performed by: INTERNAL MEDICINE

## 2025-01-31 PROCEDURE — 80053 COMPREHEN METABOLIC PANEL: CPT | Mod: HCNC | Performed by: INTERNAL MEDICINE

## 2025-01-31 PROCEDURE — 84443 ASSAY THYROID STIM HORMONE: CPT | Mod: HCNC | Performed by: INTERNAL MEDICINE

## 2025-01-31 PROCEDURE — 85025 COMPLETE CBC W/AUTO DIFF WBC: CPT | Mod: HCNC | Performed by: INTERNAL MEDICINE

## 2025-01-31 PROCEDURE — 80061 LIPID PANEL: CPT | Mod: HCNC | Performed by: INTERNAL MEDICINE

## 2025-01-31 PROCEDURE — 36415 COLL VENOUS BLD VENIPUNCTURE: CPT | Mod: HCNC | Performed by: INTERNAL MEDICINE

## 2025-02-03 ENCOUNTER — OFFICE VISIT (OUTPATIENT)
Dept: INTERNAL MEDICINE | Facility: CLINIC | Age: 80
End: 2025-02-03
Payer: MEDICARE

## 2025-02-03 ENCOUNTER — OFFICE VISIT (OUTPATIENT)
Dept: OTOLARYNGOLOGY | Facility: CLINIC | Age: 80
End: 2025-02-03
Payer: MEDICARE

## 2025-02-03 ENCOUNTER — CLINICAL SUPPORT (OUTPATIENT)
Dept: AUDIOLOGY | Facility: CLINIC | Age: 80
End: 2025-02-03
Payer: MEDICARE

## 2025-02-03 VITALS
WEIGHT: 160.69 LBS | SYSTOLIC BLOOD PRESSURE: 130 MMHG | OXYGEN SATURATION: 95 % | HEART RATE: 76 BPM | HEIGHT: 62 IN | DIASTOLIC BLOOD PRESSURE: 60 MMHG | BODY MASS INDEX: 29.57 KG/M2

## 2025-02-03 DIAGNOSIS — H90.3 ASYMMETRIC SNHL (SENSORINEURAL HEARING LOSS): Primary | ICD-10-CM

## 2025-02-03 DIAGNOSIS — H90.A22 SENSORINEURAL HEARING LOSS (SNHL) OF LEFT EAR WITH RESTRICTED HEARING OF RIGHT EAR: Primary | ICD-10-CM

## 2025-02-03 DIAGNOSIS — H93.12 TINNITUS, SUBJECTIVE, LEFT: ICD-10-CM

## 2025-02-03 DIAGNOSIS — H90.A22 SENSORINEURAL HEARING LOSS (SNHL) OF LEFT EAR WITH RESTRICTED HEARING OF RIGHT EAR: ICD-10-CM

## 2025-02-03 DIAGNOSIS — I10 PRIMARY HYPERTENSION: ICD-10-CM

## 2025-02-03 DIAGNOSIS — Z12.31 ENCOUNTER FOR SCREENING MAMMOGRAM FOR BREAST CANCER: ICD-10-CM

## 2025-02-03 DIAGNOSIS — Z00.00 ANNUAL PHYSICAL EXAM: Primary | ICD-10-CM

## 2025-02-03 DIAGNOSIS — H91.90 HEARING LOSS, UNSPECIFIED HEARING LOSS TYPE, UNSPECIFIED LATERALITY: ICD-10-CM

## 2025-02-03 DIAGNOSIS — G20.A2 PARKINSON'S DISEASE WITH FLUCTUATING MANIFESTATIONS, UNSPECIFIED WHETHER DYSKINESIA PRESENT: ICD-10-CM

## 2025-02-03 DIAGNOSIS — E74.39 GLUCOSE INTOLERANCE: ICD-10-CM

## 2025-02-03 PROCEDURE — 92557 COMPREHENSIVE HEARING TEST: CPT | Mod: S$GLB,,,

## 2025-02-03 PROCEDURE — 1101F PT FALLS ASSESS-DOCD LE1/YR: CPT | Mod: CPTII,S$GLB,, | Performed by: INTERNAL MEDICINE

## 2025-02-03 PROCEDURE — 1159F MED LIST DOCD IN RCRD: CPT | Mod: CPTII,S$GLB,, | Performed by: OTOLARYNGOLOGY

## 2025-02-03 PROCEDURE — 99999 PR PBB SHADOW E&M-EST. PATIENT-LVL II: CPT | Mod: PBBFAC,,,

## 2025-02-03 PROCEDURE — 3075F SYST BP GE 130 - 139MM HG: CPT | Mod: CPTII,S$GLB,, | Performed by: INTERNAL MEDICINE

## 2025-02-03 PROCEDURE — 92567 TYMPANOMETRY: CPT | Mod: S$GLB,,,

## 2025-02-03 PROCEDURE — 1101F PT FALLS ASSESS-DOCD LE1/YR: CPT | Mod: CPTII,S$GLB,, | Performed by: OTOLARYNGOLOGY

## 2025-02-03 PROCEDURE — 1126F AMNT PAIN NOTED NONE PRSNT: CPT | Mod: CPTII,S$GLB,, | Performed by: INTERNAL MEDICINE

## 2025-02-03 PROCEDURE — 3078F DIAST BP <80 MM HG: CPT | Mod: CPTII,S$GLB,, | Performed by: INTERNAL MEDICINE

## 2025-02-03 PROCEDURE — 99999 PR PBB SHADOW E&M-EST. PATIENT-LVL V: CPT | Mod: PBBFAC,HCNC,, | Performed by: INTERNAL MEDICINE

## 2025-02-03 PROCEDURE — 1126F AMNT PAIN NOTED NONE PRSNT: CPT | Mod: CPTII,S$GLB,, | Performed by: OTOLARYNGOLOGY

## 2025-02-03 PROCEDURE — 99204 OFFICE O/P NEW MOD 45 MIN: CPT | Mod: S$GLB,,, | Performed by: OTOLARYNGOLOGY

## 2025-02-03 PROCEDURE — 3288F FALL RISK ASSESSMENT DOCD: CPT | Mod: CPTII,S$GLB,, | Performed by: INTERNAL MEDICINE

## 2025-02-03 PROCEDURE — 1160F RVW MEDS BY RX/DR IN RCRD: CPT | Mod: CPTII,S$GLB,, | Performed by: INTERNAL MEDICINE

## 2025-02-03 PROCEDURE — 99999 PR PBB SHADOW E&M-EST. PATIENT-LVL II: CPT | Mod: PBBFAC,,, | Performed by: OTOLARYNGOLOGY

## 2025-02-03 PROCEDURE — 3288F FALL RISK ASSESSMENT DOCD: CPT | Mod: CPTII,S$GLB,, | Performed by: OTOLARYNGOLOGY

## 2025-02-03 PROCEDURE — 99397 PER PM REEVAL EST PAT 65+ YR: CPT | Mod: S$GLB,,, | Performed by: INTERNAL MEDICINE

## 2025-02-03 PROCEDURE — 1159F MED LIST DOCD IN RCRD: CPT | Mod: CPTII,S$GLB,, | Performed by: INTERNAL MEDICINE

## 2025-02-03 NOTE — PROGRESS NOTES
"Delphine Perdomo was seen today in the clinic for an audiologic evaluation.  Patient's main complaint was decreased hearing in the left ear. Mrs. Perdomo reported occasional difficulty hearing in the right ear, but that she notices significant difficulty hearing from the left. She reported being diagnosed with Parkinson's Disease. She endorsed tinnitus in the left ear only that is intermittent. Mrs. Perdomo reported history of working as a  and wearing a headset to take calls in a loud office. She also reported once or twice she has experienced a "spinning" sensation after rolling over in bed. Mrs. Perdomo denied otalgia, aural fullness, and other history of noise exposure.    Tympanometry revealed Type A in the right ear and Type A in the left ear.     Audiogram results revealed a mild to moderate sensorineural hearing loss (SNHL) from 0901-3730 Hz in the right ear and a moderate to severe SNHL in the left ear. There was a significant asymmetry noted between ears from 250-8000 Hz, with the left ear poorer than the right ear.    Speech reception thresholds were noted at 20 dBHL in the right ear and 40 dBHL in the left ear.    Speech discrimination scores were 100% in the right ear and 52% in the left ear. There was a speech discrimination asymmetry noted between ears, with the left ear poorer than the right ear.    Today's results were discussed with the patient and hearing aids were recommended bilaterally. Mrs. Perdomo is interested in a hearing aid consultation and would like to utilize any insurance benefits she has for hearing aids through [employer]. At this time, our clinic is not able to utilize insurance benefits outside the [facility] [employer] for hearing aid coverage. I recommended that Mrs. Perdomo contact her insurance provider to assess her benefits and request a list of providers who accept coverage for hearing aids through her plan. Mrs. Perdomo was also provided the clinic's number in the event that she " would like to pursue hearing aids at [facility]. Mrs. Perdomo expressed understanding of today's results and the plan.    Recommendations:  Otologic evaluation  Hearing aid consultation pending medical clearance  Annual audiogram or sooner if change perceived  Hearing protection in noise

## 2025-02-03 NOTE — PROGRESS NOTES
Ear, Nose, & Throat  Otolaryngology - Head & Neck Surgery    Summary of Visit:  Delphine Perdomo is a kind patient who was self-referred for Hearing Loss      Subjective:     Chief Complaint:   Chief Complaint   Patient presents with    Hearing Loss       Delphine Perdomo is a 79 y.o. female who was referred to me by Aaareferral Self in consultation for hearing loss.  Her  has noted a decline in the hearing in the left ear over the last 2-3 months.  The patient is unaware of this.  She denies any subjective hearing loss, tinnitus, imbalance, otalgia, or otorrhea.  She has no significant single sided noise exposure history.  No family history of otologic disease is noted.    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    Past Surgical History:   Procedure Laterality Date    APPENDECTOMY      BURSECTOMY OF ELBOW Right 3/14/2022    Procedure: BURSECTOMY, ELBOW,RIGHT;  Surgeon: Kathryn Jackson MD;  Location: [facility];  Service: Orthopedics;  Laterality: Right;  MAC/REGIONAL    CHOLECYSTECTOMY      EYE  SURGERY      cataract surgery     HYSTERECTOMY      supracervical/ retained cervix     OOPHORECTOMY Bilateral     SPINE SURGERY      cervical fusion/plate and discectomy        Family History  Her family history includes Cancer in her father; Diabetes in her brother; Hypertension in her mother and sister; Hypoglycemic in her mother.    Social History  She reports that she has never smoked. She has never used smokeless tobacco. She reports current alcohol use. She reports that she does not use drugs.    Allergies  She is allergic to codeine, morphine, and sulfa (sulfonamide antibiotics).    Medications  She has a current medication list which includes the following prescription(s): acetaminophen, amlodipine, ascorbic acid (vitamin c), carbidopa-levodopa  mg, latanoprost, lidocaine, multivitamin, rasagiline, and selegiline.    ROS:  Pertinent positive and negative review of systems as noted in HPI.     Objective:     There were no vitals taken for this visit.   General Appearance:   Awake, Alert and Oriented. NAD. Appropriate affect and appearance      Neuro:   Spontaneous eye opening, appropriate verbal responses, follows commands  Pupils equal, round & brisk. EOMI, no proptosis, no spontaneous nystagmus  Face is symmetric, HB I, non-edematous and SILT bilaterally  Vision grossly intact, Hearing grossly intact  BANDAR, normal tone     Head and Face:   skin is intact, facial movement symmetric     Ears:  Periauricular regions non-erythematous, non-fluctuanct non-tender  Pinna normal bilaterally, no skin lesions  EACs patent and without drainage bilaterally   Tympanic membranes are normal in appearance bilaterally.  No middle ear effusion noted bilaterally.    Nose:   External nose is symmetric, no skin lesions  Septum midline, No inferior turbinate hypertrophy, No polyps or rhinorrhea     OC/OP:  Tongue midline on extension, non-edematous, soft  No labial, buccal, oral tongue or floor of mouth lesions  Soft palate  symmetric, midline and without lesions or masses, tonsils symmetric  No masses or lesions of the visualized oropharynx     Neck:  Neck is symmetric, non-edematous, non-erythematous  Trachea is midline and easily palpable,  No palpable adenopathy or masses in levels I-VI     Glands:  Parotid and submandibular glands are symmetric and non-tender.   No thyroid nodules or masses, non-tender      Respiratory:  Normal work of breathing, no accessory muscle use, no stridor     Voice:  Normal vocal quality, volume, prosody and articulation   Data Review:     AUDIO        Procedures:       Assessment:     1. Asymmetric SNHL (sensorineural hearing loss)    2. Sensorineural hearing loss (SNHL) of left ear with restricted hearing of right ear        Plan:     I had a long discussion with the patient and her   regarding her condition and the further workup and management options.  Given the degree of asymmetry in both her pure tones as well as her discrimination, MRI of the IAC's is warranted to assess for retrocochlear pathology.  I advised her that the presence of a tumor is still not likely and that hearing aids may be of benefit for her.  However, we will obtain the MR prior to making this decision.    Orders Placed This Encounter   Procedures    MRI IAC/Temporal Bones W W/O Contrast          Problem List Items Addressed This Visit    None  Visit Diagnoses       Asymmetric SNHL (sensorineural hearing loss)    -  Primary    Sensorineural hearing loss (SNHL) of left ear with restricted hearing of right ear        Relevant Orders    MRI IAC/Temporal Bones W W/O Contrast

## 2025-02-03 NOTE — PROGRESS NOTES
"Subjective:       Patient ID: Delphine Perdomo is a 79 y.o. female.    Chief Complaint: Annual Exam  This is a 79-year-old who presents today for physical patient reports that she has been doing well continues to follow with her neurologist for Parkinson's taking sinemet as well as recent medication to help.  She seems to be doing well she does get some tremors sometimes more on the left but relatively stable with her medications she has had some concerns with hearing that has declined.  She and her  have been traveling a fair amount going to see family out of town which they enjoy and have it has been doing well she denies any increased swelling in her joints and back and neck have been relatively stable she tries to stay active walking or going to the gym related episode where she felt weak after she was on a trip and at a museum for long.  She thought maybe she had gone too long without eating or maybe she was due for her Sinemet symptoms resolved after sitting she denies chest pain or shortness for breath or any further symptoms sometimes she energy is a bit low and relates to her Parkinson's her blood pressure seems to be controlled she has tried to maintain her activity some difficulties at times when she is traveling.  She does have issues with her neck and back at times had previous spinal surgery but relatively stable    HPI  Review of Systems   Constitutional:  Positive for fatigue.   Respiratory:  Negative for shortness of breath.    Psychiatric/Behavioral:          Tremors       Objective:      Blood pressure 130/60, pulse 76, height 5' 2" (1.575 m), weight 72.9 kg (160 lb 11.5 oz), SpO2 95%.   Physical Exam  Constitutional:       General: She is not in acute distress.  HENT:      Head: Normocephalic.      Comments: Tm clear      Mouth/Throat:      Pharynx: Oropharynx is clear.   Eyes:      General: No scleral icterus.  Cardiovascular:      Rate and Rhythm: Normal rate and regular rhythm.      " "Heart sounds: Normal heart sounds. No murmur heard.     No friction rub. No gallop.      Comments: Breast : normal no masses or tenderness      Pulmonary:      Effort: Pulmonary effort is normal. No respiratory distress.      Breath sounds: Normal breath sounds.   Abdominal:      General: Bowel sounds are normal.      Palpations: Abdomen is soft. There is no mass.      Tenderness: There is no abdominal tenderness.   Musculoskeletal:      Cervical back: Neck supple.   Skin:     Findings: No erythema.   Neurological:      Mental Status: She is alert.      Comments: Mild tremor   Psychiatric:         Mood and Affect: Mood normal.         Assessment:       1. Annual physical exam    2. Encounter for screening mammogram for breast cancer    3. Hearing loss, unspecified hearing loss type, unspecified laterality    4. Parkinson's disease with fluctuating manifestations, unspecified whether dyskinesia present    5. Primary hypertension    6. Glucose intolerance        Plan:       Delphine Gilbert" was seen today for annual exam.    Diagnoses and all orders for this visit:    Annual physical exam    Encounter for screening mammogram for breast cancer  Mammogram order in for scheduling  -     Mammo Digital Screening Bilat w/ [facility]; Future    Hearing loss, unspecified hearing loss type, unspecified laterality  Referrals in for audiology ENT evaluation  -     Ambulatory referral/consult to Audiology; Future  -     Ambulatory referral/consult to ENT; Future    Parkinson's disease with fluctuating manifestations, unspecified whether dyskinesia present  History of following with Neurology had a recent appointment tremors stable    Glucose intolernace stable she has eliminated sugar from her diet     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

## 2025-02-09 ENCOUNTER — HOSPITAL ENCOUNTER (OUTPATIENT)
Dept: RADIOLOGY | Facility: HOSPITAL | Age: 80
Discharge: HOME | End: 2025-02-09
Attending: OTOLARYNGOLOGY
Payer: MEDICARE

## 2025-02-09 DIAGNOSIS — H90.A22 SENSORINEURAL HEARING LOSS (SNHL) OF LEFT EAR WITH RESTRICTED HEARING OF RIGHT EAR: ICD-10-CM

## 2025-02-09 PROCEDURE — 25500020 PHARM REV CODE 255: Mod: HCNC | Performed by: OTOLARYNGOLOGY

## 2025-02-09 PROCEDURE — A9585 GADOBUTROL INJECTION: HCPCS | Mod: HCNC | Performed by: OTOLARYNGOLOGY

## 2025-02-09 PROCEDURE — 70553 MRI BRAIN STEM W/O & W/DYE: CPT | Mod: 26,HCNC,, | Performed by: RADIOLOGY

## 2025-02-09 PROCEDURE — 70553 MRI BRAIN STEM W/O & W/DYE: CPT | Mod: TC,HCNC

## 2025-02-09 RX ORDER — GADOBUTROL 604.72 MG/ML
8 INJECTION INTRAVENOUS
Status: COMPLETED | OUTPATIENT
Start: 2025-02-09 | End: 2025-02-09

## 2025-02-09 RX ADMIN — GADOBUTROL 8 ML: 604.72 INJECTION INTRAVENOUS at 12:02

## 2025-02-24 ENCOUNTER — HOSPITAL ENCOUNTER (OUTPATIENT)
Dept: RADIOLOGY | Facility: HOSPITAL | Age: 80
Discharge: HOME | End: 2025-02-24
Attending: INTERNAL MEDICINE
Payer: MEDICARE

## 2025-02-24 ENCOUNTER — RESULTS FOLLOW-UP (OUTPATIENT)
Dept: INTERNAL MEDICINE | Facility: CLINIC | Age: 80
End: 2025-02-24

## 2025-02-24 DIAGNOSIS — Z12.31 ENCOUNTER FOR SCREENING MAMMOGRAM FOR BREAST CANCER: ICD-10-CM

## 2025-02-24 PROCEDURE — 77067 SCR MAMMO BI INCL CAD: CPT | Mod: 26,HCNC,, | Performed by: RADIOLOGY

## 2025-02-24 PROCEDURE — 77063 BREAST TOMOSYNTHESIS BI: CPT | Mod: 26,HCNC,, | Performed by: RADIOLOGY

## 2025-02-24 PROCEDURE — 77067 SCR MAMMO BI INCL CAD: CPT | Mod: TC,HCNC

## 2025-03-11 DIAGNOSIS — G20.A2 PARKINSON'S DISEASE WITHOUT DYSKINESIA, WITH FLUCTUATING MANIFESTATIONS: ICD-10-CM

## 2025-03-12 RX ORDER — RASAGILINE 1 MG/1
1 TABLET ORAL NIGHTLY
Qty: 90 TABLET | Refills: 3 | Status: SHIPPED | OUTPATIENT
Start: 2025-03-12

## 2025-03-13 RX ORDER — AMLODIPINE BESYLATE 5 MG/1
5 TABLET ORAL
Qty: 90 TABLET | Refills: 3 | Status: SHIPPED | OUTPATIENT
Start: 2025-03-13

## 2025-03-13 NOTE — TELEPHONE ENCOUNTER
No care due was identified.  [facility] Embedded Care Due Messages. Reference number: [id number].   3/13/2025 1:37:56 AM CDT

## 2025-03-13 NOTE — TELEPHONE ENCOUNTER
Refill Decision Note   Delphine Perdomo  is requesting a refill authorization.  Brief Assessment and Rationale for Refill:  Approve     Medication Therapy Plan:         Comments:     Note composed:7:58 AM 03/13/2025

## 2025-04-14 ENCOUNTER — TELEPHONE (OUTPATIENT)
Facility: CLINIC | Age: 80
End: 2025-04-14
Payer: MEDICARE

## 2025-04-14 NOTE — TELEPHONE ENCOUNTER
----- Message from Ramila sent at 4/14/2025 10:45 AM CDT -----  Pt spouse calling to make 6 month dary for July , pt will out of town for the month of July would like the first week of August , please call number below Confirmed patient's contact info below:Contact Name: Delphine Simms LeannePhone Number:  [phone]

## 2025-05-12 ENCOUNTER — PATIENT MESSAGE (OUTPATIENT)
Facility: CLINIC | Age: 80
End: 2025-05-12
Payer: MEDICARE

## 2025-05-16 ENCOUNTER — PATIENT MESSAGE (OUTPATIENT)
Facility: CLINIC | Age: 80
End: 2025-05-16
Payer: MEDICARE

## 2025-08-11 ENCOUNTER — OFFICE VISIT (OUTPATIENT)
Facility: CLINIC | Age: 80
End: 2025-08-11
Payer: MEDICARE

## 2025-08-11 VITALS
BODY MASS INDEX: 30.04 KG/M2 | HEART RATE: 80 BPM | HEIGHT: 62 IN | RESPIRATION RATE: 18 BRPM | DIASTOLIC BLOOD PRESSURE: 68 MMHG | SYSTOLIC BLOOD PRESSURE: 103 MMHG | WEIGHT: 163.25 LBS

## 2025-08-11 DIAGNOSIS — R40.0 DAYTIME SLEEPINESS: ICD-10-CM

## 2025-08-11 DIAGNOSIS — G20.A2 PARKINSON'S DISEASE WITHOUT DYSKINESIA, WITH FLUCTUATING MANIFESTATIONS: Primary | ICD-10-CM

## 2025-08-11 DIAGNOSIS — G25.81 RESTLESS LEG SYNDROME: ICD-10-CM

## 2025-08-11 DIAGNOSIS — Z71.89 COUNSELING REGARDING GOALS OF CARE: ICD-10-CM

## 2025-08-11 PROCEDURE — 99999 PR PBB SHADOW E&M-EST. PATIENT-LVL III: CPT | Mod: PBBFAC,HCNC,, | Performed by: PHYSICIAN ASSISTANT

## 2025-08-11 PROCEDURE — 3074F SYST BP LT 130 MM HG: CPT | Mod: CPTII,HCNC,S$GLB, | Performed by: PHYSICIAN ASSISTANT

## 2025-08-11 PROCEDURE — G2211 COMPLEX E/M VISIT ADD ON: HCPCS | Mod: HCNC,S$GLB,, | Performed by: PHYSICIAN ASSISTANT

## 2025-08-11 PROCEDURE — 1101F PT FALLS ASSESS-DOCD LE1/YR: CPT | Mod: CPTII,HCNC,S$GLB, | Performed by: PHYSICIAN ASSISTANT

## 2025-08-11 PROCEDURE — 3078F DIAST BP <80 MM HG: CPT | Mod: CPTII,HCNC,S$GLB, | Performed by: PHYSICIAN ASSISTANT

## 2025-08-11 PROCEDURE — 1159F MED LIST DOCD IN RCRD: CPT | Mod: CPTII,HCNC,S$GLB, | Performed by: PHYSICIAN ASSISTANT

## 2025-08-11 PROCEDURE — 1160F RVW MEDS BY RX/DR IN RCRD: CPT | Mod: CPTII,HCNC,S$GLB, | Performed by: PHYSICIAN ASSISTANT

## 2025-08-11 PROCEDURE — 3288F FALL RISK ASSESSMENT DOCD: CPT | Mod: CPTII,HCNC,S$GLB, | Performed by: PHYSICIAN ASSISTANT

## 2025-08-11 PROCEDURE — 1126F AMNT PAIN NOTED NONE PRSNT: CPT | Mod: CPTII,HCNC,S$GLB, | Performed by: PHYSICIAN ASSISTANT

## 2025-08-11 PROCEDURE — 99214 OFFICE O/P EST MOD 30 MIN: CPT | Mod: HCNC,S$GLB,, | Performed by: PHYSICIAN ASSISTANT

## (undated) DEVICE — HOSE DUAL W/CPC CONNECTORS

## (undated) DEVICE — Device

## (undated) DEVICE — CORD BIPOLAR 12 FOOT

## (undated) DEVICE — UNDERPAD ULTRASORB 300LB 30X36

## (undated) DEVICE — BANDAGE MATRIX HK LOOP 4IN 5YD

## (undated) DEVICE — TOURNIQUET SB QC DP 18X4IN

## (undated) DEVICE — SYR B-D DISP CONTROL 10CC100/C

## (undated) DEVICE — BUCKET PLASTER DISPOSABLE

## (undated) DEVICE — ELECTRODE REM PLYHSV RETURN 9

## (undated) DEVICE — GAUZE SPONGE 4X4 12PLY

## (undated) DEVICE — GLOVE BIOGEL PI MICRO INDIC 7

## (undated) DEVICE — SLING ARM LARGE FOAM STRAP

## (undated) DEVICE — DRESSING N ADH OIL EMUL 3X3

## (undated) DEVICE — ADHESIVE DERMABOND ADVANCED

## (undated) DEVICE — SEE L#120831

## (undated) DEVICE — STOCKINETTE DBL PLY ST 4X

## (undated) DEVICE — GLOVE BIOGEL ECLIPSE SZ 7

## (undated) DEVICE — NDL HYPO REG 25G X 1 1/2

## (undated) DEVICE — SUT 4.0 ETHILON

## (undated) DEVICE — SPLINT PLASTER FAST SET 5X30IN

## (undated) DEVICE — KIT COLLECTION E SWAB REGULAR

## (undated) DEVICE — SUT MCRYL PLUS 4-0 PS2 27IN

## (undated) DEVICE — SCRUB 10% POVIDONE IODINE 4OZ